# Patient Record
Sex: MALE | Race: BLACK OR AFRICAN AMERICAN | NOT HISPANIC OR LATINO | Employment: UNEMPLOYED | URBAN - NONMETROPOLITAN AREA
[De-identification: names, ages, dates, MRNs, and addresses within clinical notes are randomized per-mention and may not be internally consistent; named-entity substitution may affect disease eponyms.]

---

## 2020-08-01 ENCOUNTER — APPOINTMENT (OUTPATIENT)
Dept: NUCLEAR MEDICINE | Facility: HOSPITAL | Age: 45
End: 2020-08-01

## 2020-08-01 ENCOUNTER — APPOINTMENT (OUTPATIENT)
Dept: CARDIOLOGY | Facility: HOSPITAL | Age: 45
End: 2020-08-01

## 2020-08-01 ENCOUNTER — APPOINTMENT (OUTPATIENT)
Dept: GENERAL RADIOLOGY | Facility: HOSPITAL | Age: 45
End: 2020-08-01

## 2020-08-01 ENCOUNTER — HOSPITAL ENCOUNTER (INPATIENT)
Facility: HOSPITAL | Age: 45
LOS: 3 days | Discharge: HOME | End: 2020-08-04
Attending: INTERNAL MEDICINE | Admitting: INTERNAL MEDICINE

## 2020-08-01 ENCOUNTER — APPOINTMENT (OUTPATIENT)
Dept: ULTRASOUND IMAGING | Facility: HOSPITAL | Age: 45
End: 2020-08-01

## 2020-08-01 DIAGNOSIS — N18.30 CKD (CHRONIC KIDNEY DISEASE) STAGE 3, GFR 30-59 ML/MIN (HCC): Primary | ICD-10-CM

## 2020-08-01 PROBLEM — I21.4 NSTEMI (NON-ST ELEVATED MYOCARDIAL INFARCTION): Status: ACTIVE | Noted: 2020-08-01

## 2020-08-01 LAB
6-ACETYL MORPHINE: NEGATIVE
A-A DO2: 75.3 MMHG (ref 0–300)
ALBUMIN SERPL-MCNC: 3.4 G/DL (ref 3.5–5.2)
ALBUMIN/GLOB SERPL: 1.1 G/DL
ALP SERPL-CCNC: 72 U/L (ref 39–117)
ALT SERPL W P-5'-P-CCNC: 22 U/L (ref 1–41)
AMPHET+METHAMPHET UR QL: NEGATIVE
ANION GAP SERPL CALCULATED.3IONS-SCNC: 13.3 MMOL/L (ref 5–15)
APTT PPP: 31.6 SECONDS (ref 25.6–35.3)
ARTERIAL PATENCY WRIST A: POSITIVE
AST SERPL-CCNC: 21 U/L (ref 1–40)
ATMOSPHERIC PRESS: 723 MMHG
BARBITURATES UR QL SCN: NEGATIVE
BASE EXCESS BLDA CALC-SCNC: -0.3 MMOL/L (ref 0–2)
BASOPHILS # BLD AUTO: 0.02 10*3/MM3 (ref 0–0.2)
BASOPHILS NFR BLD AUTO: 0.3 % (ref 0–1.5)
BDY SITE: ABNORMAL
BENZODIAZ UR QL SCN: NEGATIVE
BILIRUB SERPL-MCNC: 0.2 MG/DL (ref 0–1.2)
BODY TEMPERATURE: 0 C
BUN SERPL-MCNC: 37 MG/DL (ref 6–20)
BUN/CREAT SERPL: 15.4 (ref 7–25)
BUPRENORPHINE SERPL-MCNC: NEGATIVE NG/ML
CA-I SERPL ISE-MCNC: 1.21 MMOL/L (ref 1.12–1.32)
CALCIUM SPEC-SCNC: 8.6 MG/DL (ref 8.6–10.5)
CANNABINOIDS SERPL QL: POSITIVE
CHLORIDE SERPL-SCNC: 103 MMOL/L (ref 98–107)
CHOLEST SERPL-MCNC: 153 MG/DL (ref 0–200)
CK MB SERPL-CCNC: 8.78 NG/ML
CK SERPL-CCNC: 315 U/L (ref 20–200)
CO2 BLDA-SCNC: 27 MMOL/L (ref 22–33)
CO2 SERPL-SCNC: 24.7 MMOL/L (ref 22–29)
COCAINE UR QL: NEGATIVE
COHGB MFR BLD: 1.7 % (ref 0–5)
CREAT SERPL-MCNC: 2.41 MG/DL (ref 0.76–1.27)
CRP SERPL-MCNC: 1.54 MG/DL (ref 0–0.5)
D DIMER PPP FEU-MCNC: 2.35 MCGFEU/ML (ref 0–0.5)
DEPRECATED RDW RBC AUTO: 46.5 FL (ref 37–54)
EOSINOPHIL # BLD AUTO: 0.16 10*3/MM3 (ref 0–0.4)
EOSINOPHIL NFR BLD AUTO: 2.8 % (ref 0.3–6.2)
ERYTHROCYTE [DISTWIDTH] IN BLOOD BY AUTOMATED COUNT: 15.1 % (ref 12.3–15.4)
GAS FLOW AIRWAY: 2 LPM
GFR SERPL CREATININE-BSD FRML MDRD: 35 ML/MIN/1.73
GLOBULIN UR ELPH-MCNC: 3.2 GM/DL
GLUCOSE SERPL-MCNC: 80 MG/DL (ref 65–99)
HBA1C MFR BLD: 5.6 % (ref 4.8–5.6)
HCO3 BLDA-SCNC: 25.6 MMOL/L (ref 20–26)
HCT VFR BLD AUTO: 41.3 % (ref 37.5–51)
HCT VFR BLD CALC: 39.7 % (ref 38–51)
HDLC SERPL-MCNC: 54 MG/DL (ref 40–60)
HGB BLD-MCNC: 12.7 G/DL (ref 13–17.7)
HGB BLDA-MCNC: 13 G/DL (ref 14–18)
IMM GRANULOCYTES # BLD AUTO: 0.01 10*3/MM3 (ref 0–0.05)
IMM GRANULOCYTES NFR BLD AUTO: 0.2 % (ref 0–0.5)
INHALED O2 CONCENTRATION: 28 %
INR PPP: 0.91 (ref 0.9–1.1)
LDLC SERPL CALC-MCNC: 86 MG/DL (ref 0–100)
LDLC/HDLC SERPL: 1.59 {RATIO}
LYMPHOCYTES # BLD AUTO: 1.7 10*3/MM3 (ref 0.7–3.1)
LYMPHOCYTES NFR BLD AUTO: 29.5 % (ref 19.6–45.3)
Lab: ABNORMAL
MAGNESIUM SERPL-MCNC: 2 MG/DL (ref 1.6–2.6)
MCH RBC QN AUTO: 26 PG (ref 26.6–33)
MCHC RBC AUTO-ENTMCNC: 30.8 G/DL (ref 31.5–35.7)
MCV RBC AUTO: 84.6 FL (ref 79–97)
METHADONE UR QL SCN: NEGATIVE
METHGB BLD QL: 0.4 % (ref 0–3)
MODALITY: ABNORMAL
MONOCYTES # BLD AUTO: 0.68 10*3/MM3 (ref 0.1–0.9)
MONOCYTES NFR BLD AUTO: 11.8 % (ref 5–12)
NEUTROPHILS NFR BLD AUTO: 3.2 10*3/MM3 (ref 1.7–7)
NEUTROPHILS NFR BLD AUTO: 55.4 % (ref 42.7–76)
NOTE: ABNORMAL
NRBC BLD AUTO-RTO: 0 /100 WBC (ref 0–0.2)
NT-PROBNP SERPL-MCNC: 4549 PG/ML (ref 0–450)
OPIATES UR QL: POSITIVE
OXYCODONE UR QL SCN: NEGATIVE
OXYHGB MFR BLDV: 89.7 % (ref 94–99)
PCO2 BLDA: 45.5 MM HG (ref 35–45)
PCO2 TEMP ADJ BLD: ABNORMAL MM[HG]
PCP UR QL SCN: NEGATIVE
PH BLDA: 7.36 PH UNITS (ref 7.35–7.45)
PH, TEMP CORRECTED: ABNORMAL
PLATELET # BLD AUTO: 190 10*3/MM3 (ref 140–450)
PMV BLD AUTO: 10.3 FL (ref 6–12)
PO2 BLDA: 63.3 MM HG (ref 83–108)
PO2 TEMP ADJ BLD: ABNORMAL MM[HG]
POTASSIUM SERPL-SCNC: 4 MMOL/L (ref 3.5–5.2)
PROT SERPL-MCNC: 6.6 G/DL (ref 6–8.5)
PROTHROMBIN TIME: 12.1 SECONDS (ref 11.9–14.1)
RBC # BLD AUTO: 4.88 10*6/MM3 (ref 4.14–5.8)
SAO2 % BLDCOA: 91.6 % (ref 94–99)
SODIUM SERPL-SCNC: 141 MMOL/L (ref 136–145)
TRIGL SERPL-MCNC: 65 MG/DL (ref 0–150)
TROPONIN T SERPL-MCNC: 0.05 NG/ML (ref 0–0.03)
TROPONIN T SERPL-MCNC: 0.06 NG/ML (ref 0–0.03)
TSH SERPL DL<=0.05 MIU/L-ACNC: 1.67 UIU/ML (ref 0.27–4.2)
VENTILATOR MODE: ABNORMAL
VLDLC SERPL-MCNC: 13 MG/DL
WBC # BLD AUTO: 5.77 10*3/MM3 (ref 3.4–10.8)

## 2020-08-01 PROCEDURE — 0 TECHNETIUM ALBUMIN AGGREGATED: Performed by: INTERNAL MEDICINE

## 2020-08-01 PROCEDURE — 80307 DRUG TEST PRSMV CHEM ANLYZR: CPT | Performed by: INTERNAL MEDICINE

## 2020-08-01 PROCEDURE — 94660 CPAP INITIATION&MGMT: CPT

## 2020-08-01 PROCEDURE — 84484 ASSAY OF TROPONIN QUANT: CPT | Performed by: INTERNAL MEDICINE

## 2020-08-01 PROCEDURE — 85379 FIBRIN DEGRADATION QUANT: CPT | Performed by: INTERNAL MEDICINE

## 2020-08-01 PROCEDURE — 93970 EXTREMITY STUDY: CPT

## 2020-08-01 PROCEDURE — 83880 ASSAY OF NATRIURETIC PEPTIDE: CPT | Performed by: INTERNAL MEDICINE

## 2020-08-01 PROCEDURE — 94799 UNLISTED PULMONARY SVC/PX: CPT

## 2020-08-01 PROCEDURE — 71045 X-RAY EXAM CHEST 1 VIEW: CPT

## 2020-08-01 PROCEDURE — 82805 BLOOD GASES W/O2 SATURATION: CPT

## 2020-08-01 PROCEDURE — 83735 ASSAY OF MAGNESIUM: CPT | Performed by: INTERNAL MEDICINE

## 2020-08-01 PROCEDURE — 82330 ASSAY OF CALCIUM: CPT | Performed by: INTERNAL MEDICINE

## 2020-08-01 PROCEDURE — A9540 TC99M MAA: HCPCS | Performed by: INTERNAL MEDICINE

## 2020-08-01 PROCEDURE — 25010000002 HEPARIN (PORCINE) PER 1000 UNITS: Performed by: INTERNAL MEDICINE

## 2020-08-01 PROCEDURE — 83036 HEMOGLOBIN GLYCOSYLATED A1C: CPT | Performed by: INTERNAL MEDICINE

## 2020-08-01 PROCEDURE — 82550 ASSAY OF CK (CPK): CPT | Performed by: INTERNAL MEDICINE

## 2020-08-01 PROCEDURE — 93306 TTE W/DOPPLER COMPLETE: CPT | Performed by: INTERNAL MEDICINE

## 2020-08-01 PROCEDURE — 93306 TTE W/DOPPLER COMPLETE: CPT

## 2020-08-01 PROCEDURE — 36600 WITHDRAWAL OF ARTERIAL BLOOD: CPT

## 2020-08-01 PROCEDURE — 85730 THROMBOPLASTIN TIME PARTIAL: CPT | Performed by: INTERNAL MEDICINE

## 2020-08-01 PROCEDURE — 80053 COMPREHEN METABOLIC PANEL: CPT | Performed by: INTERNAL MEDICINE

## 2020-08-01 PROCEDURE — 80061 LIPID PANEL: CPT | Performed by: INTERNAL MEDICINE

## 2020-08-01 PROCEDURE — 85610 PROTHROMBIN TIME: CPT | Performed by: INTERNAL MEDICINE

## 2020-08-01 PROCEDURE — 82375 ASSAY CARBOXYHB QUANT: CPT

## 2020-08-01 PROCEDURE — 82553 CREATINE MB FRACTION: CPT | Performed by: INTERNAL MEDICINE

## 2020-08-01 PROCEDURE — 93005 ELECTROCARDIOGRAM TRACING: CPT | Performed by: INTERNAL MEDICINE

## 2020-08-01 PROCEDURE — 83050 HGB METHEMOGLOBIN QUAN: CPT

## 2020-08-01 PROCEDURE — 71045 X-RAY EXAM CHEST 1 VIEW: CPT | Performed by: RADIOLOGY

## 2020-08-01 PROCEDURE — 99223 1ST HOSP IP/OBS HIGH 75: CPT | Performed by: INTERNAL MEDICINE

## 2020-08-01 PROCEDURE — 93010 ELECTROCARDIOGRAM REPORT: CPT | Performed by: INTERNAL MEDICINE

## 2020-08-01 PROCEDURE — 85025 COMPLETE CBC W/AUTO DIFF WBC: CPT | Performed by: INTERNAL MEDICINE

## 2020-08-01 PROCEDURE — 94640 AIRWAY INHALATION TREATMENT: CPT

## 2020-08-01 PROCEDURE — 84443 ASSAY THYROID STIM HORMONE: CPT | Performed by: INTERNAL MEDICINE

## 2020-08-01 PROCEDURE — 78580 LUNG PERFUSION IMAGING: CPT

## 2020-08-01 PROCEDURE — 86140 C-REACTIVE PROTEIN: CPT | Performed by: INTERNAL MEDICINE

## 2020-08-01 RX ORDER — ISOSORBIDE MONONITRATE 120 MG/1
120 TABLET, EXTENDED RELEASE ORAL DAILY
COMMUNITY

## 2020-08-01 RX ORDER — ISOSORBIDE MONONITRATE 60 MG/1
120 TABLET, EXTENDED RELEASE ORAL DAILY
Status: CANCELLED | OUTPATIENT
Start: 2020-08-01

## 2020-08-01 RX ORDER — BISACODYL 5 MG/1
5 TABLET, DELAYED RELEASE ORAL DAILY PRN
Status: DISCONTINUED | OUTPATIENT
Start: 2020-08-01 | End: 2020-08-04 | Stop reason: HOSPADM

## 2020-08-01 RX ORDER — HEPARIN SODIUM 5000 [USP'U]/ML
5000 INJECTION, SOLUTION INTRAVENOUS; SUBCUTANEOUS AS NEEDED
Status: DISCONTINUED | OUTPATIENT
Start: 2020-08-01 | End: 2020-08-02

## 2020-08-01 RX ORDER — CLONIDINE HYDROCHLORIDE 0.2 MG/1
0.2 TABLET ORAL 3 TIMES DAILY
Status: CANCELLED | OUTPATIENT
Start: 2020-08-01

## 2020-08-01 RX ORDER — MONTELUKAST SODIUM 10 MG/1
10 TABLET ORAL NIGHTLY
Status: CANCELLED | OUTPATIENT
Start: 2020-08-01

## 2020-08-01 RX ORDER — ALBUTEROL SULFATE 2.5 MG/3ML
2.5 SOLUTION RESPIRATORY (INHALATION) EVERY 4 HOURS PRN
Status: ON HOLD | COMMUNITY
End: 2021-12-17

## 2020-08-01 RX ORDER — HYDRALAZINE HYDROCHLORIDE 50 MG/1
50 TABLET, FILM COATED ORAL 3 TIMES DAILY
Status: DISCONTINUED | OUTPATIENT
Start: 2020-08-01 | End: 2020-08-03

## 2020-08-01 RX ORDER — IPRATROPIUM BROMIDE AND ALBUTEROL SULFATE 2.5; .5 MG/3ML; MG/3ML
3 SOLUTION RESPIRATORY (INHALATION)
Status: DISCONTINUED | OUTPATIENT
Start: 2020-08-01 | End: 2020-08-01

## 2020-08-01 RX ORDER — ALPRAZOLAM 1 MG/1
1 TABLET ORAL 2 TIMES DAILY PRN
Status: CANCELLED | OUTPATIENT
Start: 2020-08-01

## 2020-08-01 RX ORDER — ALBUTEROL SULFATE 90 UG/1
2 AEROSOL, METERED RESPIRATORY (INHALATION) EVERY 4 HOURS PRN
Status: ON HOLD | COMMUNITY
End: 2021-12-17

## 2020-08-01 RX ORDER — NITROGLYCERIN 20 MG/100ML
5-200 INJECTION INTRAVENOUS
Status: DISCONTINUED | OUTPATIENT
Start: 2020-08-01 | End: 2020-08-03

## 2020-08-01 RX ORDER — CLONIDINE HYDROCHLORIDE 0.2 MG/1
0.2 TABLET ORAL EVERY 8 HOURS SCHEDULED
Status: CANCELLED | OUTPATIENT
Start: 2020-08-01

## 2020-08-01 RX ORDER — ALPRAZOLAM 1 MG/1
1 TABLET ORAL NIGHTLY
COMMUNITY

## 2020-08-01 RX ORDER — CLONIDINE HYDROCHLORIDE 0.2 MG/1
0.2 TABLET ORAL 3 TIMES DAILY
Status: DISCONTINUED | OUTPATIENT
Start: 2020-08-01 | End: 2020-08-04 | Stop reason: HOSPADM

## 2020-08-01 RX ORDER — ALUMINA, MAGNESIA, AND SIMETHICONE 2400; 2400; 240 MG/30ML; MG/30ML; MG/30ML
15 SUSPENSION ORAL EVERY 6 HOURS PRN
Status: DISCONTINUED | OUTPATIENT
Start: 2020-08-01 | End: 2020-08-04 | Stop reason: HOSPADM

## 2020-08-01 RX ORDER — SODIUM CHLORIDE 0.9 % (FLUSH) 0.9 %
10 SYRINGE (ML) INJECTION AS NEEDED
Status: DISCONTINUED | OUTPATIENT
Start: 2020-08-01 | End: 2020-08-04 | Stop reason: HOSPADM

## 2020-08-01 RX ORDER — LOSARTAN POTASSIUM 25 MG/1
25 TABLET ORAL DAILY
Status: CANCELLED | OUTPATIENT
Start: 2020-08-01

## 2020-08-01 RX ORDER — ASPIRIN 81 MG/1
81 TABLET ORAL DAILY
Status: CANCELLED | OUTPATIENT
Start: 2020-08-01

## 2020-08-01 RX ORDER — ALPRAZOLAM 1 MG/1
1 TABLET ORAL 2 TIMES DAILY PRN
Status: DISCONTINUED | OUTPATIENT
Start: 2020-08-01 | End: 2020-08-02

## 2020-08-01 RX ORDER — ONDANSETRON 4 MG/1
4 TABLET, FILM COATED ORAL EVERY 6 HOURS PRN
Status: DISCONTINUED | OUTPATIENT
Start: 2020-08-01 | End: 2020-08-04 | Stop reason: HOSPADM

## 2020-08-01 RX ORDER — TAMSULOSIN HYDROCHLORIDE 0.4 MG/1
0.4 CAPSULE ORAL NIGHTLY
Status: CANCELLED | OUTPATIENT
Start: 2020-08-01

## 2020-08-01 RX ORDER — BUPROPION HYDROCHLORIDE 100 MG/1
200 TABLET, EXTENDED RELEASE ORAL DAILY
Status: CANCELLED | OUTPATIENT
Start: 2020-08-01

## 2020-08-01 RX ORDER — ASPIRIN 81 MG/1
81 TABLET ORAL DAILY
Status: ON HOLD | COMMUNITY
End: 2021-12-17

## 2020-08-01 RX ORDER — ONDANSETRON 2 MG/ML
4 INJECTION INTRAMUSCULAR; INTRAVENOUS EVERY 6 HOURS PRN
Status: DISCONTINUED | OUTPATIENT
Start: 2020-08-01 | End: 2020-08-04 | Stop reason: HOSPADM

## 2020-08-01 RX ORDER — BISACODYL 10 MG
10 SUPPOSITORY, RECTAL RECTAL DAILY PRN
Status: DISCONTINUED | OUTPATIENT
Start: 2020-08-01 | End: 2020-08-04 | Stop reason: HOSPADM

## 2020-08-01 RX ORDER — SODIUM CHLORIDE 0.9 % (FLUSH) 0.9 %
10 SYRINGE (ML) INJECTION EVERY 12 HOURS SCHEDULED
Status: DISCONTINUED | OUTPATIENT
Start: 2020-08-01 | End: 2020-08-04 | Stop reason: HOSPADM

## 2020-08-01 RX ORDER — BUPROPION HYDROCHLORIDE 100 MG/1
200 TABLET, EXTENDED RELEASE ORAL DAILY
Status: ON HOLD | COMMUNITY
End: 2021-12-17

## 2020-08-01 RX ORDER — ASPIRIN 81 MG/1
81 TABLET ORAL DAILY
Status: DISCONTINUED | OUTPATIENT
Start: 2020-08-01 | End: 2020-08-04 | Stop reason: HOSPADM

## 2020-08-01 RX ORDER — ATORVASTATIN CALCIUM 40 MG/1
40 TABLET, FILM COATED ORAL NIGHTLY
Status: DISCONTINUED | OUTPATIENT
Start: 2020-08-01 | End: 2020-08-04 | Stop reason: HOSPADM

## 2020-08-01 RX ORDER — ATORVASTATIN CALCIUM 40 MG/1
40 TABLET, FILM COATED ORAL NIGHTLY
COMMUNITY

## 2020-08-01 RX ORDER — HEPARIN SODIUM 10000 [USP'U]/100ML
5.99 INJECTION, SOLUTION INTRAVENOUS
Status: DISCONTINUED | OUTPATIENT
Start: 2020-08-01 | End: 2020-08-02

## 2020-08-01 RX ORDER — ALBUTEROL SULFATE 2.5 MG/3ML
2.5 SOLUTION RESPIRATORY (INHALATION) EVERY 4 HOURS PRN
Status: CANCELLED | OUTPATIENT
Start: 2020-08-01

## 2020-08-01 RX ORDER — LOSARTAN POTASSIUM 25 MG/1
25 TABLET ORAL DAILY
COMMUNITY
End: 2020-08-04 | Stop reason: HOSPADM

## 2020-08-01 RX ORDER — HYDRALAZINE HYDROCHLORIDE 50 MG/1
50 TABLET, FILM COATED ORAL EVERY 8 HOURS SCHEDULED
Status: CANCELLED | OUTPATIENT
Start: 2020-08-01

## 2020-08-01 RX ORDER — LABETALOL 300 MG/1
300 TABLET, FILM COATED ORAL 3 TIMES DAILY
COMMUNITY

## 2020-08-01 RX ORDER — BUMETANIDE 1 MG/1
1 TABLET ORAL DAILY
Status: CANCELLED | OUTPATIENT
Start: 2020-08-01

## 2020-08-01 RX ORDER — IPRATROPIUM BROMIDE AND ALBUTEROL SULFATE 2.5; .5 MG/3ML; MG/3ML
3 SOLUTION RESPIRATORY (INHALATION)
Status: DISCONTINUED | OUTPATIENT
Start: 2020-08-01 | End: 2020-08-04 | Stop reason: HOSPADM

## 2020-08-01 RX ORDER — ATORVASTATIN CALCIUM 40 MG/1
40 TABLET, FILM COATED ORAL NIGHTLY
Status: CANCELLED | OUTPATIENT
Start: 2020-08-01

## 2020-08-01 RX ORDER — TAMSULOSIN HYDROCHLORIDE 0.4 MG/1
0.4 CAPSULE ORAL NIGHTLY
Status: DISCONTINUED | OUTPATIENT
Start: 2020-08-01 | End: 2020-08-04 | Stop reason: HOSPADM

## 2020-08-01 RX ORDER — HEPARIN SODIUM 5000 [USP'U]/ML
2500 INJECTION, SOLUTION INTRAVENOUS; SUBCUTANEOUS AS NEEDED
Status: DISCONTINUED | OUTPATIENT
Start: 2020-08-01 | End: 2020-08-02

## 2020-08-01 RX ORDER — MONTELUKAST SODIUM 10 MG/1
10 TABLET ORAL NIGHTLY
Status: DISCONTINUED | OUTPATIENT
Start: 2020-08-01 | End: 2020-08-04 | Stop reason: HOSPADM

## 2020-08-01 RX ORDER — ACETAMINOPHEN 325 MG/1
650 TABLET ORAL EVERY 4 HOURS PRN
Status: DISCONTINUED | OUTPATIENT
Start: 2020-08-01 | End: 2020-08-04 | Stop reason: HOSPADM

## 2020-08-01 RX ORDER — DOCUSATE SODIUM 100 MG/1
100 CAPSULE, LIQUID FILLED ORAL 2 TIMES DAILY PRN
Status: DISCONTINUED | OUTPATIENT
Start: 2020-08-01 | End: 2020-08-04 | Stop reason: HOSPADM

## 2020-08-01 RX ORDER — BUMETANIDE 1 MG/1
1 TABLET ORAL DAILY
Status: ON HOLD | COMMUNITY
End: 2020-08-04 | Stop reason: SDUPTHER

## 2020-08-01 RX ORDER — CLONIDINE HYDROCHLORIDE 0.2 MG/1
0.3 TABLET ORAL 3 TIMES DAILY
Status: ON HOLD | COMMUNITY
End: 2021-12-17

## 2020-08-01 RX ORDER — MONTELUKAST SODIUM 10 MG/1
10 TABLET ORAL NIGHTLY
COMMUNITY

## 2020-08-01 RX ORDER — HEPARIN SODIUM 5000 [USP'U]/ML
5000 INJECTION, SOLUTION INTRAVENOUS; SUBCUTANEOUS ONCE
Status: COMPLETED | OUTPATIENT
Start: 2020-08-01 | End: 2020-08-01

## 2020-08-01 RX ORDER — HYDRALAZINE HYDROCHLORIDE 50 MG/1
50 TABLET, FILM COATED ORAL 3 TIMES DAILY
Status: ON HOLD | COMMUNITY
End: 2020-08-04 | Stop reason: SDUPTHER

## 2020-08-01 RX ORDER — NITROGLYCERIN 20 MG/100ML
INJECTION INTRAVENOUS
Status: COMPLETED
Start: 2020-08-01 | End: 2020-08-01

## 2020-08-01 RX ORDER — ACETAMINOPHEN 160 MG/5ML
650 SOLUTION ORAL EVERY 4 HOURS PRN
Status: DISCONTINUED | OUTPATIENT
Start: 2020-08-01 | End: 2020-08-04 | Stop reason: HOSPADM

## 2020-08-01 RX ORDER — ACETAMINOPHEN 650 MG/1
650 SUPPOSITORY RECTAL EVERY 4 HOURS PRN
Status: DISCONTINUED | OUTPATIENT
Start: 2020-08-01 | End: 2020-08-04 | Stop reason: HOSPADM

## 2020-08-01 RX ORDER — TAMSULOSIN HYDROCHLORIDE 0.4 MG/1
1 CAPSULE ORAL DAILY
COMMUNITY

## 2020-08-01 RX ORDER — BUPROPION HYDROCHLORIDE 100 MG/1
200 TABLET, EXTENDED RELEASE ORAL DAILY
Status: DISCONTINUED | OUTPATIENT
Start: 2020-08-01 | End: 2020-08-04 | Stop reason: HOSPADM

## 2020-08-01 RX ORDER — ASPIRIN 81 MG/1
TABLET, CHEWABLE ORAL
Status: DISPENSED
Start: 2020-08-01 | End: 2020-08-02

## 2020-08-01 RX ORDER — HYDRALAZINE HYDROCHLORIDE 50 MG/1
50 TABLET, FILM COATED ORAL 3 TIMES DAILY
Status: CANCELLED | OUTPATIENT
Start: 2020-08-01

## 2020-08-01 RX ORDER — ISOSORBIDE MONONITRATE 60 MG/1
120 TABLET, EXTENDED RELEASE ORAL DAILY
Status: DISCONTINUED | OUTPATIENT
Start: 2020-08-01 | End: 2020-08-04 | Stop reason: HOSPADM

## 2020-08-01 RX ADMIN — IPRATROPIUM BROMIDE AND ALBUTEROL SULFATE 3 ML: .5; 3 SOLUTION RESPIRATORY (INHALATION) at 18:51

## 2020-08-01 RX ADMIN — ASPIRIN 81 MG: 81 TABLET, COATED ORAL at 16:16

## 2020-08-01 RX ADMIN — Medication 1 DOSE: at 17:19

## 2020-08-01 RX ADMIN — TAMSULOSIN HYDROCHLORIDE 0.4 MG: 0.4 CAPSULE ORAL at 21:13

## 2020-08-01 RX ADMIN — SODIUM CHLORIDE 5 MG/HR: 9 INJECTION, SOLUTION INTRAVENOUS at 16:14

## 2020-08-01 RX ADMIN — NITROGLYCERIN 50 MCG/KG/MIN: 20 INJECTION INTRAVENOUS at 14:54

## 2020-08-01 RX ADMIN — HYDRALAZINE HYDROCHLORIDE 50 MG: 50 TABLET, FILM COATED ORAL at 21:13

## 2020-08-01 RX ADMIN — HEPARIN SODIUM 5000 UNITS: 5000 INJECTION INTRAVENOUS; SUBCUTANEOUS at 18:28

## 2020-08-01 RX ADMIN — SODIUM CHLORIDE 10 MG/HR: 9 INJECTION, SOLUTION INTRAVENOUS at 19:25

## 2020-08-01 RX ADMIN — CLONIDINE HYDROCHLORIDE 0.2 MG: 0.2 TABLET ORAL at 21:13

## 2020-08-01 RX ADMIN — ISOSORBIDE MONONITRATE 120 MG: 60 TABLET ORAL at 21:53

## 2020-08-01 RX ADMIN — ATORVASTATIN CALCIUM 40 MG: 40 TABLET, FILM COATED ORAL at 21:13

## 2020-08-01 RX ADMIN — SODIUM CHLORIDE 15 MG/HR: 9 INJECTION, SOLUTION INTRAVENOUS at 18:29

## 2020-08-01 RX ADMIN — SODIUM CHLORIDE, PRESERVATIVE FREE 10 ML: 5 INJECTION INTRAVENOUS at 21:14

## 2020-08-01 RX ADMIN — LABETALOL HYDROCHLORIDE 300 MG: 100 TABLET, FILM COATED ORAL at 21:13

## 2020-08-01 RX ADMIN — BUPROPION HYDROCHLORIDE 200 MG: 100 TABLET, EXTENDED RELEASE ORAL at 21:54

## 2020-08-01 RX ADMIN — SODIUM CHLORIDE 10 MG/HR: 9 INJECTION, SOLUTION INTRAVENOUS at 21:54

## 2020-08-01 RX ADMIN — HEPARIN SODIUM 5.99 UNITS/KG/HR: 10000 INJECTION, SOLUTION INTRAVENOUS at 18:28

## 2020-08-01 RX ADMIN — MONTELUKAST SODIUM 10 MG: 10 TABLET, COATED ORAL at 21:13

## 2020-08-01 NOTE — PLAN OF CARE
Pt arrival to unit c/o dull chest pain and hx of angina with exertion. No acute distress noted at this time. Attempting to access port a cath. WCTM

## 2020-08-01 NOTE — H&P
[facility] HOSPITALIST HISTORY AND PHYSICAL    Patient Identification:  Name:  Sudarshan Keene  Age:  45 y.o.  Sex:  male  :  1975  MRN:  [id number]   Visit Number:  [id number]  Primary Care Physician:  Provider, No Known         Chief complaint: Chest pain    History of presenting illness:    Sudarshan Keene is a 45 y.o. male with PMH significant for nonobstructive coronary artery disease (patient had a heart cath in  but no stents were placed), CHF, COPD, uncontrolled hypertension, sleep apnea and arthritis presented to the outside hospital emergency department chest pain.  Patient was found to have systolic blood pressure greater than 200.  He was also complaining of chest pain and troponins were found to be elevated and patient was transferred to our facility for further evaluation management.    Patient states that he woke up this morning because of his panic attack.  Patient states that he has had panic attacks off and on.  Patient states that he started having chest pain thereafter.  Patient states that the pain was localized in right lower chest around midclavicular line in 5th-6th intercostal space.  Patient states the pain was like a muscle spasm that he thought would go away but pain persisted.  Patient said the pain lasted for about 25 minutes.  He states that he took 3 nitro glycerin but did not have any improvement in his chest pain.  Patient therefore presented to the emergency department.  Patient states that the pain was associated with nausea and vomiting.  Patient also states that he has had some dizziness and lightheadedness and shortness of breath.  Patient states that he has had similar chest pain in the past as well.  He states that he had similar chest pain when he underwent cardiac catheterization in .  Per report from ER physician at [place] where patient initially presented, patient has had multiple presentations to the ER with chest pain and was advised  admission but had left AGAINST MEDICAL ADVICE because he said that he is feeling better.  Patient denies history of smoking but states that he uses marijuana daily and last use was 12 hours ago.  He denies any alcohol use.       Review of systems:  Constitutional: Patient denies any fevers, chills, lethargy, weight loss or night sweats.  Vision and hearing: Patient denies any acute changes in vision and hearing.  Respiratory: Patient denies any cough, hemoptysis, dyspnea.  Cardiovascular: Patient is chest pain-free at this time but had chest pain earlier as per HPI.  GI: Patient denies any nausea, vomiting, hematemesis, melena and hematochezia  Genitourinary: Patient denies any dysuria, frequency, urgency or hematuria.  Neurologic: Patient denies any dizziness, lightheadedness, seizures, syncope.  Psychiatry: Patient denies any delusions, hallucinations, suicidal ideation.  Musculoskeletal: Patient denies any gait problems, joint swelling, joint pains or back pain.      Past Medical History:   Diagnosis Date   • Arthritis    • Asthma    • CHF (congestive heart failure) (CMS/[facility])    • COPD (chronic obstructive pulmonary disease) (CMS/[facility])    • Coronary artery disease    • Hypertension    • Sleep apnea      Past Surgical History:   Procedure Laterality Date   • CARDIAC CATHETERIZATION  2008   • HERNIA REPAIR       Family History   Problem Relation Age of Onset   • Hypertension Mother    • Hypertension Father    • Hypertension Sister    • Heart disease Sister      Social History     Socioeconomic History   • Marital status: Single     Spouse name: Not on file   • Number of children: Not on file   • Years of education: Not on file   • Highest education level: Not on file   Tobacco Use   • Smoking status: Former Smoker     Packs/day: 0.25     Types: Cigarettes     Last attempt to quit: 2014     Years since quittin.0   • Smokeless tobacco: Never Used   Substance and Sexual Activity   • Alcohol use: Never      Frequency: Never   • Drug use: Yes     Frequency: 7.0 times per week     Types: Marijuana     Comment: tobacco free   • Sexual activity: Yes     Partners: Female     ---------------------------------------------------------------------------------------------------------------------   Allergies:  Patient has no known allergies.  ---------------------------------------------------------------------------------------------------------------------   Prior to Admission Medications     None        Hospital Scheduled Meds:    aspirin 81 mg Oral Daily   sodium chloride 10 mL Intravenous Q12H       nitroglycerin 5-200 mcg/min   Pharmacy to Dose enoxaparin (LOVENOX)      ---------------------------------------------------------------------------------------------------------------------   Vital Signs:  Temp:  [98.6 °F (37 °C)] 98.6 °F (37 °C)  Heart Rate:  [84-86] 86  Resp:  [18] 18  BP: (182-218)/(116-134) 218/134      08/01/20  1434   Weight: (!) 167 kg (368 lb 8 oz)     Body mass index is 54.42 kg/m².  ---------------------------------------------------------------------------------------------------------------------   Physical Exam:  Constitutional: Morbidly obese male, lying in bed, no acute distress.   HENT:  Head:  Normocephalic and atraumatic.  Mouth:  Moist mucous membranes.    Eyes:  Pupils are equal, round, and reactive to light.   Neck:  Neck supple.  No JVD present.    Cardiovascular:  Regular rate and rhythm. S1+S2. No murmur, rubs or gallops.   Lungs/Chest: Clear to auscultation bilaterally.  Patient has a port in right upper chest.  Abdomen:  Soft. Nontender. No viscera palpable.  Bowel sounds audible.   Musculoskeletal: No deformity or joint swelling.   Peripheral vascular: Bilateral dorsalis pedis palpable.  Bilateral lower extremity minimal pitting edema.  Neurological:  Alert and oriented to person, place, and time.  Cranial nerves grossly intact. Strength bilaterally symmetrical in upper and lower  extremities.   Skin:  Skin is warm and dry. No rash noted. No pallor.  ---------------------------------------------------------------------------------------------------------------------  EKG: Normal sinus rhythm.  Poor R wave progression anterior leads.    ---------------------------------------------------------------------------------------------------------------------                 Invalid input(s): PROTCrCl cannot be calculated (Patient's most recent lab result is older than the maximum 30 days allowed.).  No results found for: AMMONIA          Lab Results   Component Value Date    HGBA1C 5.0 12/21/2015     Lab Results   Component Value Date    TSH 1.061 12/21/2015    FREET4 1.05 12/21/2015     No results found for: PREGTESTUR, PREGSERUM, HCG, HCGQUANT  Pain Management Panel     Pain Management Panel Latest Ref Rng & Units 12/21/2015    AMPHETAMINES SCREEN, URINE Negative Negative    BARBITURATES SCREEN Negative Negative    BENZODIAZEPINE SCREEN, URINE Negative Negative    COCAINE SCREEN, URINE Negative Negative    METHADONE SCREEN, URINE Negative Negative        No results found for: BLOODCX  No results found for: URINECX  No results found for: WOUNDCX  No results found for: STOOLCX      ---------------------------------------------------------------------------------------------------------------------  Imaging Results (Last 7 Days)     ** No results found for the last 168 hours. **          I have personally reviewed the radiology images and read the final radiology report.  ---------------------------------------------------------------------------------------------------------------------  Assessment and Plan:    -Hypertensive urgency/emergency  Patient's blood pressure was 190 systolic when he presented here.  At the outside facility his blood pressure has been 240.  Patient states that he has history of hypertension and his blood pressure runs high.  He denies missing any of his medications.  I will  continue to monitor glycerin drip and once his home meds have been reconciled, I would resume his home meds.  Aim to keep systolic blood pressure less than 180.  Will consider addition of Cardene drip if needed.    -Chest pain/NSTEMI  Per report from physical at [place], patient had elevated troponins.  Patient has had previously elevated troponins as well.  EKG showed no acute ST-T wave changes.  Patient has atypical features with his chest pain.  I will start patient on aspirin and continue nitroglycerin for his chest pain.  Will add beta-blocker and statin.  Cardiology is consulted.  Will obtain echocardiogram and check serial cardiac enzymes.    -CHF  Patient reports history of CHF and currently has minimal pitting edema lower extremities.  I will check a BNP level and echocardiogram to assess left ventricular function.  Patient does not appear in fluid overload at this time.  Will check a chest x-ray and further plan will based on the results of above investigation.  Continue to monitor his intake and output.    -COPD  Patient denies tobacco use but states that he usually smokes marijuana.  Currently does not appear to be in exacerbation.  I will keep patient on duo nebs.    -History of panic attacks  Patient states that he has had panic attacks for a while.  I will refill his medications when reconciled by pharmacy.  Patient may benefit from psych consultation.  Given his history of marijuana use, I will avoid any benzodiazepines at this time.    Activity: Bedrest  Nutrition: Cardiac diet, low-sodium  Fluids: None  DVT prophylaxis: Lovenox SQ  GI prophylaxis: Pepcid    I discussed the plan of care with patient and nursing staff.  Questions answered.  Patient is at high risk for decompensation due to hypertensive urgency, NSTEMI, CHF, COPD.    Jake Gustafson MD  08/01/20  15:01

## 2020-08-02 ENCOUNTER — APPOINTMENT (OUTPATIENT)
Dept: GENERAL RADIOLOGY | Facility: HOSPITAL | Age: 45
End: 2020-08-02

## 2020-08-02 LAB
ALBUMIN SERPL-MCNC: 3.54 G/DL (ref 3.5–5.2)
ALBUMIN/GLOB SERPL: 1 G/DL
ALP SERPL-CCNC: 76 U/L (ref 39–117)
ALT SERPL W P-5'-P-CCNC: 24 U/L (ref 1–41)
ANION GAP SERPL CALCULATED.3IONS-SCNC: 22.1 MMOL/L (ref 5–15)
APTT PPP: 39 SECONDS (ref 25.6–35.3)
APTT PPP: 71.8 SECONDS (ref 25.6–35.3)
APTT PPP: 83.2 SECONDS (ref 25.6–35.3)
AST SERPL-CCNC: 23 U/L (ref 1–40)
BASOPHILS # BLD AUTO: 0.01 10*3/MM3 (ref 0–0.2)
BASOPHILS NFR BLD AUTO: 0.2 % (ref 0–1.5)
BH CV ECHO MEAS - % IVS THICK: 4.7 %
BH CV ECHO MEAS - % LVPW THICK: -5.1 %
BH CV ECHO MEAS - ACS: 1.4 CM
BH CV ECHO MEAS - AO MAX PG: 6.9 MMHG
BH CV ECHO MEAS - AO MEAN PG: 4 MMHG
BH CV ECHO MEAS - AO ROOT AREA (BSA CORRECTED): 1.1
BH CV ECHO MEAS - AO ROOT AREA: 7.1 CM^2
BH CV ECHO MEAS - AO ROOT DIAM: 3 CM
BH CV ECHO MEAS - AO V2 MAX: 131 CM/SEC
BH CV ECHO MEAS - AO V2 MEAN: 88.2 CM/SEC
BH CV ECHO MEAS - AO V2 VTI: 23.5 CM
BH CV ECHO MEAS - BSA(HAYCOCK): 2.9 M^2
BH CV ECHO MEAS - BSA: 2.7 M^2
BH CV ECHO MEAS - BZI_BMI: 54.3 KILOGRAMS/M^2
BH CV ECHO MEAS - BZI_METRIC_HEIGHT: 175.3 CM
BH CV ECHO MEAS - BZI_METRIC_WEIGHT: 166.9 KG
BH CV ECHO MEAS - EDV(CUBED): 101.5 ML
BH CV ECHO MEAS - EDV(MOD-SP4): 128 ML
BH CV ECHO MEAS - EDV(TEICH): 100.6 ML
BH CV ECHO MEAS - EF(CUBED): 57.6 %
BH CV ECHO MEAS - EF(MOD-SP4): 68.7 %
BH CV ECHO MEAS - EF(TEICH): 49.3 %
BH CV ECHO MEAS - ESV(CUBED): 43.1 ML
BH CV ECHO MEAS - ESV(MOD-SP4): 40.1 ML
BH CV ECHO MEAS - ESV(TEICH): 51 ML
BH CV ECHO MEAS - FS: 24.9 %
BH CV ECHO MEAS - IVS/LVPW: 0.77
BH CV ECHO MEAS - IVSD: 2.7 CM
BH CV ECHO MEAS - IVSS: 2.8 CM
BH CV ECHO MEAS - LA DIMENSION: 5.2 CM
BH CV ECHO MEAS - LA/AO: 1.7
BH CV ECHO MEAS - LV DIASTOLIC VOL/BSA (35-75): 47.8 ML/M^2
BH CV ECHO MEAS - LV MASS(C)D: 978.8 GRAMS
BH CV ECHO MEAS - LV MASS(C)DI: 365.7 GRAMS/M^2
BH CV ECHO MEAS - LV MASS(C)S: 709 GRAMS
BH CV ECHO MEAS - LV MASS(C)SI: 264.9 GRAMS/M^2
BH CV ECHO MEAS - LV SYSTOLIC VOL/BSA (12-30): 15 ML/M^2
BH CV ECHO MEAS - LVIDD: 4.7 CM
BH CV ECHO MEAS - LVIDS: 3.5 CM
BH CV ECHO MEAS - LVLD AP4: 9 CM
BH CV ECHO MEAS - LVLS AP4: 8.6 CM
BH CV ECHO MEAS - LVOT AREA (M): 3.1 CM^2
BH CV ECHO MEAS - LVOT AREA: 3.1 CM^2
BH CV ECHO MEAS - LVOT DIAM: 2 CM
BH CV ECHO MEAS - LVPWD: 3.5 CM
BH CV ECHO MEAS - LVPWS: 3.3 CM
BH CV ECHO MEAS - MV A MAX VEL: 85.7 CM/SEC
BH CV ECHO MEAS - MV E MAX VEL: 120 CM/SEC
BH CV ECHO MEAS - MV E/A: 1.4
BH CV ECHO MEAS - PA ACC TIME: 0.13 SEC
BH CV ECHO MEAS - PA PR(ACCEL): 21.9 MMHG
BH CV ECHO MEAS - RAP SYSTOLE: 10 MMHG
BH CV ECHO MEAS - RVSP: 36.9 MMHG
BH CV ECHO MEAS - SI(AO): 62.1 ML/M^2
BH CV ECHO MEAS - SI(CUBED): 21.8 ML/M^2
BH CV ECHO MEAS - SI(MOD-SP4): 32.8 ML/M^2
BH CV ECHO MEAS - SI(TEICH): 18.5 ML/M^2
BH CV ECHO MEAS - SV(AO): 166.1 ML
BH CV ECHO MEAS - SV(CUBED): 58.5 ML
BH CV ECHO MEAS - SV(MOD-SP4): 87.9 ML
BH CV ECHO MEAS - SV(TEICH): 49.5 ML
BH CV ECHO MEAS - TR MAX VEL: 258 CM/SEC
BILIRUB SERPL-MCNC: 0.2 MG/DL (ref 0–1.2)
BUN SERPL-MCNC: 35 MG/DL (ref 6–20)
BUN/CREAT SERPL: 14.8 (ref 7–25)
CALCIUM SPEC-SCNC: 8.8 MG/DL (ref 8.6–10.5)
CHLORIDE SERPL-SCNC: 105 MMOL/L (ref 98–107)
CO2 SERPL-SCNC: 15.9 MMOL/L (ref 22–29)
CREAT SERPL-MCNC: 2.36 MG/DL (ref 0.76–1.27)
DEPRECATED RDW RBC AUTO: 44.6 FL (ref 37–54)
EOSINOPHIL # BLD AUTO: 0.15 10*3/MM3 (ref 0–0.4)
EOSINOPHIL NFR BLD AUTO: 2.8 % (ref 0.3–6.2)
ERYTHROCYTE [DISTWIDTH] IN BLOOD BY AUTOMATED COUNT: 14.8 % (ref 12.3–15.4)
GFR SERPL CREATININE-BSD FRML MDRD: 36 ML/MIN/1.73
GLOBULIN UR ELPH-MCNC: 3.5 GM/DL
GLUCOSE SERPL-MCNC: 103 MG/DL (ref 65–99)
HCT VFR BLD AUTO: 38.2 % (ref 37.5–51)
HGB BLD-MCNC: 11.7 G/DL (ref 13–17.7)
IMM GRANULOCYTES # BLD AUTO: 0.02 10*3/MM3 (ref 0–0.05)
IMM GRANULOCYTES NFR BLD AUTO: 0.4 % (ref 0–0.5)
LYMPHOCYTES # BLD AUTO: 1.31 10*3/MM3 (ref 0.7–3.1)
LYMPHOCYTES NFR BLD AUTO: 24 % (ref 19.6–45.3)
MCH RBC QN AUTO: 25.4 PG (ref 26.6–33)
MCHC RBC AUTO-ENTMCNC: 30.6 G/DL (ref 31.5–35.7)
MCV RBC AUTO: 83 FL (ref 79–97)
MONOCYTES # BLD AUTO: 0.58 10*3/MM3 (ref 0.1–0.9)
MONOCYTES NFR BLD AUTO: 10.6 % (ref 5–12)
NEUTROPHILS NFR BLD AUTO: 3.38 10*3/MM3 (ref 1.7–7)
NEUTROPHILS NFR BLD AUTO: 62 % (ref 42.7–76)
NRBC BLD AUTO-RTO: 0 /100 WBC (ref 0–0.2)
PLATELET # BLD AUTO: 183 10*3/MM3 (ref 140–450)
PMV BLD AUTO: 9.8 FL (ref 6–12)
POTASSIUM SERPL-SCNC: 3.8 MMOL/L (ref 3.5–5.2)
PROT SERPL-MCNC: 7 G/DL (ref 6–8.5)
RBC # BLD AUTO: 4.6 10*6/MM3 (ref 4.14–5.8)
SODIUM SERPL-SCNC: 143 MMOL/L (ref 136–145)
TROPONIN T SERPL-MCNC: 0.05 NG/ML (ref 0–0.03)
WBC # BLD AUTO: 5.45 10*3/MM3 (ref 3.4–10.8)

## 2020-08-02 PROCEDURE — 71045 X-RAY EXAM CHEST 1 VIEW: CPT | Performed by: RADIOLOGY

## 2020-08-02 PROCEDURE — 85025 COMPLETE CBC W/AUTO DIFF WBC: CPT | Performed by: INTERNAL MEDICINE

## 2020-08-02 PROCEDURE — 84484 ASSAY OF TROPONIN QUANT: CPT | Performed by: INTERNAL MEDICINE

## 2020-08-02 PROCEDURE — 36556 INSERT NON-TUNNEL CV CATH: CPT | Performed by: SURGERY

## 2020-08-02 PROCEDURE — 85730 THROMBOPLASTIN TIME PARTIAL: CPT | Performed by: INTERNAL MEDICINE

## 2020-08-02 PROCEDURE — 94799 UNLISTED PULMONARY SVC/PX: CPT

## 2020-08-02 PROCEDURE — 99222 1ST HOSP IP/OBS MODERATE 55: CPT | Performed by: INTERNAL MEDICINE

## 2020-08-02 PROCEDURE — 25010000002 HEPARIN (PORCINE) PER 1000 UNITS: Performed by: INTERNAL MEDICINE

## 2020-08-02 PROCEDURE — 99233 SBSQ HOSP IP/OBS HIGH 50: CPT | Performed by: INTERNAL MEDICINE

## 2020-08-02 PROCEDURE — 71045 X-RAY EXAM CHEST 1 VIEW: CPT

## 2020-08-02 RX ORDER — HYDROXYZINE HYDROCHLORIDE 25 MG/1
25 TABLET, FILM COATED ORAL 3 TIMES DAILY PRN
Status: DISCONTINUED | OUTPATIENT
Start: 2020-08-02 | End: 2020-08-04 | Stop reason: HOSPADM

## 2020-08-02 RX ORDER — SODIUM CHLORIDE 0.9 % (FLUSH) 0.9 %
20 SYRINGE (ML) INJECTION AS NEEDED
Status: DISCONTINUED | OUTPATIENT
Start: 2020-08-02 | End: 2020-08-04 | Stop reason: HOSPADM

## 2020-08-02 RX ORDER — SODIUM CHLORIDE 0.9 % (FLUSH) 0.9 %
10 SYRINGE (ML) INJECTION EVERY 12 HOURS SCHEDULED
Status: DISCONTINUED | OUTPATIENT
Start: 2020-08-02 | End: 2020-08-04 | Stop reason: HOSPADM

## 2020-08-02 RX ORDER — AMLODIPINE BESYLATE 5 MG/1
5 TABLET ORAL
Status: DISCONTINUED | OUTPATIENT
Start: 2020-08-02 | End: 2020-08-03

## 2020-08-02 RX ORDER — SODIUM CHLORIDE 0.9 % (FLUSH) 0.9 %
10 SYRINGE (ML) INJECTION AS NEEDED
Status: DISCONTINUED | OUTPATIENT
Start: 2020-08-02 | End: 2020-08-04 | Stop reason: HOSPADM

## 2020-08-02 RX ORDER — LIDOCAINE HYDROCHLORIDE 10 MG/ML
INJECTION, SOLUTION INFILTRATION; PERINEURAL
Status: DISPENSED
Start: 2020-08-02 | End: 2020-08-03

## 2020-08-02 RX ADMIN — HYDROXYZINE HYDROCHLORIDE 25 MG: 25 TABLET, FILM COATED ORAL at 21:27

## 2020-08-02 RX ADMIN — SODIUM CHLORIDE, PRESERVATIVE FREE 10 ML: 5 INJECTION INTRAVENOUS at 20:03

## 2020-08-02 RX ADMIN — MONTELUKAST SODIUM 10 MG: 10 TABLET, COATED ORAL at 20:02

## 2020-08-02 RX ADMIN — ATORVASTATIN CALCIUM 40 MG: 40 TABLET, FILM COATED ORAL at 20:02

## 2020-08-02 RX ADMIN — IPRATROPIUM BROMIDE AND ALBUTEROL SULFATE 3 ML: .5; 3 SOLUTION RESPIRATORY (INHALATION) at 00:32

## 2020-08-02 RX ADMIN — ACETAMINOPHEN 650 MG: 325 TABLET ORAL at 21:27

## 2020-08-02 RX ADMIN — SODIUM CHLORIDE 5 MG/HR: 9 INJECTION, SOLUTION INTRAVENOUS at 05:01

## 2020-08-02 RX ADMIN — HYDRALAZINE HYDROCHLORIDE 50 MG: 50 TABLET, FILM COATED ORAL at 16:17

## 2020-08-02 RX ADMIN — SODIUM CHLORIDE, PRESERVATIVE FREE 10 ML: 5 INJECTION INTRAVENOUS at 20:01

## 2020-08-02 RX ADMIN — LABETALOL HYDROCHLORIDE 300 MG: 100 TABLET, FILM COATED ORAL at 08:39

## 2020-08-02 RX ADMIN — SODIUM CHLORIDE 10 MG/HR: 9 INJECTION, SOLUTION INTRAVENOUS at 09:13

## 2020-08-02 RX ADMIN — LABETALOL HYDROCHLORIDE 300 MG: 100 TABLET, FILM COATED ORAL at 16:17

## 2020-08-02 RX ADMIN — AMLODIPINE BESYLATE 5 MG: 5 TABLET ORAL at 20:01

## 2020-08-02 RX ADMIN — LABETALOL HYDROCHLORIDE 300 MG: 100 TABLET, FILM COATED ORAL at 20:03

## 2020-08-02 RX ADMIN — BUPROPION HYDROCHLORIDE 200 MG: 100 TABLET, EXTENDED RELEASE ORAL at 08:39

## 2020-08-02 RX ADMIN — SODIUM CHLORIDE 5 MG/HR: 9 INJECTION, SOLUTION INTRAVENOUS at 20:26

## 2020-08-02 RX ADMIN — SODIUM CHLORIDE 5 MG/HR: 9 INJECTION, SOLUTION INTRAVENOUS at 15:12

## 2020-08-02 RX ADMIN — ISOSORBIDE MONONITRATE 120 MG: 60 TABLET ORAL at 08:39

## 2020-08-02 RX ADMIN — SODIUM CHLORIDE, PRESERVATIVE FREE 10 ML: 5 INJECTION INTRAVENOUS at 20:02

## 2020-08-02 RX ADMIN — SODIUM CHLORIDE 10 MG/HR: 9 INJECTION, SOLUTION INTRAVENOUS at 12:04

## 2020-08-02 RX ADMIN — HEPARIN SODIUM 10 UNITS/KG/HR: 10000 INJECTION, SOLUTION INTRAVENOUS at 11:37

## 2020-08-02 RX ADMIN — ASPIRIN 81 MG: 81 TABLET, COATED ORAL at 08:39

## 2020-08-02 RX ADMIN — SODIUM CHLORIDE 10 MG/HR: 9 INJECTION, SOLUTION INTRAVENOUS at 00:52

## 2020-08-02 RX ADMIN — CLONIDINE HYDROCHLORIDE 0.2 MG: 0.2 TABLET ORAL at 20:02

## 2020-08-02 RX ADMIN — CLONIDINE HYDROCHLORIDE 0.2 MG: 0.2 TABLET ORAL at 08:39

## 2020-08-02 RX ADMIN — TAMSULOSIN HYDROCHLORIDE 0.4 MG: 0.4 CAPSULE ORAL at 20:02

## 2020-08-02 RX ADMIN — IPRATROPIUM BROMIDE AND ALBUTEROL SULFATE 3 ML: .5; 3 SOLUTION RESPIRATORY (INHALATION) at 07:07

## 2020-08-02 RX ADMIN — HYDRALAZINE HYDROCHLORIDE 50 MG: 50 TABLET, FILM COATED ORAL at 08:39

## 2020-08-02 RX ADMIN — CLONIDINE HYDROCHLORIDE 0.2 MG: 0.2 TABLET ORAL at 16:17

## 2020-08-02 RX ADMIN — HYDRALAZINE HYDROCHLORIDE 50 MG: 50 TABLET, FILM COATED ORAL at 20:02

## 2020-08-02 RX ADMIN — IPRATROPIUM BROMIDE AND ALBUTEROL SULFATE 3 ML: .5; 3 SOLUTION RESPIRATORY (INHALATION) at 13:26

## 2020-08-02 RX ADMIN — SODIUM CHLORIDE, PRESERVATIVE FREE 10 ML: 5 INJECTION INTRAVENOUS at 08:40

## 2020-08-02 NOTE — PROGRESS NOTES
[facility] HOSPITALIST PROGRESS NOTE     Patient Identification:  Name:  Sudarshan Keene  Age:  45 y.o.  Sex:  male  :  1975  MRN:  [id number]  Visit Number:  [id number]  Primary Care Provider:  Provider, No Known    Length of stay:  1    Chief complaint:  45 y.o. old male admitted with No chief complaint on file.          Subjective:    Patient seen with nursing staff.  No family present at bedside.  Patient is lying comfortably in bed and states that she is feeling much better.  Patient denies any chest pain, dyspnea, palpitations, nausea or vomiting.  Patient has been off of nitroglycerin drip since this morning and Cardene drip is being titrated down.  Blood pressure is improved compared to yesterday.  No other issues reported by the nursing staff.           Current Hospital Meds:    aspirin 81 mg Oral Daily   atorvastatin 40 mg Oral Nightly   buPROPion  mg Oral Daily   cloNIDine 0.2 mg Oral TID   hydrALAZINE 50 mg Oral TID   ipratropium-albuterol 3 mL Nebulization 4x Daily - RT   isosorbide mononitrate 120 mg Oral Daily   labetalol 300 mg Oral TID   montelukast 10 mg Oral Nightly   sodium chloride 10 mL Intravenous Q12H   sodium chloride 10 mL Intravenous Q12H   tamsulosin 0.4 mg Oral Nightly       heparin (porcine) 5.99 Units/kg/hr Last Rate: 9.99 Units/kg/hr (20 0154)   niCARdipine 5-15 mg/hr Last Rate: 10 mg/hr (20 0913)   nitroglycerin 5-200 mcg/min Last Rate: Stopped (20 1758)     ----------------------------------------------------------------------------------------------------------------------  Vital Signs:  Temp:  [97.9 °F (36.6 °C)-98.9 °F (37.2 °C)] 98.7 °F (37.1 °C)  Heart Rate:  [] 93  Resp:  [12-26] 18  BP: (120-245)/() 157/93      20  1434 20  0300   Weight: (!) 167 kg (368 lb 8 oz) (!) 167 kg (369 lb)     Body mass index is 54.49 kg/m².    Intake/Output Summary (Last 24 hours) at 2020 1125  Last data filed at 2020  0904  Gross per 24 hour   Intake 1783.11 ml   Output 4300 ml   Net -2516.89 ml     Diet Regular; Cardiac, Low Sodium  ----------------------------------------------------------------------------------------------------------------------  Physical exam:  Constitutional: Morbidly obese male, lying in bed, no acute distress.   HENT:  Head:  Normocephalic and atraumatic.  Mouth:  Moist mucous membranes.    Eyes:  Pupils are equal, round, and reactive to light.   Neck:  Neck supple.  No JVD present.    Cardiovascular:  Regular rate and rhythm. S1+S2. No murmur, rubs or gallops.   Lungs/Chest: Clear to auscultation bilaterally.  Patient has a port in right upper chest.  Abdomen:  Soft. Nontender. No viscera palpable.  Bowel sounds audible.   Musculoskeletal: No deformity or joint swelling.   Peripheral vascular: Bilateral dorsalis pedis palpable.  Bilateral lower extremity minimal pitting edema.  Neurological:  Alert and oriented to person, place, and time.  Cranial nerves grossly intact. Strength bilaterally symmetrical in upper and lower extremities.   Skin:  Skin is warm and dry. No rash noted. No pallor.  ----------------------------------------------------------------------------------------------------------------------  Tele: Sinus rhythm  ----------------------------------------------------------------------------------------------------------------------  Results from last 7 days   Lab Units 08/01/20  1909 08/01/20  1528   CK TOTAL U/L  --  315*   CKMB ng/mL  --  8.78   TROPONIN T ng/mL 0.049* 0.059*     Results from last 7 days   Lab Units 08/01/20  1529 08/01/20  1528   CRP mg/dL  --  1.54*   WBC 10*3/mm3  --  5.77   HEMOGLOBIN g/dL  --  12.7*   HEMATOCRIT %  --  41.3   MCV fL  --  84.6   MCHC g/dL  --  30.8*   PLATELETS 10*3/mm3  --  190   INR  0.91  --      Results from last 7 days   Lab Units 08/01/20  1700   PH, ARTERIAL pH units 7.358   PO2 ART mm Hg 63.3*   PCO2, ARTERIAL mm Hg 45.5*   HCO3 ART mmol/L  25.6     Results from last 7 days   Lab Units 08/01/20  1528   SODIUM mmol/L 141   POTASSIUM mmol/L 4.0   MAGNESIUM mg/dL 2.0   CHLORIDE mmol/L 103   CO2 mmol/L 24.7   BUN mg/dL 37*   CREATININE mg/dL 2.41*   EGFR IF AFRICN AM mL/min/1.73 35*   CALCIUM mg/dL 8.6   GLUCOSE mg/dL 80   ALBUMIN g/dL 3.40*   BILIRUBIN mg/dL 0.2   ALK PHOS U/L 72   AST (SGOT) U/L 21   ALT (SGPT) U/L 22   Estimated Creatinine Clearance: 59.7 mL/min (A) (by C-G formula based on SCr of 2.41 mg/dL (H)).    No results found for: AMMONIA  Results from last 7 days   Lab Units 08/01/20  1528   CHOLESTEROL mg/dL 153   TRIGLYCERIDES mg/dL 65   HDL CHOL mg/dL 54   LDL CHOL mg/dL 86     No results found for: BLOODCX  No results found for: URINECX  No results found for: WOUNDCX  No results found for: STOOLCX    I have personally looked at the labs and they are summarized above.  ----------------------------------------------------------------------------------------------------------------------  Imaging Results (Last 24 Hours)     Procedure Component Value Units Date/Time    NM Lung Scan Perfusion Particulate [[id number]] Collected:  08/01/20 2000     Updated:  08/01/20 2002    Narrative:       NM Pulm Perf Imaging    HISTORY:  45-year-old male with chest pain, shortness of breath, and elevated d-dimer today.    DOSE:  *  4 mCi Tc99m MAA.    COMPARISON:   Correlation made to chest x-ray 8/1/2020.    FINDINGS:   Perfusion only imaging:  There is uniform distribution of radiotracer throughout both lungs.       Impression:       No focal perfusion defects. Low probability for pulmonary vascular occlusive disease.    Signer Name: Isidoro Aldrich MD   Signed: 8/1/2020 8:00 PM   Workstation Name: [email or web address]    [facility]    US Venous Doppler Lower Extremity Bilateral (duplex) [[id number]] Collected:  08/01/20 1709     Updated:  08/01/20 1711    Narrative:       PROCEDURE: US Veins LE Duplex BILAT    COMPARISON: No comparison study  or studies for correlation purposes available at time of dictation.     INDICATIONS: rule out DVT, elevated d-dimer patient has a 2 year history of swelling in both lower extremities    FINDINGS:  Gray-scale, color flow and Duplex spectral waveform analysis and interrogation of bilateral lower extremity venous structures is performed with augmentation if possible..   Common femoral, deep femoral, superficial femoral, popliteal, peroneal and posterior tibial veins interrogated by department protocol.    These interrogated vessels demonstrate normal compressibility, color flow and augmentation upon distal calf compression. There is no evidence of deep venous thrombosis in either leg.      Impression:       No evidence of DVT in either lower extremity.    Signer Name: Ailyn Shea MD   Signed: 8/1/2020 5:09 PM   Workstation [facility]: [id number]    [facility]    XR Chest 1 View [[id number]] Collected:  08/01/20 1554     Updated:  08/01/20 1612    Narrative:       EXAMINATION: XR CHEST 1 VW-      CLINICAL INDICATION:     chest pain, port position assessment     TECHNIQUE: Single AP view of chest.      COMPARISON: EXAMINATION: XR CHEST 1 VW-      CLINICAL INDICATION:     chest pain, port position assessment     TECHNIQUE:  XR CHEST 1 VW-      COMPARISON: NONE      FINDINGS:   The lungs remain aerated.  Heart and mediastinum contours are unremarkable.  No pleural effusion.  No pneumothorax.   Bony and soft tissue structures are unremarkable.       Impression:       No radiographic evidence of acute cardiac or pulmonary  disease.        FINDINGS: Right port with tip in SVC.  There is bibasilar atelectasis.   Lungs are otherwise aerated.  Support tube and lines are in unchanged position.   Heart size is stable.  No pneumothorax.   Tiny bilateral pleural effusions persist.      IMPRESSION: Stable appearance of the chest.     This report was finalized on 8/1/2020 3:54 PM by Dr. Stu Simpson MD.            ----------------------------------------------------------------------------------------------------------------------  Assessment and Plan:    -Hypertensive urgency/emergency  Improving.  Patient has had improvement in his blood pressure since yesterday and is off of nitroglycerin drip.  He is still on Cardene drip but blood pressure is improving it is being titrated down.  Continue clonidine, hydralazine, isosorbide mononitrate and labetalol.  Continue to monitor blood pressure closely and adjust antihypertensives as needed.    -NSTEMI  Patient is chest pain-free.  Troponin was elevated to 0.059 on admission and trended down.  EKG showed no acute ST-T wave changes.  Continue aspirin, beta-blocker and statin.  Avoiding ACE inhibitor due to elevated creatinine.    -Acute on chronic diastolic CHF  Patient had elevated BNP at admission.  He diuresed well and net fluid balance is -2800 mL since admission.    Echocardiogram showed normal LV systolic function, EF of 51-55%, severe concentric hypertrophy, grade 2 diastolic dysfunction, mild MVR, mild TVR, no evidence of pericardial effusion.  Continue hydralazine and nitrate combination.  Continue to monitor intake and output.    -COPD  No S/S of acute exacerbation.  Continue duo nebs.    -Adjustment disorder with history of panic attacks  Continue Wellbutrin.  Patient had alprazolam listed as his home medication and has been receiving alprazolam from Dr. Azar in [place].  His last prescription was on 6/15/2020 but since his urine drug screen has been negative here for benzodiazepines and and patient has not had a prescription in last 30 days, I will discontinue alprazolam.    -Substance abuse  Urine drug screen was positive for THC and opiates.  Patient admits to using marijuana regularly.    -Elevated d-dimer  VQ scan was low probability for PE and lower extremity Doppler ultrasound showed no evidence of DVT.    -ANSELMO vs. acute on CKD  Creatinine is elevated 2.4  on admission, previously creatinine was 1.2 in 2015.  I will obtain renal ultrasound.  Avoid nephrotoxic agents.  Monitor renal function closely.    Activity: Up with assist  Nutrition: Regular diet  Fluids: NS at 125 mL/h  DVT prophylaxis: Heparin Subcu  GI prophylaxis: Pepcid      The patient is high risk due to: Hypertensive urgency, hypertensive urgency, acute kidney injury, diastolic CHF    I discussed the patient's findings and my recommendations with patient and nursing staff.    Jake Gustafson MD  08/02/20  11:25

## 2020-08-02 NOTE — NURSING NOTE
Progressive Mobility    Date: 08/02/20     Mobility Initiation Contradictions: None    Day of Mobility  Activity Performed: stands at bedside to use urinal   Patient: PROM  Assisted by 1    Device(s) used:none  Terri Vargas RN   08/02/20

## 2020-08-02 NOTE — OP NOTE
Central line insertion subclavian    Surgeon:  Brittany Luz M.D., MALLIKA Keene      Pre op Diagnosis: poor iv access  Post op Diagnosis:  same    Anesthesia: 1% lidocaine    Procedure:  After obtaining informed consent patient was placed in the Trendelenburg position.  With use of the Seldinger technique the left subclavian vein was cannulated.  Guidewire was passed without resistance.  The tract was dilated and the triple-lumen catheter was passed to 20 cm and sutured in place. Good blood return was obtained from all 3 ports.  Dressing was placed.    CXR result: pending    Blood administered:  none      Brittany Luz MD     Date: 8/2/2020  Time: 13:05

## 2020-08-02 NOTE — PLAN OF CARE
Problem: Patient Care Overview  Goal: Plan of Care Review  Outcome: Ongoing (interventions implemented as appropriate)  Flowsheets (Taken 8/2/2020 0330)  Progress: no change  Plan of Care Reviewed With: patient  Outcome Summary: Cardene gtt titrated, no chest pain  reported at this time. Bipap throughout night. Afebrile. Continue to monitor.     Problem: Pain, Chronic (Adult)  Goal: Identify Related Risk Factors and Signs and Symptoms  Outcome: Ongoing (interventions implemented as appropriate)     Problem: Pain, Chronic (Adult)  Goal: Acceptable Pain/Comfort Level and Functional Ability  Outcome: Ongoing (interventions implemented as appropriate)  Flowsheets (Taken 8/2/2020 0330)  Acceptable Pain/Comfort Level and Functional Ability: making progress toward outcome     Problem: Cardiac: ACS (Acute Coronary Syndrome) (Adult)  Goal: Signs and Symptoms of Listed Potential Problems Will be Absent, Minimized or Managed (Cardiac: ACS)  Outcome: Ongoing (interventions implemented as appropriate)  Flowsheets (Taken 8/2/2020 0330)  Problems Assessed (Acute Coronary Syndrome): all     Problem: Fall Risk (Adult)  Goal: Identify Related Risk Factors and Signs and Symptoms  Outcome: Ongoing (interventions implemented as appropriate)     Problem: Fall Risk (Adult)  Goal: Absence of Fall  Outcome: Ongoing (interventions implemented as appropriate)  Flowsheets (Taken 8/2/2020 0330)  Absence of Fall: making progress toward outcome     Problem: Diabetes, Type 2 (Adult)  Goal: Signs and Symptoms of Listed Potential Problems Will be Absent, Minimized or Managed (Diabetes, Type 2)  Outcome: Ongoing (interventions implemented as appropriate)  Flowsheets (Taken 8/2/2020 0330)  Problems Assessed (Type 2 Diabetes): all     Problem: Skin Injury Risk (Adult)  Goal: Identify Related Risk Factors and Signs and Symptoms  Outcome: Ongoing (interventions implemented as appropriate)

## 2020-08-02 NOTE — NURSING NOTE
Progressive Mobility    Date: 08/02/20     Mobility Initiation Contradictions: None    Day of Mobility 1 Activity Performed: AROM, assist up to bedside, patient is steady standing at bedside.    Patient: tolerated    Assisted by 1 person/persons    Device(s) used: none    Sharon Crisostomo RN   08/02/20

## 2020-08-02 NOTE — CONSULTS
Consults  Date of Admit: 8/1/2020  Date of Consult: 08/02/20  Jake Gustafson MD  Sudarshan Keene  1975  Consulting Physician: Dr. Gilbert    Cardiology consultation    Reason for consultation: NSTEMI and hypertension emergency    Assessment and Plan:     1. Hypertension emergency, now improved, hx of medical non compliance and recurrent issues with uncontrolled BP  2. Hypertensive heart disease, Echo showed severe LVH, no significant obstruction, will need better long term BP control   3. CKD stage III, will need outpatient evaluation for renovascular HTN, likely get CT angiogram r/o AVINASH  4. Pt has had multiple evaluations in past at different facility, he has had cardiac cath at [facility] per pt no CAD. Will get records. His mild troponin elevation is likely from uncontrolled HTN and CKD, trending down,no chest pain currently. Echo showed no wall motion changes. Recommend D/C IV heparin drip if initiated for this.   5. Recommend adding Amlodipine 5 mg and increase Hydralazine to 100 mg and titrate down the IV cardene drip. Increase Amlodipine to 10 as BP tolerates.   6. Discussed about compliance in detail with pt. Will need outpatient F/u in 2-4 weeks after D/C            History of Present Illness    Subjective     Chief Compliant: Chest pains  Sudarshan Keene is a 45 y.o. male with past medical history significant for no known history of coronary artery disease however patient does have history of chronic diastolic heart failure, chronic kidney disease stage III, essential hypertension, and dyslipidemia.  Patient presented to [facility] after transfer from [facility] due to chest pains with elevated troponin.    Speaking to the patient patient reports that his chest pain started early Saturday morning and middle of the night roughly at 3 AM which was on 8/1/2020.  Patient reports he was asleep when his chest pains awoke him that he describes as a squeezing sensation in his right  side of his chest that did not radiate anywhere.  He does report that the pain was severe enough where he decided to take nitroglycerin and took up to 3 doses with no relief which prompted him to go to the local emergency department.  He does report that this chest pain was associated with diaphoresis, shortness of breath, and nausea.  He denies any history of tobacco abuse or diabetes.        Echocardiogram on 2020  Interpretation Summary   · Left ventricular systolic function is normal. Estimated EF appears to be in the range of 51 - 55%  · Left ventricular wall thickness is consistent with severe concentric hypertrophy.  · Left ventricular diastolic dysfunction (grade II) consistent with pseudonormalization.  · Mild mitral valve regurgitation is present  · Mild tricuspid valve regurgitation is present.  · There is no evidence of pericardial effusion.  · No previous study to compare           Past Medical History:   Diagnosis Date   • Arthritis    • Asthma    • CHF (congestive heart failure) (CMS/[facility]ly [facility])    • COPD (chronic obstructive pulmonary disease) (CMS/Formerly [facility])    • Coronary artery disease    • Hypertension    • Sleep apnea      Past Surgical History:   Procedure Laterality Date   • CARDIAC CATHETERIZATION  2008   • HERNIA REPAIR       Family History   Problem Relation Age of Onset   • Hypertension Mother    • Hypertension Father    • Hypertension Sister    • Heart disease Sister      Social History     Tobacco Use   • Smoking status: Former Smoker     Packs/day: 0.25     Types: Cigarettes     Last attempt to quit: 2014     Years since quittin.0   • Smokeless tobacco: Never Used   Substance Use Topics   • Alcohol use: Never     Frequency: Never   • Drug use: Yes     Frequency: 7.0 times per week     Types: Marijuana     Comment: tobacco free     Medications Prior to Admission   Medication Sig Dispense Refill Last Dose   • albuterol (PROVENTIL) (2.5 MG/3ML) 0.083% nebulizer solution Take 2.5 mg  by nebulization Every 4 (Four) Hours As Needed for Wheezing.   Past Week at Unknown time   • albuterol sulfate  (90 Base) MCG/ACT inhaler Inhale 2 puffs Every 4 (Four) Hours As Needed for Wheezing.   Past Week at Unknown time   • ALPRAZolam (XANAX) 1 MG tablet Take 1 mg by mouth 2 (Two) Times a Day As Needed for Anxiety.   7/31/2020 at pm   • aspirin 81 MG EC tablet Take 81 mg by mouth Daily.   7/31/2020 at am   • atorvastatin (LIPITOR) 40 MG tablet Take 40 mg by mouth Every Night.   7/31/2020 at pm   • bumetanide (BUMEX) 1 MG tablet Take 1 mg by mouth Daily.   7/31/2020 at am   • buPROPion SR (WELLBUTRIN SR) 100 MG 12 hr tablet Take 200 mg by mouth Daily.   7/31/2020 at am   • cloNIDine (CATAPRES) 0.2 MG tablet Take 0.2 mg by mouth 3 (Three) Times a Day.   7/31/2020 at pm   • hydrALAZINE (APRESOLINE) 50 MG tablet Take 50 mg by mouth 3 (Three) Times a Day.   7/31/2020 at pm   • isosorbide mononitrate (IMDUR) 120 MG 24 hr tablet Take 120 mg by mouth Daily.   7/31/2020 at am   • labetalol (NORMODYNE) 300 MG tablet Take 300 mg by mouth 3 (Three) Times a Day.   7/31/2020 at pm   • losartan (COZAAR) 25 MG tablet Take 25 mg by mouth Daily.   7/31/2020 at am   • montelukast (SINGULAIR) 10 MG tablet Take 10 mg by mouth Every Night.   7/31/2020 at pm   • tamsulosin (FLOMAX) 0.4 MG capsule 24 hr capsule Take 1 capsule by mouth Every Night.   7/31/2020 at pm   • tiotropium (SPIRIVA) 18 MCG per inhalation capsule Place 1 capsule into inhaler and inhale Daily.   7/31/2020 at am     Allergies:  Patient has no known allergies.      Current Facility-Administered Medications:   •  acetaminophen (TYLENOL) tablet 650 mg, 650 mg, Oral, Q4H PRN **OR** acetaminophen (TYLENOL) 160 MG/5ML solution 650 mg, 650 mg, Oral, Q4H PRN **OR** acetaminophen (TYLENOL) suppository 650 mg, 650 mg, Rectal, Q4H PRN, Jake Gustafson MD  •  ALPRAZolam (XANAX) tablet 1 mg, 1 mg, Oral, BID PRN, Jake Gustafson MD  •  aluminum-magnesium  hydroxide-simethicone (MAALOX MAX) 400-400-40 MG/5ML suspension 15 mL, 15 mL, Oral, Q6H PRN, Jake Gustafson MD  •  aspirin EC tablet 81 mg, 81 mg, Oral, Daily, Jake Gustafson MD, 81 mg at 08/02/20 0839  •  atorvastatin (LIPITOR) tablet 40 mg, 40 mg, Oral, Nightly, Jake Gustafson MD, 40 mg at 08/01/20 2113  •  bisacodyl (DULCOLAX) EC tablet 5 mg, 5 mg, Oral, Daily PRN, Jake Gustafson MD  •  bisacodyl (DULCOLAX) suppository 10 mg, 10 mg, Rectal, Daily PRN, Jake Gustafson MD  •  buPROPion SR (WELLBUTRIN SR) 12 hr tablet 200 mg, 200 mg, Oral, Daily, Jake Gustafson MD, 200 mg at 08/02/20 0839  •  cloNIDine (CATAPRES) tablet 0.2 mg, 0.2 mg, Oral, TID, Jake Gustafson MD, 0.2 mg at 08/02/20 0839  •  docusate sodium (COLACE) capsule 100 mg, 100 mg, Oral, BID PRN, Jake Gustafson MD  •  heparin (porcine) 5000 UNIT/ML injection 2,500 Units, 2,500 Units, Intravenous, PRN, Jake Gustafson MD  •  heparin (porcine) 5000 UNIT/ML injection 5,000 Units, 5,000 Units, Intravenous, PRN, Jake Gustafson MD  •  heparin 51095 units/250 mL (100 units/mL) in 0.45 % NaCl infusion, 5.99 Units/kg/hr, Intravenous, Titrated, Jake Gustafson MD, Last Rate: 16.68 mL/hr at 08/02/20 0154, 9.99 Units/kg/hr at 08/02/20 0154  •  hydrALAZINE (APRESOLINE) tablet 50 mg, 50 mg, Oral, TID, Jake Gustafson MD, 50 mg at 08/02/20 0839  •  ipratropium-albuterol (DUO-NEB) nebulizer solution 3 mL, 3 mL, Nebulization, 4x Daily - RT, Jake Gustafson MD, 3 mL at 08/02/20 0707  •  isosorbide mononitrate (IMDUR) 24 hr tablet 120 mg, 120 mg, Oral, Daily, Jake Gustafson MD, 120 mg at 08/02/20 0839  •  labetalol (NORMODYNE) tablet 300 mg, 300 mg, Oral, TID, Jake Gustafson MD, 300 mg at 08/02/20 0839  •  magnesium hydroxide (MILK OF MAGNESIA) suspension 2400 mg/10mL 10 mL, 10 mL, Oral, Daily PRN, Jake Gustafson MD  •  montelukast (SINGULAIR) tablet 10 mg, 10 mg, Oral, Nightly, Janay  Jake Edgar MD, 10 mg at 08/01/20 2113  •  niCARdipine (CARDENE) 25 mg in 250 mL NS (0.1 mg/mL) infusion, 5-15 mg/hr, Intravenous, Titrated, Jake Gustafson MD, Last Rate: 100 mL/hr at 08/02/20 0913, 10 mg/hr at 08/02/20 0913  •  nitroglycerin (TRIDIL) 200 mcg/ml infusion, 5-200 mcg/min, Intravenous, Titrated, Jake Gustafson MD, Stopped at 08/01/20 1758  •  ondansetron (ZOFRAN) tablet 4 mg, 4 mg, Oral, Q6H PRN **OR** ondansetron (ZOFRAN) injection 4 mg, 4 mg, Intravenous, Q6H PRN, Jake Gustafson MD  •  sodium chloride 0.9 % flush 10 mL, 10 mL, Intravenous, Q12H, Jake Gustafson MD, 10 mL at 08/01/20 2114  •  sodium chloride 0.9 % flush 10 mL, 10 mL, Intravenous, PRN, Jake Gustafson MD  •  sodium chloride 0.9 % flush 10 mL, 10 mL, Intravenous, Q12H, Jake Gustafson MD, 10 mL at 08/02/20 0840  •  sodium chloride 0.9 % flush 10 mL, 10 mL, Intravenous, PRN, Jake Gustafson MD  •  tamsulosin (FLOMAX) 24 hr capsule 0.4 mg, 0.4 mg, Oral, Nightly, Jake Gustafson MD, 0.4 mg at 08/01/20 2113    Review of Systems   Constitutional: Negative for diaphoresis and fatigue.   HENT: Negative for congestion and nosebleeds.    Respiratory: Positive for chest tightness and shortness of breath.    Cardiovascular: Positive for chest pain. Negative for palpitations.   Gastrointestinal: Negative for abdominal pain and blood in stool.   Genitourinary: Negative for dysuria and hematuria.   Musculoskeletal: Negative for arthralgias and back pain.   Skin: Negative for color change and pallor.   Neurological: Negative for dizziness and seizures.   Hematological: Negative for adenopathy. Does not bruise/bleed easily.   Psychiatric/Behavioral: Negative for agitation and behavioral problems.         Objective      Vital Signs  Temp:  [97.9 °F (36.6 °C)-98.9 °F (37.2 °C)] 98.7 °F (37.1 °C)  Heart Rate:  [] 93  Resp:  [12-26] 18  BP: (120-245)/() 157/93  Body mass index is 54.49  kg/m².    Intake/Output Summary (Last 24 hours) at 8/2/2020 0949  Last data filed at 8/2/2020 0904  Gross per 24 hour   Intake 1783.11 ml   Output 4300 ml   Net -2516.89 ml       Physical Exam   Constitutional: He is oriented to person, place, and time. He appears well-developed and well-nourished. No distress.   HENT:   Head: Normocephalic and atraumatic.   Eyes: Pupils are equal, round, and reactive to light. Right eye exhibits no discharge. Left eye exhibits no discharge.   Neck: No JVD present. No tracheal deviation present.   Cardiovascular: Normal rate and regular rhythm.   Pulmonary/Chest: Effort normal and breath sounds normal.   Abdominal: Soft. Bowel sounds are normal.   Musculoskeletal: He exhibits no edema or deformity.   Neurological: He is alert and oriented to person, place, and time.   Skin: Skin is warm and dry. He is not diaphoretic.   Psychiatric: He has a normal mood and affect. His behavior is normal.         Results Review:   I reviewed the patient's new clinical results.  Results from last 7 days   Lab Units 08/01/20  1909 08/01/20  1528   CK TOTAL U/L  --  315*   TROPONIN T ng/mL 0.049* 0.059*   CKMB ng/mL  --  8.78     Results from last 7 days   Lab Units 08/01/20  1528   WBC 10*3/mm3 5.77   HEMOGLOBIN g/dL 12.7*   PLATELETS 10*3/mm3 190     Results from last 7 days   Lab Units 08/01/20  1528   SODIUM mmol/L 141   POTASSIUM mmol/L 4.0   CHLORIDE mmol/L 103   CO2 mmol/L 24.7   BUN mg/dL 37*   CREATININE mg/dL 2.41*   CALCIUM mg/dL 8.6   GLUCOSE mg/dL 80   ALT (SGPT) U/L 22   AST (SGOT) U/L 21     Lab Results   Component Value Date    INR 0.91 08/01/2020     Lab Results   Component Value Date    MG 2.0 08/01/2020     Lab Results   Component Value Date    TSH 1.670 08/01/2020    CHLPL 167 12/23/2015    TRIG 65 08/01/2020    HDL 54 08/01/2020    LDL 86 08/01/2020      Lab Results   Component Value Date     (H) 12/21/2015         Imaging Results (Last 72 Hours)     Procedure Component  Value Units Date/Time    NM Lung Scan Perfusion Particulate [[id number]] Collected:  08/01/20 2000     Updated:  08/01/20 2002    Narrative:       NM Pulm Perf Imaging    HISTORY:  45-year-old male with chest pain, shortness of breath, and elevated d-dimer today.    DOSE:  *  4 mCi Tc99m MAA.    COMPARISON:   Correlation made to chest x-ray 8/1/2020.    FINDINGS:   Perfusion only imaging:  There is uniform distribution of radiotracer throughout both lungs.       Impression:       No focal perfusion defects. Low probability for pulmonary vascular occlusive disease.    Signer Name: Isidoro Aldrich MD   Signed: 8/1/2020 8:00 PM   Workstation Name: [email or web address]    [facility]    US Venous Doppler Lower Extremity Bilateral (duplex) [[id number]] Collected:  08/01/20 1709     Updated:  08/01/20 1711    Narrative:       PROCEDURE: US Veins LE Duplex BILAT    COMPARISON: No comparison study or studies for correlation purposes available at time of dictation.     INDICATIONS: rule out DVT, elevated d-dimer patient has a 2 year history of swelling in both lower extremities    FINDINGS:  Gray-scale, color flow and Duplex spectral waveform analysis and interrogation of bilateral lower extremity venous structures is performed with augmentation if possible..   Common femoral, deep femoral, superficial femoral, popliteal, peroneal and posterior tibial veins interrogated by department protocol.    These interrogated vessels demonstrate normal compressibility, color flow and augmentation upon distal calf compression. There is no evidence of deep venous thrombosis in either leg.      Impression:       No evidence of DVT in either lower extremity.    Signer Name: Ailny Shea MD   Signed: 8/1/2020 5:09 PM   Workstation Name: [email or web address]    [facility]    XR Chest 1 View [[id number]] Collected:  08/01/20 1554     Updated:  08/01/20 1612    Narrative:       EXAMINATION: XR CHEST 1 VW-       CLINICAL INDICATION:     chest pain, port position assessment     TECHNIQUE: Single AP view of chest.      COMPARISON: EXAMINATION: XR CHEST 1 VW-      CLINICAL INDICATION:     chest pain, port position assessment     TECHNIQUE:  XR CHEST 1 VW-      COMPARISON: NONE      FINDINGS:   The lungs remain aerated.  Heart and mediastinum contours are unremarkable.  No pleural effusion.  No pneumothorax.   Bony and soft tissue structures are unremarkable.       Impression:       No radiographic evidence of acute cardiac or pulmonary  disease.        FINDINGS: Right port with tip in SVC.  There is bibasilar atelectasis.   Lungs are otherwise aerated.  Support tube and lines are in unchanged position.   Heart size is stable.  No pneumothorax.   Tiny bilateral pleural effusions persist.      IMPRESSION: Stable appearance of the chest.     This report was finalized on 8/1/2020 3:54 PM by Dr. Stu Simpson MD.              Thank you very much for asking us to be involved in this patient's care.  We will follow along with you.    Allan Conner PA-C I discussed the case and plan of care of with Dr. Gilbert who reviewed, examined, and agreed with plan.     08/02/20  9:49 AM

## 2020-08-02 NOTE — PAYOR COMM NOTE
"[facility]  GINETTE MCNEILL  PHONE  [phone]  FAX  [phone]  NPI:  [id number]    ADMISSION NOTIFICATION    Elisha Keene (45 y.o. Male)     Date of Birth Social Security Number Address Home Phone MRN    1975  [address] [facility] [address] [phone] [id number]    Worship Marital Status          None Single       Admission Date Admission Type Admitting Provider Attending Provider Department, Room/Bed    20 Urgent Jake Gustafson MD Khan, Hafiz Sarfraz, MD [facility] CRITICAL CARE, CC07/    Discharge Date Discharge Disposition Discharge Destination                       Attending Provider:  Jake Gustafson MD    Allergies:  No Known Allergies    Isolation:  None   Infection:  None   Code Status:  CPR    Ht:  175.3 cm (69\")   Wt:  167 kg (369 lb)    Admission Cmt:  None   Principal Problem:  None                Active Insurance as of 2020     Primary Coverage     Payor Plan Insurance Group Employer/Plan Group    ANTHEM MEDICARE REPLACEMENT ANTHEM MEDICARE ADVANTAGE [id number]     Payor Plan Address Payor Plan Phone Number Payor Plan Fax Number Effective Dates    [address] [phone]  2020 - None Entered    [facility] [id number]       Subscriber Name Subscriber Birth Date Member ID       ELISHA KEEEN 1975 [id number]                 Emergency Contacts      (Rel.) Home Phone Work Phone Mobile Phone    LUI,HUAN (Other) [phone] -- --    holland caruso (Mother) -- -- [phone]               History & Physical      Jake Gustafson MD at 20 1501              [facility] HOSPITALIST HISTORY AND PHYSICAL    Patient Identification:  Name:  Elisha Keene  Age:  45 y.o.  Sex:  male  :  1975  MRN:  [id number]   Visit Number:  [id number]  Primary Care Physician:  Provider, No Known         Chief complaint: Chest pain    History of presenting illness:    Elisha Keene is a 45 y.o. male with " PMH significant for nonobstructive coronary artery disease (patient had a heart cath in 2008 but no stents were placed), CHF, COPD, uncontrolled hypertension, sleep apnea and arthritis presented to the outside hospital emergency department chest pain.  Patient was found to have systolic blood pressure greater than 200.  He was also complaining of chest pain and troponins were found to be elevated and patient was transferred to our facility for further evaluation management.    Patient states that he woke up this morning because of his panic attack.  Patient states that he has had panic attacks off and on.  Patient states that he started having chest pain thereafter.  Patient states that the pain was localized in right lower chest around midclavicular line in 5th-6th intercostal space.  Patient states the pain was like a muscle spasm that he thought would go away but pain persisted.  Patient said the pain lasted for about 25 minutes.  He states that he took 3 nitro glycerin but did not have any improvement in his chest pain.  Patient therefore presented to the emergency department.  Patient states that the pain was associated with nausea and vomiting.  Patient also states that he has had some dizziness and lightheadedness and shortness of breath.  Patient states that he has had similar chest pain in the past as well.  He states that he had similar chest pain when he underwent cardiac catheterization in 2008.  Per report from ER physician at [place] where patient initially presented, patient has had multiple presentations to the ER with chest pain and was advised admission but had left AGAINST MEDICAL ADVICE because he said that he is feeling better.  Patient denies history of smoking but states that he uses marijuana daily and last use was 12 hours ago.  He denies any alcohol use.       Review of systems:  Constitutional: Patient denies any fevers, chills, lethargy, weight loss or night sweats.  Vision and  hearing: Patient denies any acute changes in vision and hearing.  Respiratory: Patient denies any cough, hemoptysis, dyspnea.  Cardiovascular: Patient is chest pain-free at this time but had chest pain earlier as per HPI.  GI: Patient denies any nausea, vomiting, hematemesis, melena and hematochezia  Genitourinary: Patient denies any dysuria, frequency, urgency or hematuria.  Neurologic: Patient denies any dizziness, lightheadedness, seizures, syncope.  Psychiatry: Patient denies any delusions, hallucinations, suicidal ideation.  Musculoskeletal: Patient denies any gait problems, joint swelling, joint pains or back pain.      Past Medical History:   Diagnosis Date   • Arthritis    • Asthma    • CHF (congestive heart failure) (CMS/[facility])    • COPD (chronic obstructive pulmonary disease) (CMS/[facility])    • Coronary artery disease    • Hypertension    • Sleep apnea      Past Surgical History:   Procedure Laterality Date   • CARDIAC CATHETERIZATION  2008   • HERNIA REPAIR       Family History   Problem Relation Age of Onset   • Hypertension Mother    • Hypertension Father    • Hypertension Sister    • Heart disease Sister      Social History     Socioeconomic History   • Marital status: Single     Spouse name: Not on file   • Number of children: Not on file   • Years of education: Not on file   • Highest education level: Not on file   Tobacco Use   • Smoking status: Former Smoker     Packs/day: 0.25     Types: Cigarettes     Last attempt to quit: 2014     Years since quittin.0   • Smokeless tobacco: Never Used   Substance and Sexual Activity   • Alcohol use: Never     Frequency: Never   • Drug use: Yes     Frequency: 7.0 times per week     Types: Marijuana     Comment: tobacco free   • Sexual activity: Yes     Partners: Female     ---------------------------------------------------------------------------------------------------------------------   Allergies:  Patient has no known  allergies.  ---------------------------------------------------------------------------------------------------------------------   Prior to Admission Medications     None        Hospital Scheduled Meds:    aspirin 81 mg Oral Daily   sodium chloride 10 mL Intravenous Q12H       nitroglycerin 5-200 mcg/min   Pharmacy to Dose enoxaparin (LOVENOX)      ---------------------------------------------------------------------------------------------------------------------   Vital Signs:  Temp:  [98.6 °F (37 °C)] 98.6 °F (37 °C)  Heart Rate:  [84-86] 86  Resp:  [18] 18  BP: (182-218)/(116-134) 218/134      08/01/20  1434   Weight: (!) 167 kg (368 lb 8 oz)     Body mass index is 54.42 kg/m².  ---------------------------------------------------------------------------------------------------------------------   Physical Exam:  Constitutional: Morbidly obese male, lying in bed, no acute distress.   HENT:  Head:  Normocephalic and atraumatic.  Mouth:  Moist mucous membranes.    Eyes:  Pupils are equal, round, and reactive to light.   Neck:  Neck supple.  No JVD present.    Cardiovascular:  Regular rate and rhythm. S1+S2. No murmur, rubs or gallops.   Lungs/Chest: Clear to auscultation bilaterally.  Patient has a port in right upper chest.  Abdomen:  Soft. Nontender. No viscera palpable.  Bowel sounds audible.   Musculoskeletal: No deformity or joint swelling.   Peripheral vascular: Bilateral dorsalis pedis palpable.  Bilateral lower extremity minimal pitting edema.  Neurological:  Alert and oriented to person, place, and time.  Cranial nerves grossly intact. Strength bilaterally symmetrical in upper and lower extremities.   Skin:  Skin is warm and dry. No rash noted. No pallor.  ---------------------------------------------------------------------------------------------------------------------  EKG: Normal sinus rhythm.  Poor R wave progression anterior  leads.    ---------------------------------------------------------------------------------------------------------------------                 Invalid input(s): PROTCrCl cannot be calculated (Patient's most recent lab result is older than the maximum 30 days allowed.).  No results found for: AMMONIA          Lab Results   Component Value Date    HGBA1C 5.0 12/21/2015     Lab Results   Component Value Date    TSH 1.061 12/21/2015    FREET4 1.05 12/21/2015     No results found for: PREGTESTUR, PREGSERUM, HCG, HCGQUANT  Pain Management Panel     Pain Management Panel Latest Ref Rng & Units 12/21/2015    AMPHETAMINES SCREEN, URINE Negative Negative    BARBITURATES SCREEN Negative Negative    BENZODIAZEPINE SCREEN, URINE Negative Negative    COCAINE SCREEN, URINE Negative Negative    METHADONE SCREEN, URINE Negative Negative        No results found for: BLOODCX  No results found for: URINECX  No results found for: WOUNDCX  No results found for: STOOLCX      ---------------------------------------------------------------------------------------------------------------------  Imaging Results (Last 7 Days)     ** No results found for the last 168 hours. **          I have personally reviewed the radiology images and read the final radiology report.  ---------------------------------------------------------------------------------------------------------------------  Assessment and Plan:    -Hypertensive urgency/emergency  Patient's blood pressure was 190 systolic when he presented here.  At the outside facility his blood pressure has been 240.  Patient states that he has history of hypertension and his blood pressure runs high.  He denies missing any of his medications.  I will continue to monitor glycerin drip and once his home meds have been reconciled, I would resume his home meds.  Aim to keep systolic blood pressure less than 180.  Will consider addition of Cardene drip if needed.    -Chest pain/NSTEMI  Per report  from physical at [facility], patient had elevated troponins.  Patient has had previously elevated troponins as well.  EKG showed no acute ST-T wave changes.  Patient has atypical features with his chest pain.  I will start patient on aspirin and continue nitroglycerin for his chest pain.  Will add beta-blocker and statin.  Cardiology is consulted.  Will obtain echocardiogram and check serial cardiac enzymes.    -CHF  Patient reports history of CHF and currently has minimal pitting edema lower extremities.  I will check a BNP level and echocardiogram to assess left ventricular function.  Patient does not appear in fluid overload at this time.  Will check a chest x-ray and further plan will based on the results of above investigation.  Continue to monitor his intake and output.    -COPD  Patient denies tobacco use but states that he usually smokes marijuana.  Currently does not appear to be in exacerbation.  I will keep patient on duo nebs.    -History of panic attacks  Patient states that he has had panic attacks for a while.  I will refill his medications when reconciled by pharmacy.  Patient may benefit from psych consultation.  Given his history of marijuana use, I will avoid any benzodiazepines at this time.    Activity: Bedrest  Nutrition: Cardiac diet, low-sodium  Fluids: None  DVT prophylaxis: Lovenox SQ  GI prophylaxis: Pepcid    I discussed the plan of care with patient and nursing staff.  Questions answered.  Patient is at high risk for decompensation due to hypertensive urgency, NSTEMI, CHF, COPD.    Jake Gustafson MD  08/01/20  15:01    Electronically signed by Jake Gustafson MD at 08/01/20 6871

## 2020-08-03 ENCOUNTER — APPOINTMENT (OUTPATIENT)
Dept: ULTRASOUND IMAGING | Facility: HOSPITAL | Age: 45
End: 2020-08-03

## 2020-08-03 PROBLEM — I21.4 NSTEMI (NON-ST ELEVATED MYOCARDIAL INFARCTION): Status: RESOLVED | Noted: 2020-08-01 | Resolved: 2020-08-03

## 2020-08-03 LAB
ALBUMIN SERPL-MCNC: 3.2 G/DL (ref 3.5–5.2)
ALBUMIN/GLOB SERPL: 1.1 G/DL
ALP SERPL-CCNC: 65 U/L (ref 39–117)
ALT SERPL W P-5'-P-CCNC: 18 U/L (ref 1–41)
ANION GAP SERPL CALCULATED.3IONS-SCNC: 9.4 MMOL/L (ref 5–15)
AST SERPL-CCNC: 16 U/L (ref 1–40)
BACTERIA UR QL AUTO: ABNORMAL /HPF
BILIRUB SERPL-MCNC: 0.2 MG/DL (ref 0–1.2)
BILIRUB UR QL STRIP: NEGATIVE
BUN SERPL-MCNC: 29 MG/DL (ref 6–20)
BUN/CREAT SERPL: 12.4 (ref 7–25)
CALCIUM SPEC-SCNC: 8.5 MG/DL (ref 8.6–10.5)
CHLORIDE SERPL-SCNC: 105 MMOL/L (ref 98–107)
CLARITY UR: CLEAR
CO2 SERPL-SCNC: 24.6 MMOL/L (ref 22–29)
COLOR UR: YELLOW
CREAT SERPL-MCNC: 2.34 MG/DL (ref 0.76–1.27)
GFR SERPL CREATININE-BSD FRML MDRD: 37 ML/MIN/1.73
GLOBULIN UR ELPH-MCNC: 2.9 GM/DL
GLUCOSE SERPL-MCNC: 99 MG/DL (ref 65–99)
GLUCOSE UR STRIP-MCNC: NEGATIVE MG/DL
HGB UR QL STRIP.AUTO: NEGATIVE
HYALINE CASTS UR QL AUTO: ABNORMAL /LPF
KETONES UR QL STRIP: NEGATIVE
LEUKOCYTE ESTERASE UR QL STRIP.AUTO: NEGATIVE
NITRITE UR QL STRIP: NEGATIVE
PH UR STRIP.AUTO: 6 [PH] (ref 5–8)
POTASSIUM SERPL-SCNC: 3.9 MMOL/L (ref 3.5–5.2)
PROT SERPL-MCNC: 6.1 G/DL (ref 6–8.5)
PROT UR QL STRIP: ABNORMAL
RBC # UR: ABNORMAL /HPF
REF LAB TEST METHOD: ABNORMAL
SODIUM SERPL-SCNC: 139 MMOL/L (ref 136–145)
SODIUM UR-SCNC: 87 MMOL/L
SP GR UR STRIP: 1.01 (ref 1–1.03)
SQUAMOUS #/AREA URNS HPF: ABNORMAL /HPF
UROBILINOGEN UR QL STRIP: ABNORMAL
WBC UR QL AUTO: ABNORMAL /HPF

## 2020-08-03 PROCEDURE — 99233 SBSQ HOSP IP/OBS HIGH 50: CPT | Performed by: HOSPITALIST

## 2020-08-03 PROCEDURE — 84156 ASSAY OF PROTEIN URINE: CPT | Performed by: HOSPITALIST

## 2020-08-03 PROCEDURE — 94799 UNLISTED PULMONARY SVC/PX: CPT

## 2020-08-03 PROCEDURE — 76775 US EXAM ABDO BACK WALL LIM: CPT

## 2020-08-03 PROCEDURE — 80053 COMPREHEN METABOLIC PANEL: CPT | Performed by: INTERNAL MEDICINE

## 2020-08-03 PROCEDURE — 84300 ASSAY OF URINE SODIUM: CPT | Performed by: HOSPITALIST

## 2020-08-03 PROCEDURE — 81001 URINALYSIS AUTO W/SCOPE: CPT | Performed by: HOSPITALIST

## 2020-08-03 PROCEDURE — 99232 SBSQ HOSP IP/OBS MODERATE 35: CPT | Performed by: NURSE PRACTITIONER

## 2020-08-03 PROCEDURE — 93010 ELECTROCARDIOGRAM REPORT: CPT | Performed by: INTERNAL MEDICINE

## 2020-08-03 PROCEDURE — 93005 ELECTROCARDIOGRAM TRACING: CPT | Performed by: INTERNAL MEDICINE

## 2020-08-03 PROCEDURE — 82570 ASSAY OF URINE CREATININE: CPT | Performed by: HOSPITALIST

## 2020-08-03 PROCEDURE — 76775 US EXAM ABDO BACK WALL LIM: CPT | Performed by: RADIOLOGY

## 2020-08-03 PROCEDURE — 25010000002 HEPARIN (PORCINE) PER 1000 UNITS: Performed by: HOSPITALIST

## 2020-08-03 RX ORDER — HEPARIN SODIUM 5000 [USP'U]/ML
5000 INJECTION, SOLUTION INTRAVENOUS; SUBCUTANEOUS EVERY 8 HOURS SCHEDULED
Status: DISCONTINUED | OUTPATIENT
Start: 2020-08-03 | End: 2020-08-04 | Stop reason: HOSPADM

## 2020-08-03 RX ORDER — AMLODIPINE BESYLATE 10 MG/1
10 TABLET ORAL
Status: DISCONTINUED | OUTPATIENT
Start: 2020-08-03 | End: 2020-08-04 | Stop reason: HOSPADM

## 2020-08-03 RX ADMIN — CLONIDINE HYDROCHLORIDE 0.2 MG: 0.2 TABLET ORAL at 19:52

## 2020-08-03 RX ADMIN — ACETAMINOPHEN 650 MG: 325 TABLET ORAL at 19:52

## 2020-08-03 RX ADMIN — LABETALOL HYDROCHLORIDE 300 MG: 100 TABLET, FILM COATED ORAL at 15:16

## 2020-08-03 RX ADMIN — SODIUM CHLORIDE, PRESERVATIVE FREE 10 ML: 5 INJECTION INTRAVENOUS at 08:23

## 2020-08-03 RX ADMIN — HYDRALAZINE HYDROCHLORIDE 75 MG: 50 TABLET, FILM COATED ORAL at 15:17

## 2020-08-03 RX ADMIN — HYDRALAZINE HYDROCHLORIDE 75 MG: 50 TABLET, FILM COATED ORAL at 08:20

## 2020-08-03 RX ADMIN — CLONIDINE HYDROCHLORIDE 0.2 MG: 0.2 TABLET ORAL at 15:16

## 2020-08-03 RX ADMIN — HYDROXYZINE HYDROCHLORIDE 25 MG: 25 TABLET, FILM COATED ORAL at 08:21

## 2020-08-03 RX ADMIN — BUPROPION HYDROCHLORIDE 200 MG: 100 TABLET, EXTENDED RELEASE ORAL at 08:19

## 2020-08-03 RX ADMIN — HEPARIN SODIUM 5000 UNITS: 5000 INJECTION INTRAVENOUS; SUBCUTANEOUS at 15:38

## 2020-08-03 RX ADMIN — HYDROXYZINE HYDROCHLORIDE 25 MG: 25 TABLET, FILM COATED ORAL at 19:53

## 2020-08-03 RX ADMIN — LABETALOL HYDROCHLORIDE 300 MG: 100 TABLET, FILM COATED ORAL at 19:52

## 2020-08-03 RX ADMIN — IPRATROPIUM BROMIDE AND ALBUTEROL SULFATE 3 ML: .5; 3 SOLUTION RESPIRATORY (INHALATION) at 12:57

## 2020-08-03 RX ADMIN — SODIUM CHLORIDE, PRESERVATIVE FREE 10 ML: 5 INJECTION INTRAVENOUS at 08:22

## 2020-08-03 RX ADMIN — IPRATROPIUM BROMIDE AND ALBUTEROL SULFATE 3 ML: .5; 3 SOLUTION RESPIRATORY (INHALATION) at 07:00

## 2020-08-03 RX ADMIN — ATORVASTATIN CALCIUM 40 MG: 40 TABLET, FILM COATED ORAL at 19:53

## 2020-08-03 RX ADMIN — ACETAMINOPHEN 650 MG: 325 TABLET ORAL at 10:57

## 2020-08-03 RX ADMIN — IPRATROPIUM BROMIDE AND ALBUTEROL SULFATE 3 ML: .5; 3 SOLUTION RESPIRATORY (INHALATION) at 18:28

## 2020-08-03 RX ADMIN — CLONIDINE HYDROCHLORIDE 0.2 MG: 0.2 TABLET ORAL at 08:20

## 2020-08-03 RX ADMIN — MONTELUKAST SODIUM 10 MG: 10 TABLET, COATED ORAL at 19:53

## 2020-08-03 RX ADMIN — TAMSULOSIN HYDROCHLORIDE 0.4 MG: 0.4 CAPSULE ORAL at 19:53

## 2020-08-03 RX ADMIN — HYDRALAZINE HYDROCHLORIDE 75 MG: 50 TABLET, FILM COATED ORAL at 19:53

## 2020-08-03 RX ADMIN — AMLODIPINE BESYLATE 10 MG: 10 TABLET ORAL at 09:42

## 2020-08-03 RX ADMIN — SODIUM CHLORIDE 5 MG/HR: 9 INJECTION, SOLUTION INTRAVENOUS at 02:29

## 2020-08-03 RX ADMIN — LABETALOL HYDROCHLORIDE 300 MG: 100 TABLET, FILM COATED ORAL at 08:19

## 2020-08-03 RX ADMIN — HEPARIN SODIUM 5000 UNITS: 5000 INJECTION INTRAVENOUS; SUBCUTANEOUS at 19:52

## 2020-08-03 RX ADMIN — ASPIRIN 81 MG: 81 TABLET, COATED ORAL at 08:19

## 2020-08-03 RX ADMIN — ISOSORBIDE MONONITRATE 120 MG: 60 TABLET ORAL at 08:20

## 2020-08-03 NOTE — PROGRESS NOTES
[facility] HOSPITALIST PROGRESS NOTE     Patient Identification:  Name:  Sudarshan Keene  Age:  45 y.o.  Sex:  male  :  1975  MRN:  [id number]  Visit Number:  [id number]  ROOM: [address] [place] [address]     Primary Care Provider:  Provider, No Known    Length of stay:  2    Subjective        Chief Compliant Follow up for the HTN    Patient seen and examined this afternoon with PHU Minor at the bedside. Doing better. Denies any chest pain, shortness of breath at rest and cough. Denies Nausea,vomiting and the abdominal pain. Afebrile and no events overnight. On 2 L NC, weaned down to RA. Off of the Nicardipine gtt this morning.   BP much improved.     Objective     Current Hospital Meds:    amLODIPine 10 mg Oral Q24H   aspirin 81 mg Oral Daily   atorvastatin 40 mg Oral Nightly   buPROPion  mg Oral Daily   cloNIDine 0.2 mg Oral TID   heparin (porcine) 5,000 Units Subcutaneous Q8H   hydrALAZINE 75 mg Oral Q8H   ipratropium-albuterol 3 mL Nebulization 4x Daily - RT   isosorbide mononitrate 120 mg Oral Daily   labetalol 300 mg Oral TID   montelukast 10 mg Oral Nightly   sodium chloride 10 mL Intravenous Q12H   sodium chloride 10 mL Intravenous Q12H   sodium chloride 10 mL Intravenous Q12H   sodium chloride 10 mL Intravenous Q12H   sodium chloride 10 mL Intravenous Q12H   tamsulosin 0.4 mg Oral Nightly      ----------------------------------------------------------------------------------------------------------------------  Vital Signs:  Temp:  [97.9 °F (36.6 °C)-98.8 °F (37.1 °C)] 97.9 °F (36.6 °C)  Heart Rate:  [62-83] 71  Resp:  [10-26] 18  BP: (126-176)/() 145/83  SpO2:  [93 %-100 %] 99 %  on  Flow (L/min):  [2] 2;   Device (Oxygen Therapy): room air  Body mass index is 54.34 kg/m².    Wt Readings from Last 3 Encounters:   20 (!) 167 kg (368 lb)       Intake/Output Summary (Last 24 hours) at 8/3/2020 1523  Last data filed at 8/3/2020 1259  Gross per 24 hour   Intake 2990.55 ml   Output 2150 ml    Net 840.55 ml     Diet Regular; Cardiac, Low Sodium  ----------------------------------------------------------------------------------------------------------------------  Physical exam:  General: Comfortable, Not in distress.  Well-developed and well-nourished.   HENT:  Head:  Normocephalic and atraumatic.  Mouth:  Moist mucous membranes.    Eyes:  Conjunctivae and EOM are normal.  Pupils are equal, round, and reactive to light.  No scleral icterus.    Neck:  Neck supple.  No JVD present.  trachea midline.   Cardiovascular:  Normal rate, regular rhythm with no murmur.  Pulmonary/Chest:  No respiratory distress, no wheezes, no crackles, with coarse breath sounds and good air movement.  Abdomen:  Soft.  Bowel sounds are normal.  No distension and no tenderness.   Musculoskeletal:  no tenderness, and no deformity.  No red or swollen joints anywhere.    Neurological:  Alert and oriented to person, place, and time.  No cranial nerve deficit. No focal deficits. No facial droop.  No slurred speech.   Skin:  Skin is warm and dry. No rash noted. No pallor.   Peripheral vascular: pulses in all 4 extremities with no clubbing, no cyanosis, no edema.  Genitourinary: no fajardo  ----------------------------------------------------------------------------------------------------------------------  ----------------------------------------------------------------------------------------------------------------------  Results from last 7 days   Lab Units 08/02/20  1616 08/01/20  1529 08/01/20  1528   CRP mg/dL  --   --  1.54*   WBC 10*3/mm3 5.45  --  5.77   HEMOGLOBIN g/dL 11.7*  --  12.7*   HEMATOCRIT % 38.2  --  41.3   MCV fL 83.0  --  84.6   MCHC g/dL 30.6*  --  30.8*   PLATELETS 10*3/mm3 183  --  190   INR   --  0.91  --      Results from last 7 days   Lab Units 08/03/20  0046 08/01/20  1909 08/01/20  1528   SODIUM mmol/L 139 143 141   POTASSIUM mmol/L 3.9 3.8 4.0   MAGNESIUM mg/dL  --   --  2.0   CHLORIDE mmol/L 105 105 103    CO2 mmol/L 24.6 15.9* 24.7   BUN mg/dL 29* 35* 37*   CREATININE mg/dL 2.34* 2.36* 2.41*   EGFR IF AFRICN AM mL/min/1.73 37* 36* 35*   CALCIUM mg/dL 8.5* 8.8 8.6   GLUCOSE mg/dL 99 103* 80   ALBUMIN g/dL 3.20* 3.54 3.40*   BILIRUBIN mg/dL 0.2 0.2 0.2   ALK PHOS U/L 65 76 72   AST (SGOT) U/L 16 23 21   ALT (SGPT) U/L 18 24 22   Estimated Creatinine Clearance: 61.5 mL/min (A) (by C-G formula based on SCr of 2.34 mg/dL (H)).  Results from last 7 days   Lab Units 08/02/20  1616 08/01/20  1909 08/01/20  1528   CK TOTAL U/L  --   --  315*   CKMB ng/mL  --   --  8.78   TROPONIN T ng/mL 0.047* 0.049* 0.059*     Hemoglobin A1C   Date/Time Value Ref Range Status   08/01/2020 1528 5.60 4.80 - 5.60 % Final     No results found for: AMMONIA  Results from last 7 days   Lab Units 08/01/20  1528   CHOLESTEROL mg/dL 153   TRIGLYCERIDES mg/dL 65   HDL CHOL mg/dL 54   LDL CHOL mg/dL 86     Results from last 7 days   Lab Units 08/03/20  0943   NITRITE UA  Negative   WBC UA /HPF None Seen   BACTERIA UA /HPF Trace*   SQUAM EPITHEL UA /HPF 0-2     No results found for: BLOODCXNo results found for: RESPCXNo results found for: URINECXNo results found for: WOUNDCXNo results found for: BODYFLDCXNo results found for: STOOLCX  I have personally looked at the labs and they are summarized above.  ----------------------------------------------------------------------------------------------------------------------  Imaging Results (Last 24 Hours)     Procedure Component Value Units Date/Time    US Renal Bilateral [[id number]] Collected:  08/03/20 1430     Updated:  08/03/20 1449    Narrative:       EXAMINATION: US RENAL BILATERAL-      CLINICAL INDICATION: CKD        COMPARISON: None available     PROCEDURE: Sonographic imaging of the kidneys     FINDINGS:  Imaging of the right kidney demonstrates the right kidney measuring  11.42 x 5.62 x 5.58 cm. No solid mass or hydronephrosis.     Left kidney measures 12.75 x 5.89 x 4.21 cm. No solid mass  or  hydronephrosis       Impression:       1. Unremarkable sonographic appearance of the kidneys.              I have personally reviewed the radiology images and read the final radiology report.    Assessment & Plan      Assessment:  HTN emergency  Hypertensive heart disease  Elevated troponin  CKD stage 3  Elevated D-dimer with low probability VQ scan  COPD  KATHLEEN  Adjustment disorder with history of panic attacks  Substance abuse with + UDS for THC  Morbid Obesity  Medical Noncompliance    Plan:  HTN emergency, resolved. Off of the NTG gtt on 08/01. Off of the Nicardipine gtt this morning. Will increase dose of amlodipine and hydralazine. On clonidine, labetalol. Monitor and adjust accordingly. Hold home losartan and Bumex.     Hypertensive heart disease, optimize HTN control.     Elevated troponin, cardiology on board. On ASA and lipitor. Records from [facility] requested. Cardiology advised OP F/U.    CKD stage 3, Cr stable. Consult nephrology for evaluation since h/o non compliance. Will check UA, urine sodium, renal US and Protein Cr ratio. UA with mild proteinuria. Urine sodium is high. Renal US normal.     COPD, stable. On singular/Duo-nebs.     KATHLEEN, uses CPAP at home.     Adjustment disorder with history of panic attacks, on Wellbutrin. Would like his Xanax resumed.  DCed by my colleague Dr. Gustafson for no prescription in the last 30 days.   Will hold.     Medical Noncompliance, advised regarding diet and the medication compliance.    Start on heparin SC for the DVT prophylaxis.   Transfer to telemetry.   Advised to ambulate.    DC to home in 24-48 hrs if stable.    Management and the plans discussed in detail with the patient and Nursing.       The patient is high risk due to the following diagnoses/reasons: HTN emergency, Hypertensive heart disease, Elevated troponin, CKD stage 3      Natalie Gibbons MD  08/03/20  15:23

## 2020-08-03 NOTE — CONSULTS
Nephrology Consult Note    Referring Provider: Ayesha Herrera  Reason for Consultation: Gloria    Subjective       History of present illness:  Sudarshan Keene is a 45 y.o. male who presented to [facility] emergency department with chief complaint of chest pain and was admitted with hypertensive urgency. Nephrology has asked to evaluated for elevated Cr.   Pt does not have history of CKD, has long standing uncontrolled hypertension with strong family history of essential hypertension. Pt has history of CAD, COPD and sleep anpnea and has history of medical non compliance on chart review. Pt denied any nausea, vomiting or sore throat or any new medication changes. He also denied any rash he noticed recently  There was no history of Chronic NSAIDS use. Patient denies hematuria, dysuria, difficulty passing urine. No prior history of renal stones. No family history of renal disease    History  Past Medical History:   Diagnosis Date   • Arthritis    • Asthma    • CHF (congestive heart failure) (CMS/[facility])    • COPD (chronic obstructive pulmonary disease) (CMS/[facility])    • Coronary artery disease    • Hypertension    • Sleep apnea    , Past Surgical History:   Procedure Laterality Date   • CARDIAC CATHETERIZATION  2008   • HERNIA REPAIR     , Family History   Problem Relation Age of Onset   • Hypertension Mother    • Hypertension Father    • Hypertension Sister    • Heart disease Sister    , Social History     Tobacco Use   • Smoking status: Former Smoker     Packs/day: 0.25     Types: Cigarettes     Last attempt to quit: 2014     Years since quittin.0   • Smokeless tobacco: Never Used   Substance Use Topics   • Alcohol use: Never     Frequency: Never   • Drug use: Yes     Frequency: 7.0 times per week     Types: Marijuana     Comment: tobacco free   , Medications Prior to Admission   Medication Sig Dispense Refill Last Dose   • albuterol (PROVENTIL) (2.5 MG/3ML) 0.083% nebulizer solution Take 2.5 mg  by nebulization Every 4 (Four) Hours As Needed for Wheezing.   Past Week at Unknown time   • albuterol sulfate  (90 Base) MCG/ACT inhaler Inhale 2 puffs Every 4 (Four) Hours As Needed for Wheezing.   Past Week at Unknown time   • ALPRAZolam (XANAX) 1 MG tablet Take 1 mg by mouth 2 (Two) Times a Day As Needed for Anxiety.   7/31/2020 at pm   • aspirin 81 MG EC tablet Take 81 mg by mouth Daily.   7/31/2020 at am   • atorvastatin (LIPITOR) 40 MG tablet Take 40 mg by mouth Every Night.   7/31/2020 at pm   • bumetanide (BUMEX) 1 MG tablet Take 1 mg by mouth Daily.   7/31/2020 at am   • buPROPion SR (WELLBUTRIN SR) 100 MG 12 hr tablet Take 200 mg by mouth Daily.   7/31/2020 at am   • cloNIDine (CATAPRES) 0.2 MG tablet Take 0.2 mg by mouth 3 (Three) Times a Day.   7/31/2020 at pm   • hydrALAZINE (APRESOLINE) 50 MG tablet Take 50 mg by mouth 3 (Three) Times a Day.   7/31/2020 at pm   • isosorbide mononitrate (IMDUR) 120 MG 24 hr tablet Take 120 mg by mouth Daily.   7/31/2020 at am   • labetalol (NORMODYNE) 300 MG tablet Take 300 mg by mouth 3 (Three) Times a Day.   7/31/2020 at pm   • losartan (COZAAR) 25 MG tablet Take 25 mg by mouth Daily.   7/31/2020 at am   • montelukast (SINGULAIR) 10 MG tablet Take 10 mg by mouth Every Night.   7/31/2020 at pm   • tamsulosin (FLOMAX) 0.4 MG capsule 24 hr capsule Take 1 capsule by mouth Every Night.   7/31/2020 at pm   • tiotropium (SPIRIVA) 18 MCG per inhalation capsule Place 1 capsule into inhaler and inhale Daily.   7/31/2020 at am   , Scheduled Meds:    amLODIPine 10 mg Oral Q24H   aspirin 81 mg Oral Daily   atorvastatin 40 mg Oral Nightly   buPROPion  mg Oral Daily   cloNIDine 0.2 mg Oral TID   hydrALAZINE 75 mg Oral Q8H   ipratropium-albuterol 3 mL Nebulization 4x Daily - RT   isosorbide mononitrate 120 mg Oral Daily   labetalol 300 mg Oral TID   montelukast 10 mg Oral Nightly   sodium chloride 10 mL Intravenous Q12H   sodium chloride 10 mL Intravenous Q12H   sodium  chloride 10 mL Intravenous Q12H   sodium chloride 10 mL Intravenous Q12H   sodium chloride 10 mL Intravenous Q12H   tamsulosin 0.4 mg Oral Nightly   , Continuous Infusions:   , PRN Meds:  •  acetaminophen **OR** acetaminophen **OR** acetaminophen  •  aluminum-magnesium hydroxide-simethicone  •  bisacodyl  •  bisacodyl  •  docusate sodium  •  hydrOXYzine  •  magnesium hydroxide  •  ondansetron **OR** ondansetron  •  sodium chloride  •  sodium chloride  •  sodium chloride  •  sodium chloride and Allergies:  Patient has no known allergies.    Review of Systems  More than 10 point review of systems was done. Pertinent items are noted in HPI, all other systems reviewed and negative    Objective     Vital Signs  Temp:  [98 °F (36.7 °C)-98.8 °F (37.1 °C)] 98.3 °F (36.8 °C)  Heart Rate:  [62-83] 72  Resp:  [10-22] 19  BP: (126-176)/() 146/79    I/O this shift:  In: 805.4 [P.O.:708; I.V.:97.4]  Out: 1450 [Urine:1450]  I/O last 3 completed shifts:  In: 4204.3 [P.O.:1036; I.V.:3168.3]  Out: 4050 [Urine:4050]    Physical Examination:  General Appearance: Not in acute distress  Head: Normocephalic, without obvious abnormality and atraumatic  Eyes: Conjunctivae and sclerae normal, no icterus, no pallor and PERRLA  Throat: Oral mucosa moist  Neck: No adenopathy, suppple, no carotid bruit and No JVD  Lungs: Clear to auscultation, respirations regular and unlabored  Heart: Regular rhythm & normal rate, normal S1, S2, no murmur, no gallop, no rub   Abdomen: Normal bowel sounds, no masses and soft non-tender  Rectal: Deferred  Extremities: Moves extremities well, no redness and No edema  Pulses: Palpable and equal bilaterally  Skin: No bleeding, bruising or rash  Neurologic: Orientated to person, place, time, grossly no focal deficitis    Laboratory Data :      WBC WBC   Date Value Ref Range Status   08/02/2020 5.45 3.40 - 10.80 10*3/mm3 Final   08/01/2020 5.77 3.40 - 10.80 10*3/mm3 Final      HGB Hemoglobin   Date Value Ref  Range Status   08/02/2020 11.7 (L) 13.0 - 17.7 g/dL Final   08/01/2020 12.7 (L) 13.0 - 17.7 g/dL Final      HCT Hematocrit   Date Value Ref Range Status   08/02/2020 38.2 37.5 - 51.0 % Final   08/01/2020 41.3 37.5 - 51.0 % Final      Platlets No results found for: LABPLAT   MCV MCV   Date Value Ref Range Status   08/02/2020 83.0 79.0 - 97.0 fL Final   08/01/2020 84.6 79.0 - 97.0 fL Final          Sodium Sodium   Date Value Ref Range Status   08/03/2020 139 136 - 145 mmol/L Final   08/01/2020 143 136 - 145 mmol/L Final   08/01/2020 141 136 - 145 mmol/L Final      Potassium Potassium   Date Value Ref Range Status   08/03/2020 3.9 3.5 - 5.2 mmol/L Final   08/01/2020 3.8 3.5 - 5.2 mmol/L Final   08/01/2020 4.0 3.5 - 5.2 mmol/L Final      Chloride Chloride   Date Value Ref Range Status   08/03/2020 105 98 - 107 mmol/L Final   08/01/2020 105 98 - 107 mmol/L Final   08/01/2020 103 98 - 107 mmol/L Final      CO2 CO2   Date Value Ref Range Status   08/03/2020 24.6 22.0 - 29.0 mmol/L Final   08/01/2020 15.9 (L) 22.0 - 29.0 mmol/L Final   08/01/2020 24.7 22.0 - 29.0 mmol/L Final      BUN BUN   Date Value Ref Range Status   08/03/2020 29 (H) 6 - 20 mg/dL Final   08/01/2020 35 (H) 6 - 20 mg/dL Final   08/01/2020 37 (H) 6 - 20 mg/dL Final      Creatinine Creatinine   Date Value Ref Range Status   08/03/2020 2.34 (H) 0.76 - 1.27 mg/dL Final   08/01/2020 2.36 (H) 0.76 - 1.27 mg/dL Final   08/01/2020 2.41 (H) 0.76 - 1.27 mg/dL Final      Calcium Calcium   Date Value Ref Range Status   08/03/2020 8.5 (L) 8.6 - 10.5 mg/dL Final   08/01/2020 8.8 8.6 - 10.5 mg/dL Final   08/01/2020 8.6 8.6 - 10.5 mg/dL Final      PO4 No results found for: CAPO4   Albumin Albumin   Date Value Ref Range Status   08/03/2020 3.20 (L) 3.50 - 5.20 g/dL Final   08/01/2020 3.54 3.50 - 5.20 g/dL Final   08/01/2020 3.40 (L) 3.50 - 5.20 g/dL Final      Magnesium Magnesium   Date Value Ref Range Status   08/01/2020 2.0 1.6 - 2.6 mg/dL Final      Uric Acid No  results found for: URICACID     Radiology results :     Imaging Results (Last 72 Hours)     Procedure Component Value Units Date/Time    XR Chest AP [[id number]] Collected:  08/02/20 1348     Updated:  08/02/20 1352    Narrative:       EXAMINATION: XR CHEST AP-      CLINICAL INDICATION:     line placement     TECHNIQUE: Single AP view of chest.      COMPARISON: 08/01/2020      FINDINGS:   There is bibasilar atelectasis.   Lungs are otherwise aerated.  A left central line is been placed with the tip in the SVC.   Heart size remains enlarged.  No pneumothorax.           Impression:       As above.     This report was finalized on 8/2/2020 1:50 PM by Dr. Stu Simpson MD.       NM Lung Scan Perfusion Particulate [[id number]] Collected:  08/01/20 2000     Updated:  08/01/20 2002    Narrative:       NM Pulm Perf Imaging    HISTORY:  45-year-old male with chest pain, shortness of breath, and elevated d-dimer today.    DOSE:  *  4 mCi Tc99m MAA.    COMPARISON:   Correlation made to chest x-ray 8/1/2020.    FINDINGS:   Perfusion only imaging:  There is uniform distribution of radiotracer throughout both lungs.       Impression:       No focal perfusion defects. Low probability for pulmonary vascular occlusive disease.    Signer Name: Isidoro Aldrich MD   Signed: 8/1/2020 8:00 PM   Workstation Name: [email or web address]-    [facility]    US Venous Doppler Lower Extremity Bilateral (duplex) [[id number]] Collected:  08/01/20 1709     Updated:  08/01/20 1711    Narrative:       PROCEDURE: US Veins LE Duplex BILAT    COMPARISON: No comparison study or studies for correlation purposes available at time of dictation.     INDICATIONS: rule out DVT, elevated d-dimer patient has a 2 year history of swelling in both lower extremities    FINDINGS:  Gray-scale, color flow and Duplex spectral waveform analysis and interrogation of bilateral lower extremity venous structures is performed with augmentation if possible..    Common femoral, deep femoral, superficial femoral, popliteal, peroneal and posterior tibial veins interrogated by department protocol.    These interrogated vessels demonstrate normal compressibility, color flow and augmentation upon distal calf compression. There is no evidence of deep venous thrombosis in either leg.      Impression:       No evidence of DVT in either lower extremity.    Signer Name: Ailyn Shea MD   Signed: 8/1/2020 5:09 PM   Workstation Name: [facility]-    [facility]    XR Chest 1 View [[id number]] Collected:  08/01/20 1554     Updated:  08/01/20 1612    Narrative:       EXAMINATION: XR CHEST 1 VW-      CLINICAL INDICATION:     chest pain, port position assessment     TECHNIQUE: Single AP view of chest.      COMPARISON: EXAMINATION: XR CHEST 1 VW-      CLINICAL INDICATION:     chest pain, port position assessment     TECHNIQUE:  XR CHEST 1 VW-      COMPARISON: NONE      FINDINGS:   The lungs remain aerated.  Heart and mediastinum contours are unremarkable.  No pleural effusion.  No pneumothorax.   Bony and soft tissue structures are unremarkable.       Impression:       No radiographic evidence of acute cardiac or pulmonary  disease.        FINDINGS: Right port with tip in SVC.  There is bibasilar atelectasis.   Lungs are otherwise aerated.  Support tube and lines are in unchanged position.   Heart size is stable.  No pneumothorax.   Tiny bilateral pleural effusions persist.      IMPRESSION: Stable appearance of the chest.     This report was finalized on 8/1/2020 3:54 PM by Dr. Stu Simpson MD.               Medications:        amLODIPine 10 mg Oral Q24H   aspirin 81 mg Oral Daily   atorvastatin 40 mg Oral Nightly   buPROPion  mg Oral Daily   cloNIDine 0.2 mg Oral TID   hydrALAZINE 75 mg Oral Q8H   ipratropium-albuterol 3 mL Nebulization 4x Daily - RT   isosorbide mononitrate 120 mg Oral Daily   labetalol 300 mg Oral TID   montelukast 10 mg Oral Nightly    sodium chloride 10 mL Intravenous Q12H   sodium chloride 10 mL Intravenous Q12H   sodium chloride 10 mL Intravenous Q12H   sodium chloride 10 mL Intravenous Q12H   sodium chloride 10 mL Intravenous Q12H   tamsulosin 0.4 mg Oral Nightly          Assessment/Plan       * No active hospital problems. *    1. Hypertensive urgency  2. CKD  3. COPD  4. Hypertensive heart disease    CKD G3bA1 likely 2/2 hypertensive ischemic nephrosclerosis    Unknown baseline Cr, admitted with 2.4 ->2.3  No hematuria, 100mg/dl proteinuria  Sono unremarkable  Nneka is elevated likely daily intake of sodium is very high and likely underlying etiology for uncontrolled hypertension  -low salt diet  -send Urine protein Cr. Ratio  -pt needs out patient follow up 4 wks after discharge    Thanks Dr Gibbons for the consult. Nephrology will follow the patient.   I discussed the patient's findings and my recommendations with nursing staff.     Jake Lala MD  08/03/20  13:28

## 2020-08-03 NOTE — PLAN OF CARE
Problem: Patient Care Overview  Goal: Plan of Care Review  Outcome: Ongoing (interventions implemented as appropriate)  Flowsheets (Taken 8/3/2020 0356)  Progress: no change  Plan of Care Reviewed With: patient  Outcome Summary: Cardene gtt titrated as needed. VSS, hypertension improved. Reported incisional pain at central line site, earlier in shift. requested prn anxiety medication. Afebrile, with adequate UOP. Continue to monitor.     Problem: Pain, Chronic (Adult)  Goal: Identify Related Risk Factors and Signs and Symptoms  Outcome: Ongoing (interventions implemented as appropriate)     Problem: Pain, Chronic (Adult)  Goal: Acceptable Pain/Comfort Level and Functional Ability  Outcome: Ongoing (interventions implemented as appropriate)  Flowsheets (Taken 8/3/2020 0356)  Acceptable Pain/Comfort Level and Functional Ability: making progress toward outcome     Problem: Fall Risk (Adult)  Goal: Identify Related Risk Factors and Signs and Symptoms  Outcome: Ongoing (interventions implemented as appropriate)     Problem: Fall Risk (Adult)  Goal: Absence of Fall  Outcome: Ongoing (interventions implemented as appropriate)  Flowsheets (Taken 8/3/2020 0356)  Absence of Fall: achieves outcome     Problem: Skin Injury Risk (Adult)  Goal: Identify Related Risk Factors and Signs and Symptoms  Outcome: Ongoing (interventions implemented as appropriate)     Problem: Skin Injury Risk (Adult)  Goal: Skin Health and Integrity  Outcome: Ongoing (interventions implemented as appropriate)  Flowsheets (Taken 8/3/2020 0356)  Skin Health and Integrity: making progress toward outcome

## 2020-08-03 NOTE — NURSING NOTE
Patient transferred per transport x 2 via bariatric bed.  All personal belongings sent with patient.  Medications sent per transport.

## 2020-08-03 NOTE — NURSING NOTE
Progressive Mobility    Date: 08/03/20     Mobility Initiation Contradictions: None    Day of Mobility 2 Activity Performed: AROM, stood at bedside    Patient: tolerated    Assisted by 1 person/persons    Device(s) used: none     Sharon Crisostomo RN   08/03/20

## 2020-08-03 NOTE — PLAN OF CARE
Problem: Patient Care Overview  Goal: Plan of Care Review  Outcome: Ongoing (interventions implemented as appropriate)  Flowsheets  Taken 8/3/2020 1407  Progress: improving  Outcome Summary: Patient has been off cardene gtt since this morning and has done well.  Patient's antihypertensive medications have been adjusted today.  Patient denies having any chest pain.  No fevers present.  Patient will be transferred to [address] [place] [address] this afternoon.  Plan to go home tomorrow if feasible per MD.  Taken 8/3/2020 0830  Plan of Care Reviewed With: patient  Goal: Individualization and Mutuality  Outcome: Ongoing (interventions implemented as appropriate)  Goal: Discharge Needs Assessment  Outcome: Ongoing (interventions implemented as appropriate)  Flowsheets (Taken 8/3/2020 1407)  Concerns to be Addressed: no discharge needs identified  Readmission Within the Last 30 Days: no previous admission in last 30 days  Goal: Interprofessional Rounds/Family Conf  Outcome: Ongoing (interventions implemented as appropriate)     Problem: Pain, Chronic (Adult)  Goal: Identify Related Risk Factors and Signs and Symptoms  Outcome: Ongoing (interventions implemented as appropriate)  Flowsheets (Taken 8/1/2020 1515 by Katerina Keen, RN)  Related Risk Factors (Chronic Pain): disease process;physical disability;prolonged healing  Signs and Symptoms (Chronic Pain): fatigue/weakness;verbalization of pain descriptors;verbalization of pain/discomfort for a prolonged time period  Goal: Acceptable Pain/Comfort Level and Functional Ability  Outcome: Ongoing (interventions implemented as appropriate)  Flowsheets (Taken 8/3/2020 0356 by Sharon Crisostomo, RN)  Acceptable Pain/Comfort Level and Functional Ability: making progress toward outcome     Problem: Cardiac: ACS (Acute Coronary Syndrome) (Adult)  Goal: Signs and Symptoms of Listed Potential Problems Will be Absent, Minimized or Managed (Cardiac: ACS)  Outcome: Ongoing (interventions  implemented as appropriate)  Flowsheets  Taken 8/3/2020 0356 by Sharon Crisostomo RN  Problems Assessed (Acute Coronary Syndrome): all  Taken 8/1/2020 1515 by Katerina Keen RN  Problems Present (Acute Coronary Syn): chest pain (angina);situational response     Problem: Fall Risk (Adult)  Goal: Identify Related Risk Factors and Signs and Symptoms  Outcome: Ongoing (interventions implemented as appropriate)  Flowsheets (Taken 8/1/2020 1515 by Katerina Keen RN)  Related Risk Factors (Fall Risk): depression/anxiety;fatigue/slow reaction;history of falls  Goal: Absence of Fall  Outcome: Ongoing (interventions implemented as appropriate)  Flowsheets (Taken 8/3/2020 1407)  Absence of Fall: achieves outcome     Problem: Diabetes, Type 2 (Adult)  Goal: Signs and Symptoms of Listed Potential Problems Will be Absent, Minimized or Managed (Diabetes, Type 2)  Outcome: Ongoing (interventions implemented as appropriate)  Flowsheets  Taken 8/2/2020 0330 by Sharon Crisostomo RN  Problems Assessed (Type 2 Diabetes): all  Taken 8/1/2020 1515 by Katerina Keen RN  Problems Present (Type 2 Diabetes): situational response     Problem: Skin Injury Risk (Adult)  Goal: Identify Related Risk Factors and Signs and Symptoms  Outcome: Ongoing (interventions implemented as appropriate)  Flowsheets (Taken 8/3/2020 1407)  Related Risk Factors (Skin Injury Risk): body weight extremes; critical care admission  Goal: Skin Health and Integrity  Outcome: Ongoing (interventions implemented as appropriate)  Flowsheets (Taken 8/3/2020 0356 by Sharon Crisostomo RN)  Skin Health and Integrity: making progress toward outcome

## 2020-08-03 NOTE — PROGRESS NOTES
LOS: 2 days     Name: Sudarshan Keene  Age/Sex: 45 y.o. male  :  1975        PCP: Provider, No Known    Active Problems:    * No active hospital problems. *      Admission Information: Sudarshan Keene is a 45 y.o. male with a past medical history significant for diastolic heart failure, chronic kidney disease stage III, essential hypertension, and dyslipidemia.  He presented to [facility] transfer from [facility] due to chest pain with elevated troponin.  Cardiology was consulted due to chest pain with elevated troponin.    Chief Complaint: Follow-up hypertension    Interval history: Patient was seen and examined.  He states that he is back to baseline.  He denies any chest pain or shortness of breath.    Subjective     Vital Signs  Vital Signs (last 72 hrs)        07  -   0659  07  -   0659  07  -   0659 700  -   1446   Most Recent    Temp (°F)   97.9 -  98.9    98 -  98.8    97.9 -  98.3     97.9 (36.6)    Heart Rate   70 -  101    62 -  93    64 -  82     71    Resp    -  26    10 -  22    10 -  26     18    BP   120/68 -  245/163    126/85 -  171/105    133/86 -  176/111     145/83    SpO2 (%)   80 -  100    93 -  100    95 -  100     99        Temp:  [97.9 °F (36.6 °C)-98.8 °F (37.1 °C)] 97.9 °F (36.6 °C)  Heart Rate:  [62-83] 71  Resp:  [10-26] 18  BP: (126-176)/() 145/83  Body mass index is 54.34 kg/m².      Intake/Output Summary (Last 24 hours) at 8/3/2020 1446  Last data filed at 8/3/2020 1259  Gross per 24 hour   Intake 2990.55 ml   Output 2150 ml   Net 840.55 ml       Physical Exam   Constitutional: He is oriented to person, place, and time. He appears well-developed and well-nourished.   Neck: No JVD present. Carotid bruit is not present.   Cardiovascular: Normal rate and regular rhythm.   No lower extremity edema   Pulmonary/Chest: Effort normal and breath sounds normal. No respiratory distress. He has no wheezes. He has  no rales.   Neurological: He is alert and oriented to person, place, and time.   Skin: Skin is warm and dry.   Psychiatric: He has a normal mood and affect. Cognition and memory are normal.   Vitals reviewed.      Telemetry:  Sinus 60s       Results Review:     Results from last 7 days   Lab Units 08/02/20  1616 08/01/20  1528   WBC 10*3/mm3 5.45 5.77   HEMOGLOBIN g/dL 11.7* 12.7*   PLATELETS 10*3/mm3 183 190     Results from last 7 days   Lab Units 08/03/20  0046 08/01/20  1909 08/01/20  1528   SODIUM mmol/L 139 143 141   POTASSIUM mmol/L 3.9 3.8 4.0   CHLORIDE mmol/L 105 105 103   CO2 mmol/L 24.6 15.9* 24.7   BUN mg/dL 29* 35* 37*   CREATININE mg/dL 2.34* 2.36* 2.41*   CALCIUM mg/dL 8.5* 8.8 8.6   GLUCOSE mg/dL 99 103* 80     Results from last 7 days   Lab Units 08/02/20  1616 08/01/20  1909 08/01/20  1528   CK TOTAL U/L  --   --  315*   TROPONIN T ng/mL 0.047* 0.049* 0.059*   CKMB ng/mL  --   --  8.78       Results from last 7 days   Lab Units 08/01/20  1529   INR  0.91       I reviewed the patient's new clinical results.  I reviewed the patient's new imaging results and agree with the interpretation.  I personally viewed and interpreted the patient's EKG/Telemetry data      Medication Review:     amLODIPine 10 mg Oral Q24H   aspirin 81 mg Oral Daily   atorvastatin 40 mg Oral Nightly   buPROPion  mg Oral Daily   cloNIDine 0.2 mg Oral TID   hydrALAZINE 75 mg Oral Q8H   ipratropium-albuterol 3 mL Nebulization 4x Daily - RT   isosorbide mononitrate 120 mg Oral Daily   labetalol 300 mg Oral TID   montelukast 10 mg Oral Nightly   sodium chloride 10 mL Intravenous Q12H   sodium chloride 10 mL Intravenous Q12H   sodium chloride 10 mL Intravenous Q12H   sodium chloride 10 mL Intravenous Q12H   sodium chloride 10 mL Intravenous Q12H   tamsulosin 0.4 mg Oral Nightly          Assessment:  1. Hypertension emergency, now improved with history of medical noncompliance and recurrent issues with uncontrolled blood  pressure  2. Hypertensive heart disease with echocardiogram showing severe LVH and no significant obstruction  3. Chronic kidney disease stage III  4. Elevated troponin likely from uncontrolled hypertension and chronic kidney disease.  Troponins trending down and patient chest pain-free.  Patient has had multiple evaluations in the past at different facilities.  He had a cardiac catheterization at [facility] and per patient he did not have any coronary artery disease.        Recommendations:  1. Recommend continued escalatation of antihypertension medications as tolerated.  2. Medication compliance discussed with the patient.  He will need outpatient follow-up in 2 to 4 weeks after discharge.    I discussed the patients findings and my recommendations with patient and family    ALEX Langford acting as scribe for Dagoberto Warren M.D., [facility], [facility]    ALEX Rincon  08/03/20  2:46 PM    Addendum:

## 2020-08-03 NOTE — NURSING NOTE
Patient has not complained of any chest pain or SOA since arriving to the floor.  He has a very good appetite and keeps asking for food.  He had a turkey box around 1530 and then ate two dinner trays stating he was still very hungry after the first.  Will continue to monitor.

## 2020-08-03 NOTE — PAYOR COMM NOTE
"[facility]  GINETTE MCNEILL  PHONE  [phone]  FAX  [phone]  NPI:  [id number]    REQUEST FOR INPATIENT AUTH    Elisha Keene (45 y.o. Male)     Date of Birth Social Security Number Address Home Phone MRN    1975  [address]  [place] [address] [phone] [id number]    Christianity Marital Status          None Single       Admission Date Admission Type Admitting Provider Attending Provider Department, Room/Bed    8/1/20 Urgent Jake Gustafson MD Srinivas, Priyanka, MD [facility] CRITICAL CARE, CC07/    Discharge Date Discharge Disposition Discharge Destination                       Attending Provider:  Natalie Gibbons MD    Allergies:  No Known Allergies    Isolation:  None   Infection:  None   Code Status:  CPR    Ht:  175.3 cm (69\")   Wt:  167 kg (368 lb)    Admission Cmt:  None   Principal Problem:  None                Active Insurance as of 8/1/2020     Primary Coverage     Payor Plan Insurance Group Employer/Plan Group    ANTHEM MEDICARE REPLACEMENT ANTHEM MEDICARE ADVANTAGE [id number]     Payor Plan Address Payor Plan Phone Number Payor Plan Fax Number Effective Dates    [address] [phone]  1/1/2020 - None Entered    [facility] [id number]       Subscriber Name Subscriber Birth Date Member ID       ELISHA KEENE 1975 [id number]           Secondary Coverage     Payor Plan Insurance Group Employer/Plan Group    [facility] MEDICAID [id number]     Payor Plan Address Payor Plan Phone Number Payor Plan Fax Number Effective Dates    [address] [address] [phone]  8/1/2020 - None Entered    [facility] [id number]       Subscriber Name Subscriber Birth Date Member ID       ELISHA KEENE 1975 [id number]                 Emergency Contacts      (Rel.) Home Phone Work Phone Mobile Phone    HUAN LUI (Other) [phone] -- --    holland caruso (Mother) -- -- [phone]               History & Physical      Jake Gustafson MD at " 20 1501              [facility]    Patient Identification:  Name:  Sudarshan Keene  Age:  45 y.o.  Sex:  male  :  1975  MRN:  [id number]   Visit Number:  [id number]  Primary Care Physician:  Provider, No Known         Chief complaint: Chest pain    History of presenting illness:    Sudarshan Keene is a 45 y.o. male with PMH significant for nonobstructive coronary artery disease (patient had a heart cath in  but no stents were placed), CHF, COPD, uncontrolled hypertension, sleep apnea and arthritis presented to the outside hospital emergency department chest pain.  Patient was found to have systolic blood pressure greater than 200.  He was also complaining of chest pain and troponins were found to be elevated and patient was transferred to our facility for further evaluation management.    Patient states that he woke up this morning because of his panic attack.  Patient states that he has had panic attacks off and on.  Patient states that he started having chest pain thereafter.  Patient states that the pain was localized in right lower chest around midclavicular line in 5th-6th intercostal space.  Patient states the pain was like a muscle spasm that he thought would go away but pain persisted.  Patient said the pain lasted for about 25 minutes.  He states that he took 3 nitro glycerin but did not have any improvement in his chest pain.  Patient therefore presented to the emergency department.  Patient states that the pain was associated with nausea and vomiting.  Patient also states that he has had some dizziness and lightheadedness and shortness of breath.  Patient states that he has had similar chest pain in the past as well.  He states that he had similar chest pain when he underwent cardiac catheterization in .  Per report from ER physician at [place] where patient initially presented, patient has had multiple presentations to the ER with chest  pain and was advised admission but had left AGAINST MEDICAL ADVICE because he said that he is feeling better.  Patient denies history of smoking but states that he uses marijuana daily and last use was 12 hours ago.  He denies any alcohol use.       Review of systems:  Constitutional: Patient denies any fevers, chills, lethargy, weight loss or night sweats.  Vision and hearing: Patient denies any acute changes in vision and hearing.  Respiratory: Patient denies any cough, hemoptysis, dyspnea.  Cardiovascular: Patient is chest pain-free at this time but had chest pain earlier as per HPI.  GI: Patient denies any nausea, vomiting, hematemesis, melena and hematochezia  Genitourinary: Patient denies any dysuria, frequency, urgency or hematuria.  Neurologic: Patient denies any dizziness, lightheadedness, seizures, syncope.  Psychiatry: Patient denies any delusions, hallucinations, suicidal ideation.  Musculoskeletal: Patient denies any gait problems, joint swelling, joint pains or back pain.      Past Medical History:   Diagnosis Date   • Arthritis    • Asthma    • CHF (congestive heart failure) (CMS/Formerly [facility])    • COPD (chronic obstructive pulmonary disease) (CMS/Formerly [facility])    • Coronary artery disease    • Hypertension    • Sleep apnea      Past Surgical History:   Procedure Laterality Date   • CARDIAC CATHETERIZATION  2008   • HERNIA REPAIR  1982     Family History   Problem Relation Age of Onset   • Hypertension Mother    • Hypertension Father    • Hypertension Sister    • Heart disease Sister      Social History     Socioeconomic History   • Marital status: Single     Spouse name: Not on file   • Number of children: Not on file   • Years of education: Not on file   • Highest education level: Not on file   Tobacco Use   • Smoking status: Former Smoker     Packs/day: 0.25     Types: Cigarettes     Last attempt to quit: 2014     Years since quittin.0   • Smokeless tobacco: Never Used   Substance and Sexual Activity    • Alcohol use: Never     Frequency: Never   • Drug use: Yes     Frequency: 7.0 times per week     Types: Marijuana     Comment: tobacco free   • Sexual activity: Yes     Partners: Female     ---------------------------------------------------------------------------------------------------------------------   Allergies:  Patient has no known allergies.  ---------------------------------------------------------------------------------------------------------------------   Prior to Admission Medications     None        Hospital Scheduled Meds:    aspirin 81 mg Oral Daily   sodium chloride 10 mL Intravenous Q12H       nitroglycerin 5-200 mcg/min   Pharmacy to Dose enoxaparin (LOVENOX)      ---------------------------------------------------------------------------------------------------------------------   Vital Signs:  Temp:  [98.6 °F (37 °C)] 98.6 °F (37 °C)  Heart Rate:  [84-86] 86  Resp:  [18] 18  BP: (182-218)/(116-134) 218/134      08/01/20  1434   Weight: (!) 167 kg (368 lb 8 oz)     Body mass index is 54.42 kg/m².  ---------------------------------------------------------------------------------------------------------------------   Physical Exam:  Constitutional: Morbidly obese male, lying in bed, no acute distress.   HENT:  Head:  Normocephalic and atraumatic.  Mouth:  Moist mucous membranes.    Eyes:  Pupils are equal, round, and reactive to light.   Neck:  Neck supple.  No JVD present.    Cardiovascular:  Regular rate and rhythm. S1+S2. No murmur, rubs or gallops.   Lungs/Chest: Clear to auscultation bilaterally.  Patient has a port in right upper chest.  Abdomen:  Soft. Nontender. No viscera palpable.  Bowel sounds audible.   Musculoskeletal: No deformity or joint swelling.   Peripheral vascular: Bilateral dorsalis pedis palpable.  Bilateral lower extremity minimal pitting edema.  Neurological:  Alert and oriented to person, place, and time.  Cranial nerves grossly intact. Strength bilaterally  symmetrical in upper and lower extremities.   Skin:  Skin is warm and dry. No rash noted. No pallor.  ---------------------------------------------------------------------------------------------------------------------  EKG: Normal sinus rhythm.  Poor R wave progression anterior leads.    ---------------------------------------------------------------------------------------------------------------------                 Invalid input(s): PROTCrCl cannot be calculated (Patient's most recent lab result is older than the maximum 30 days allowed.).  No results found for: AMMONIA          Lab Results   Component Value Date    HGBA1C 5.0 12/21/2015     Lab Results   Component Value Date    TSH 1.061 12/21/2015    FREET4 1.05 12/21/2015     No results found for: PREGTESTUR, PREGSERUM, HCG, HCGQUANT  Pain Management Panel     Pain Management Panel Latest Ref Rng & Units 12/21/2015    AMPHETAMINES SCREEN, URINE Negative Negative    BARBITURATES SCREEN Negative Negative    BENZODIAZEPINE SCREEN, URINE Negative Negative    COCAINE SCREEN, URINE Negative Negative    METHADONE SCREEN, URINE Negative Negative        No results found for: BLOODCX  No results found for: URINECX  No results found for: WOUNDCX  No results found for: STOOLCX      ---------------------------------------------------------------------------------------------------------------------  Imaging Results (Last 7 Days)     ** No results found for the last 168 hours. **          I have personally reviewed the radiology images and read the final radiology report.  ---------------------------------------------------------------------------------------------------------------------  Assessment and Plan:    -Hypertensive urgency/emergency  Patient's blood pressure was 190 systolic when he presented here.  At the outside facility his blood pressure has been 240.  Patient states that he has history of hypertension and his blood pressure runs high.  He denies missing  any of his medications.  I will continue to monitor glycerin drip and once his home meds have been reconciled, I would resume his home meds.  Aim to keep systolic blood pressure less than 180.  Will consider addition of Cardene drip if needed.    -Chest pain/NSTEMI  Per report from physical at [place], patient had elevated troponins.  Patient has had previously elevated troponins as well.  EKG showed no acute ST-T wave changes.  Patient has atypical features with his chest pain.  I will start patient on aspirin and continue nitroglycerin for his chest pain.  Will add beta-blocker and statin.  Cardiology is consulted.  Will obtain echocardiogram and check serial cardiac enzymes.    -CHF  Patient reports history of CHF and currently has minimal pitting edema lower extremities.  I will check a BNP level and echocardiogram to assess left ventricular function.  Patient does not appear in fluid overload at this time.  Will check a chest x-ray and further plan will based on the results of above investigation.  Continue to monitor his intake and output.    -COPD  Patient denies tobacco use but states that he usually smokes marijuana.  Currently does not appear to be in exacerbation.  I will keep patient on duo nebs.    -History of panic attacks  Patient states that he has had panic attacks for a while.  I will refill his medications when reconciled by pharmacy.  Patient may benefit from psych consultation.  Given his history of marijuana use, I will avoid any benzodiazepines at this time.    Activity: Bedrest  Nutrition: Cardiac diet, low-sodium  Fluids: None  DVT prophylaxis: Lovenox SQ  GI prophylaxis: Pepcid    I discussed the plan of care with patient and nursing staff.  Questions answered.  Patient is at high risk for decompensation due to hypertensive urgency, NSTEMI, CHF, COPD.    Jake Gustafson MD  08/01/20  15:01    Electronically signed by Jake Gustafson MD at 08/01/20 8770       Emergency Department Notes     No notes of this type exist for this encounter.           Current Facility-Administered Medications   Medication Dose Route Frequency Provider Last Rate Last Dose   • acetaminophen (TYLENOL) tablet 650 mg  650 mg Oral Q4H PRN Jake Gustafson MD   650 mg at 08/02/20 2127    Or   • acetaminophen (TYLENOL) 160 MG/5ML solution 650 mg  650 mg Oral Q4H PRN Jake Gustafson MD        Or   • acetaminophen (TYLENOL) suppository 650 mg  650 mg Rectal Q4H PRN Jake Gustafson MD       • aluminum-magnesium hydroxide-simethicone (MAALOX MAX) 400-400-40 MG/5ML suspension 15 mL  15 mL Oral Q6H PRN Jake Gustafson MD       • amLODIPine (NORVASC) tablet 5 mg  5 mg Oral Q24H Subramaniyam, Ben MD Shai   5 mg at 08/02/20 2001   • aspirin EC tablet 81 mg  81 mg Oral Daily Jake Gustafson MD   81 mg at 08/02/20 0839   • atorvastatin (LIPITOR) tablet 40 mg  40 mg Oral Nightly Jake Gustafson MD   40 mg at 08/02/20 2002   • bisacodyl (DULCOLAX) EC tablet 5 mg  5 mg Oral Daily PRN Jake Gustafson MD       • bisacodyl (DULCOLAX) suppository 10 mg  10 mg Rectal Daily PRN Jake Gustafson MD       • buPROPion SR (WELLBUTRIN SR) 12 hr tablet 200 mg  200 mg Oral Daily Jake Gustafson MD   200 mg at 08/02/20 0839   • cloNIDine (CATAPRES) tablet 0.2 mg  0.2 mg Oral TID Jake Gustafson MD   0.2 mg at 08/02/20 2002   • docusate sodium (COLACE) capsule 100 mg  100 mg Oral BID PRN Jake Gustafson MD       • hydrALAZINE (APRESOLINE) tablet 50 mg  50 mg Oral TID Jake Gustafson MD   50 mg at 08/02/20 2002   • hydrOXYzine (ATARAX) tablet 25 mg  25 mg Oral TID PRN Miriam Villegas MD   25 mg at 08/02/20 2127   • ipratropium-albuterol (DUO-NEB) nebulizer solution 3 mL  3 mL Nebulization 4x Daily - RT Jake Gustafson MD   3 mL at 08/03/20 0700   • isosorbide mononitrate (IMDUR) 24 hr tablet 120 mg  120 mg Oral Daily Jake Gustafson MD   120 mg at 08/02/20 0839   • labetalol  (NORMODYNE) tablet 300 mg  300 mg Oral TID Jake Gustafson MD   300 mg at 08/02/20 2003   • magnesium hydroxide (MILK OF MAGNESIA) suspension 2400 mg/10mL 10 mL  10 mL Oral Daily PRN Jake Gustafson MD       • montelukast (SINGULAIR) tablet 10 mg  10 mg Oral Nightly Jake Gustafson MD   10 mg at 08/02/20 2002   • niCARdipine (CARDENE) 25 mg in 250 mL NS (0.1 mg/mL) infusion  5-15 mg/hr Intravenous Titrated Jake Gustafson MD 50 mL/hr at 08/03/20 0229 5 mg/hr at 08/03/20 0229   • nitroglycerin (TRIDIL) 200 mcg/ml infusion  5-200 mcg/min Intravenous Titrated Jake Gustafson MD   Stopped at 08/01/20 1758   • ondansetron (ZOFRAN) tablet 4 mg  4 mg Oral Q6H PRN Jake Gustafson MD        Or   • ondansetron (ZOFRAN) injection 4 mg  4 mg Intravenous Q6H PRN Jake Gustafson MD       • sodium chloride 0.9 % flush 10 mL  10 mL Intravenous Q12H Jake Gustafson MD   10 mL at 08/02/20 2003   • sodium chloride 0.9 % flush 10 mL  10 mL Intravenous PRN Jake Gustafson MD       • sodium chloride 0.9 % flush 10 mL  10 mL Intravenous Q12H Jake Gustafson MD   10 mL at 08/02/20 2002   • sodium chloride 0.9 % flush 10 mL  10 mL Intravenous PRN Jake Gustafson MD       • sodium chloride 0.9 % flush 10 mL  10 mL Intravenous Q12H Brittany Luz MD   10 mL at 08/02/20 2001   • sodium chloride 0.9 % flush 10 mL  10 mL Intravenous Q12H Brittany Luz MD   10 mL at 08/02/20 2002   • sodium chloride 0.9 % flush 10 mL  10 mL Intravenous Q12H Brittany Luz MD   10 mL at 08/02/20 2002   • sodium chloride 0.9 % flush 10 mL  10 mL Intravenous PRN Brittany Luz MD       • sodium chloride 0.9 % flush 20 mL  20 mL Intravenous PRN Brittany Luz MD       • tamsulosin (FLOMAX) 24 hr capsule 0.4 mg  0.4 mg Oral Nightly Jake Gustafson MD   0.4 mg at 08/02/20 2002     Orders (last 24 hrs)      Start     Ordered    08/03/20 0756  Inpatient Nephrology Consult  Once      Specialty:  Nephrology  Provider:  (Not yet assigned)    08/03/20 0755    08/03/20 0755  Sodium, Urine, Random - Urine, Clean Catch  Once      08/03/20 0755    08/03/20 0754  Urinalysis With Culture If Indicated - Urine, Clean Catch  Once      08/03/20 0755    08/03/20 0500  ECG 12 Lead  Tomorrow AM      08/02/20 1349    08/02/20 2034  hydrOXYzine (ATARAX) tablet 25 mg  3 Times Daily PRN      08/02/20 2034 08/02/20 1900  amLODIPine (NORVASC) tablet 5 mg  Every 24 Hours Scheduled      08/02/20 1713    08/02/20 1445  sodium chloride 0.9 % flush 10 mL  Every 12 Hours Scheduled      08/02/20 1345    08/02/20 1445  sodium chloride 0.9 % flush 10 mL  Every 12 Hours Scheduled      08/02/20 1345    08/02/20 1445  sodium chloride 0.9 % flush 10 mL  Every 12 Hours Scheduled      08/02/20 1345    08/02/20 1400  aPTT  Timed      08/02/20 0910    08/02/20 1346  Connectors / Hubs Must Be Scrubbed 15 Seconds Using 70% Alcohol Before Access - Allow to Dry Before Accessing Line  Continuous      08/02/20 1345    08/02/20 1346  Change Needleless Connectors  Per Order Details     Comments:  Change Needleless Connectors When:  - Removed For Any Reason  - Residual Blood or Debris Within Connector  - Prior to Drawing Blood Cultures  - Contamination of Connector  - After Administration of Blood or Blood Components    08/02/20 1345    08/02/20 1346  CENTRAL LINE CARE - Change Transparent Dressing Every 7 Days  Weekly     Comments:  Per [facility] Policy    08/02/20 1345    08/02/20 1346  CENTRAL LINE CARE - Change Needleless Connectors  Per Order Details     Comments:  Per [facility] Policy    08/02/20 1345    08/02/20 1345  sodium chloride 0.9 % flush 10 mL  As Needed      08/02/20 1345    08/02/20 1345  sodium chloride 0.9 % flush 20 mL  As Needed      08/02/20 1345    08/02/20 1330  CBC & Differential  Daily,   Status:  Canceled      08/02/20 1329    08/02/20 1330  Comprehensive Metabolic Panel  Daily      08/02/20 1329    08/02/20 1330  CBC  Auto Differential  PROCEDURE ONCE      08/02/20 1329    08/02/20 1329  Troponin  Once      08/02/20 1329    08/02/20 1305  XR Chest AP  1 Time Imaging      08/02/20 1304    08/02/20 1251  lidocaine (XYLOCAINE) 1 % injection  - ADS Override Pull     Note to Pharmacy:  Created by cabinet override    08/02/20 1251    08/02/20 1118  Inpatient General Surgery Consult  Once     Specialty:  General Surgery  Provider:  Brittany Luz MD    08/02/20 1125    08/02/20 0800  aPTT  Timed      08/02/20 0200    08/01/20 2100  sodium chloride 0.9 % flush 10 mL  Every 12 Hours Scheduled      08/01/20 1459    08/01/20 2100  sodium chloride 0.9 % flush 10 mL  Every 12 Hours Scheduled      08/01/20 1534    08/01/20 2100  atorvastatin (LIPITOR) tablet 40 mg  Nightly      08/01/20 1728    08/01/20 2100  cloNIDine (CATAPRES) tablet 0.2 mg  3 Times Daily      08/01/20 1728    08/01/20 2100  hydrALAZINE (APRESOLINE) tablet 50 mg  3 Times Daily      08/01/20 1728    08/01/20 2100  labetalol (NORMODYNE) tablet 300 mg  3 Times Daily      08/01/20 1728    08/01/20 2100  montelukast (SINGULAIR) tablet 10 mg  Nightly      08/01/20 1728    08/01/20 2100  tamsulosin (FLOMAX) 24 hr capsule 0.4 mg  Nightly      08/01/20 1728    08/01/20 1900  buPROPion SR (WELLBUTRIN SR) 12 hr tablet 200 mg  Daily      08/01/20 1728    08/01/20 1900  isosorbide mononitrate (IMDUR) 24 hr tablet 120 mg  Daily      08/01/20 1728    08/01/20 1730  heparin 74888 units/250 mL (100 units/mL) in 0.45 % NaCl infusion  Titrated,   Status:  Discontinued      08/01/20 1644    08/01/20 1727  ALPRAZolam (XANAX) tablet 1 mg  2 Times Daily PRN,   Status:  Discontinued      08/01/20 1728    08/01/20 1645  ipratropium-albuterol (DUO-NEB) nebulizer solution 3 mL  4 Times Daily - RT      08/01/20 1645    08/01/20 1645  niCARdipine (CARDENE) 25 mg in 250 mL NS (0.1 mg/mL) infusion  Titrated      08/01/20 1551    08/01/20 1643  aPTT  As Needed,   Status:  Canceled      08/01/20 1640     08/01/20 1643  After 2 Consecutive Therapeutic aPTTs, Obtain aPTT Daily.  If a Rate Adjustment is Necessary, Resume Every 6 Hour aPTT Draws  As Needed,   Status:  Canceled      08/01/20 1644    08/01/20 1643  heparin (porcine) 5000 UNIT/ML injection 5,000 Units  As Needed,   Status:  Discontinued      08/01/20 1644    08/01/20 1643  heparin (porcine) 5000 UNIT/ML injection 2,500 Units  As Needed,   Status:  Discontinued      08/01/20 1644    08/01/20 1600  aspirin EC tablet 81 mg  Daily      08/01/20 1501    08/01/20 1545  nitroglycerin (TRIDIL) 200 mcg/ml infusion  Titrated      08/01/20 1456    08/01/20 1533  sodium chloride 0.9 % flush 10 mL  As Needed      08/01/20 1534    08/01/20 1459  aluminum-magnesium hydroxide-simethicone (MAALOX MAX) 400-400-40 MG/5ML suspension 15 mL  Every 6 Hours PRN      08/01/20 1459    08/01/20 1459  ondansetron (ZOFRAN) tablet 4 mg  Every 6 Hours PRN      08/01/20 1459    08/01/20 1459  ondansetron (ZOFRAN) injection 4 mg  Every 6 Hours PRN      08/01/20 1459    08/01/20 1459  docusate sodium (COLACE) capsule 100 mg  2 Times Daily PRN      08/01/20 1459    08/01/20 1459  bisacodyl (DULCOLAX) EC tablet 5 mg  Daily PRN      08/01/20 1459    08/01/20 1459  bisacodyl (DULCOLAX) suppository 10 mg  Daily PRN      08/01/20 1459    08/01/20 1458  acetaminophen (TYLENOL) tablet 650 mg  Every 4 Hours PRN      08/01/20 1459    08/01/20 1458  acetaminophen (TYLENOL) 160 MG/5ML solution 650 mg  Every 4 Hours PRN      08/01/20 1459    08/01/20 1458  acetaminophen (TYLENOL) suppository 650 mg  Every 4 Hours PRN      08/01/20 1459    08/01/20 1453  sodium chloride 0.9 % flush 10 mL  As Needed      08/01/20 1459    08/01/20 1453  magnesium hydroxide (MILK OF MAGNESIA) suspension 2400 mg/10mL 10 mL  Daily PRN      08/01/20 1459    Unscheduled  Up With Assistance  As Needed      08/01/20 1459    Unscheduled  Change Dressing to IV Site As Needed When Damp, Loose or Soiled  As Needed      08/01/20  1534    Unscheduled  Change Dressing to IV Site As Needed When Damp, Loose or Soiled  As Needed      08/02/20 1345    --  labetalol (NORMODYNE) 300 MG tablet  3 Times Daily      08/01/20 1605    --  atorvastatin (LIPITOR) 40 MG tablet  Nightly      08/01/20 1605    --  tamsulosin (FLOMAX) 0.4 MG capsule 24 hr capsule  Nightly      08/01/20 1605    --  losartan (COZAAR) 25 MG tablet  Daily      08/01/20 1605    --  cloNIDine (CATAPRES) 0.2 MG tablet  3 Times Daily      08/01/20 1605    --  montelukast (SINGULAIR) 10 MG tablet  Nightly      08/01/20 1605    --  aspirin 81 MG EC tablet  Daily      08/01/20 1605    --  buPROPion SR (WELLBUTRIN SR) 100 MG 12 hr tablet  Daily      08/01/20 1605    --  hydrALAZINE (APRESOLINE) 50 MG tablet  3 Times Daily      08/01/20 1605    --  isosorbide mononitrate (IMDUR) 120 MG 24 hr tablet  Daily      08/01/20 1605    --  bumetanide (BUMEX) 1 MG tablet  Daily      08/01/20 1605    --  ALPRAZolam (XANAX) 1 MG tablet  2 Times Daily PRN      08/01/20 1605    --  albuterol sulfate  (90 Base) MCG/ACT inhaler  Every 4 Hours PRN      08/01/20 1652    --  albuterol (PROVENTIL) (2.5 MG/3ML) 0.083% nebulizer solution  Every 4 Hours PRN      08/01/20 1652    --  tiotropium (SPIRIVA) 18 MCG per inhalation capsule  Daily - RT      08/01/20 1652    Pending  albuterol (PROVENTIL) nebulizer solution 0.083% 2.5 mg/3mL  Every 4 Hours PRN      Pending    Pending  ALPRAZolam (XANAX) tablet 1 mg  2 Times Daily PRN      Pending    Pending  aspirin EC tablet 81 mg  Daily      Pending    Pending  atorvastatin (LIPITOR) tablet 40 mg  Nightly      Pending    Pending  bumetanide (BUMEX) tablet 1 mg  Daily      Pending    Pending  buPROPion SR (WELLBUTRIN SR) 12 hr tablet 200 mg  Daily      Pending    Pending  cloNIDine (CATAPRES) tablet 0.2 mg  Every 8 Hours Scheduled      Pending    Pending  hydrALAZINE (APRESOLINE) tablet 50 mg  Every 8 Hours Scheduled      Pending    Pending  isosorbide mononitrate  (IMDUR) 24 hr tablet 120 mg  Daily      Pending    Pending  labetalol (NORMODYNE) tablet 300 mg  Every 8 Hours Scheduled      Pending    Pending  losartan (COZAAR) tablet 25 mg  Daily      Pending    Pending  montelukast (SINGULAIR) tablet 10 mg  Nightly      Pending    Pending  tamsulosin (FLOMAX) 24 hr capsule 0.4 mg  Nightly      Pending    Pending  ipratropium (ATROVENT) nebulizer solution 0.5 mg  4 Times Daily - RT      Pending                   Physician Progress Notes (last 72 hours) (Notes from 20 0757 through 20 0757)      Jake Gustafson MD at 20 1123              [facility]     Patient Identification:  Name:  Sudarshan Keene  Age:  45 y.o.  Sex:  male  :  1975  MRN:  [id number]  Visit Number:  [id number]  Primary Care Provider:  Provider, No Known    Length of stay:  1    Chief complaint:  45 y.o. old male admitted with No chief complaint on file.          Subjective:    Patient seen with nursing staff.  No family present at bedside.  Patient is lying comfortably in bed and states that she is feeling much better.  Patient denies any chest pain, dyspnea, palpitations, nausea or vomiting.  Patient has been off of nitroglycerin drip since this morning and Cardene drip is being titrated down.  Blood pressure is improved compared to yesterday.  No other issues reported by the nursing staff.           Current Hospital Meds:    aspirin 81 mg Oral Daily   atorvastatin 40 mg Oral Nightly   buPROPion  mg Oral Daily   cloNIDine 0.2 mg Oral TID   hydrALAZINE 50 mg Oral TID   ipratropium-albuterol 3 mL Nebulization 4x Daily - RT   isosorbide mononitrate 120 mg Oral Daily   labetalol 300 mg Oral TID   montelukast 10 mg Oral Nightly   sodium chloride 10 mL Intravenous Q12H   sodium chloride 10 mL Intravenous Q12H   tamsulosin 0.4 mg Oral Nightly       heparin (porcine) 5.99 Units/kg/hr Last Rate: 9.99 Units/kg/hr (20 0154)   niCARdipine 5-15  mg/hr Last Rate: 10 mg/hr (08/02/20 0913)   nitroglycerin 5-200 mcg/min Last Rate: Stopped (08/01/20 1758)     ----------------------------------------------------------------------------------------------------------------------  Vital Signs:  Temp:  [97.9 °F (36.6 °C)-98.9 °F (37.2 °C)] 98.7 °F (37.1 °C)  Heart Rate:  [] 93  Resp:  [12-26] 18  BP: (120-245)/() 157/93      08/01/20  1434 08/02/20  0300   Weight: (!) 167 kg (368 lb 8 oz) (!) 167 kg (369 lb)     Body mass index is 54.49 kg/m².    Intake/Output Summary (Last 24 hours) at 8/2/2020 1125  Last data filed at 8/2/2020 0904  Gross per 24 hour   Intake 1783.11 ml   Output 4300 ml   Net -2516.89 ml     Diet Regular; Cardiac, Low Sodium  ----------------------------------------------------------------------------------------------------------------------  Physical exam:  Constitutional: Morbidly obese male, lying in bed, no acute distress.   HENT:  Head:  Normocephalic and atraumatic.  Mouth:  Moist mucous membranes.    Eyes:  Pupils are equal, round, and reactive to light.   Neck:  Neck supple.  No JVD present.    Cardiovascular:  Regular rate and rhythm. S1+S2. No murmur, rubs or gallops.   Lungs/Chest: Clear to auscultation bilaterally.  Patient has a port in right upper chest.  Abdomen:  Soft. Nontender. No viscera palpable.  Bowel sounds audible.   Musculoskeletal: No deformity or joint swelling.   Peripheral vascular: Bilateral dorsalis pedis palpable.  Bilateral lower extremity minimal pitting edema.  Neurological:  Alert and oriented to person, place, and time.  Cranial nerves grossly intact. Strength bilaterally symmetrical in upper and lower extremities.   Skin:  Skin is warm and dry. No rash noted. No pallor.  ----------------------------------------------------------------------------------------------------------------------  Tele: Sinus  rhythm  ----------------------------------------------------------------------------------------------------------------------  Results from last 7 days   Lab Units 08/01/20  1909 08/01/20  1528   CK TOTAL U/L  --  315*   CKMB ng/mL  --  8.78   TROPONIN T ng/mL 0.049* 0.059*     Results from last 7 days   Lab Units 08/01/20  1529 08/01/20  1528   CRP mg/dL  --  1.54*   WBC 10*3/mm3  --  5.77   HEMOGLOBIN g/dL  --  12.7*   HEMATOCRIT %  --  41.3   MCV fL  --  84.6   MCHC g/dL  --  30.8*   PLATELETS 10*3/mm3  --  190   INR  0.91  --      Results from last 7 days   Lab Units 08/01/20  1700   PH, ARTERIAL pH units 7.358   PO2 ART mm Hg 63.3*   PCO2, ARTERIAL mm Hg 45.5*   HCO3 ART mmol/L 25.6     Results from last 7 days   Lab Units 08/01/20  1528   SODIUM mmol/L 141   POTASSIUM mmol/L 4.0   MAGNESIUM mg/dL 2.0   CHLORIDE mmol/L 103   CO2 mmol/L 24.7   BUN mg/dL 37*   CREATININE mg/dL 2.41*   EGFR IF AFRICN AM mL/min/1.73 35*   CALCIUM mg/dL 8.6   GLUCOSE mg/dL 80   ALBUMIN g/dL 3.40*   BILIRUBIN mg/dL 0.2   ALK PHOS U/L 72   AST (SGOT) U/L 21   ALT (SGPT) U/L 22   Estimated Creatinine Clearance: 59.7 mL/min (A) (by C-G formula based on SCr of 2.41 mg/dL (H)).    No results found for: AMMONIA  Results from last 7 days   Lab Units 08/01/20  1528   CHOLESTEROL mg/dL 153   TRIGLYCERIDES mg/dL 65   HDL CHOL mg/dL 54   LDL CHOL mg/dL 86     No results found for: BLOODCX  No results found for: URINECX  No results found for: WOUNDCX  No results found for: STOOLCX    I have personally looked at the labs and they are summarized above.  ----------------------------------------------------------------------------------------------------------------------  Imaging Results (Last 24 Hours)     Procedure Component Value Units Date/Time    NM Lung Scan Perfusion Particulate [[id number]] Collected:  08/01/20 2000     Updated:  08/01/20 2002    Narrative:       NM Pulm Perf Imaging    HISTORY:  45-year-old male with chest pain, shortness  of breath, and elevated d-dimer today.    DOSE:  *  4 mCi Tc99m MAA.    COMPARISON:   Correlation made to chest x-ray 8/1/2020.    FINDINGS:   Perfusion only imaging:  There is uniform distribution of radiotracer throughout both lungs.       Impression:       No focal perfusion defects. Low probability for pulmonary vascular occlusive disease.    Signer Name: Isidoro Aldrich MD   Signed: 8/1/2020 8:00 PM   Workstation Name: [facility]    [facility]    US Venous Doppler Lower Extremity Bilateral (duplex) [[id number]] Collected:  08/01/20 1709     Updated:  08/01/20 1711    Narrative:       PROCEDURE: US Veins LE Duplex BILAT    COMPARISON: No comparison study or studies for correlation purposes available at time of dictation.     INDICATIONS: rule out DVT, elevated d-dimer patient has a 2 year history of swelling in both lower extremities    FINDINGS:  Gray-scale, color flow and Duplex spectral waveform analysis and interrogation of bilateral lower extremity venous structures is performed with augmentation if possible..   Common femoral, deep femoral, superficial femoral, popliteal, peroneal and posterior tibial veins interrogated by department protocol.    These interrogated vessels demonstrate normal compressibility, color flow and augmentation upon distal calf compression. There is no evidence of deep venous thrombosis in either leg.      Impression:       No evidence of DVT in either lower extremity.    Signer Name: Ailyn Shea MD   Signed: 8/1/2020 5:09 PM   [facility]    [facility]    XR Chest 1 View [[id number]] Collected:  08/01/20 1554     Updated:  08/01/20 1612    Narrative:       EXAMINATION: XR CHEST 1 VW-      CLINICAL INDICATION:     chest pain, port position assessment     TECHNIQUE: Single AP view of chest.      COMPARISON: EXAMINATION: XR CHEST 1 VW-      CLINICAL INDICATION:     chest pain, port position assessment      TECHNIQUE:  XR CHEST 1 VW-      COMPARISON: NONE      FINDINGS:   The lungs remain aerated.  Heart and mediastinum contours are unremarkable.  No pleural effusion.  No pneumothorax.   Bony and soft tissue structures are unremarkable.       Impression:       No radiographic evidence of acute cardiac or pulmonary  disease.        FINDINGS: Right port with tip in SVC.  There is bibasilar atelectasis.   Lungs are otherwise aerated.  Support tube and lines are in unchanged position.   Heart size is stable.  No pneumothorax.   Tiny bilateral pleural effusions persist.      IMPRESSION: Stable appearance of the chest.     This report was finalized on 8/1/2020 3:54 PM by Dr. Stu Simpson MD.           ----------------------------------------------------------------------------------------------------------------------  Assessment and Plan:    -Hypertensive urgency/emergency  Improving.  Patient has had improvement in his blood pressure since yesterday and is off of nitroglycerin drip.  He is still on Cardene drip but blood pressure is improving it is being titrated down.  Continue clonidine, hydralazine, isosorbide mononitrate and labetalol.  Continue to monitor blood pressure closely and adjust antihypertensives as needed.    -NSTEMI  Patient is chest pain-free.  Troponin was elevated to 0.059 on admission and trended down.  EKG showed no acute ST-T wave changes.  Continue aspirin, beta-blocker and statin.  Avoiding ACE inhibitor due to elevated creatinine.    -Acute on chronic diastolic CHF  Patient had elevated BNP at admission.  He diuresed well and net fluid balance is -2800 mL since admission.    Echocardiogram showed normal LV systolic function, EF of 51-55%, severe concentric hypertrophy, grade 2 diastolic dysfunction, mild MVR, mild TVR, no evidence of pericardial effusion.  Continue hydralazine and nitrate combination.  Continue to monitor intake and output.    -COPD  No S/S of acute exacerbation.  Continue duo  nebs.    -Adjustment disorder with history of panic attacks  Continue Wellbutrin.  Patient had alprazolam listed as his home medication and has been receiving alprazolam from Dr. Azar in [place].  His last prescription was on 6/15/2020 but since his urine drug screen has been negative here for benzodiazepines and and patient has not had a prescription in last 30 days, I will discontinue alprazolam.    -Substance abuse  Urine drug screen was positive for THC and opiates.  Patient admits to using marijuana regularly.    -Elevated d-dimer  VQ scan was low probability for PE and lower extremity Doppler ultrasound showed no evidence of DVT.    -ANSELMO vs. acute on CKD  Creatinine is elevated 2.4 on admission, previously creatinine was 1.2 in 2015.  I will obtain renal ultrasound.  Avoid nephrotoxic agents.  Monitor renal function closely.    Activity: Up with assist  Nutrition: Regular diet  Fluids: NS at 125 mL/h  DVT prophylaxis: Heparin Subcu  GI prophylaxis: Pepcid      The patient is high risk due to: Hypertensive urgency, hypertensive urgency, acute kidney injury, diastolic CHF    I discussed the patient's findings and my recommendations with patient and nursing staff.    Jake Gustafson MD  08/02/20  11:25    Electronically signed by Jake Gustafson MD at 08/02/20 1352          Consult Notes (last 72 hours) (Notes from 07/31/20 0757 through 08/03/20 0757)      Ben Gilbert MD at 08/02/20 0949            Consults  Date of Admit: 8/1/2020  Date of Consult: 08/02/20  Jake Gustafson MD Torrey Oliver  1975  Consulting Physician: Dr. Gilbert    Cardiology consultation    Reason for consultation: NSTEMI and hypertension emergency    Assessment and Plan:     1. Hypertension emergency, now improved, hx of medical non compliance and recurrent issues with uncontrolled BP  2. Hypertensive heart disease, Echo showed severe LVH, no significant obstruction, will need better long term BP control    3. CKD stage III, will need outpatient evaluation for renovascular HTN, likely get CT angiogram r/o AVINASH  4. Pt has had multiple evaluations in past at different facility, he has had cardiac cath at [facility] per pt no CAD. Will get records. His mild troponin elevation is likely from uncontrolled HTN and CKD, trending down,no chest pain currently. Echo showed no wall motion changes. Recommend D/C IV heparin drip if initiated for this.   5. Recommend adding Amlodipine 5 mg and increase Hydralazine to 100 mg and titrate down the IV cardene drip. Increase Amlodipine to 10 as BP tolerates.   6. Discussed about compliance in detail with pt. Will need outpatient F/u in 2-4 weeks after D/C            History of Present Illness    Subjective     Chief Compliant: Chest pains  Sudarshan Keene is a 45 y.o. male with past medical history significant for no known history of coronary artery disease however patient does have history of chronic diastolic heart failure, chronic kidney disease stage III, essential hypertension, and dyslipidemia.  Patient presented to [facility] after transfer from [facility] due to chest pains with elevated troponin.    Speaking to the patient patient reports that his chest pain started early Saturday morning and middle of the night roughly at 3 AM which was on 8/1/2020.  Patient reports he was asleep when his chest pains awoke him that he describes as a squeezing sensation in his right side of his chest that did not radiate anywhere.  He does report that the pain was severe enough where he decided to take nitroglycerin and took up to 3 doses with no relief which prompted him to go to the local emergency department.  He does report that this chest pain was associated with diaphoresis, shortness of breath, and nausea.  He denies any history of tobacco abuse or diabetes.        Echocardiogram on 8/2/2020  Interpretation Summary   · Left ventricular systolic function is normal.  Estimated EF appears to be in the range of 51 - 55%  · Left ventricular wall thickness is consistent with severe concentric hypertrophy.  · Left ventricular diastolic dysfunction (grade II) consistent with pseudonormalization.  · Mild mitral valve regurgitation is present  · Mild tricuspid valve regurgitation is present.  · There is no evidence of pericardial effusion.  · No previous study to compare           Past Medical History:   Diagnosis Date   • Arthritis    • Asthma    • CHF (congestive heart failure) (CMS/[facility])    • COPD (chronic obstructive pulmonary disease) (CMS/[facility])    • Coronary artery disease    • Hypertension    • Sleep apnea      Past Surgical History:   Procedure Laterality Date   • CARDIAC CATHETERIZATION  2008   • HERNIA REPAIR  1982     Family History   Problem Relation Age of Onset   • Hypertension Mother    • Hypertension Father    • Hypertension Sister    • Heart disease Sister      Social History     Tobacco Use   • Smoking status: Former Smoker     Packs/day: 0.25     Types: Cigarettes     Last attempt to quit: 2014     Years since quittin.0   • Smokeless tobacco: Never Used   Substance Use Topics   • Alcohol use: Never     Frequency: Never   • Drug use: Yes     Frequency: 7.0 times per week     Types: Marijuana     Comment: tobacco free     Medications Prior to Admission   Medication Sig Dispense Refill Last Dose   • albuterol (PROVENTIL) (2.5 MG/3ML) 0.083% nebulizer solution Take 2.5 mg by nebulization Every 4 (Four) Hours As Needed for Wheezing.   Past Week at Unknown time   • albuterol sulfate  (90 Base) MCG/ACT inhaler Inhale 2 puffs Every 4 (Four) Hours As Needed for Wheezing.   Past Week at Unknown time   • ALPRAZolam (XANAX) 1 MG tablet Take 1 mg by mouth 2 (Two) Times a Day As Needed for Anxiety.   2020 at pm   • aspirin 81 MG EC tablet Take 81 mg by mouth Daily.   2020 at am   • atorvastatin (LIPITOR) 40 MG tablet Take 40 mg by mouth Every Night.    7/31/2020 at pm   • bumetanide (BUMEX) 1 MG tablet Take 1 mg by mouth Daily.   7/31/2020 at am   • buPROPion SR (WELLBUTRIN SR) 100 MG 12 hr tablet Take 200 mg by mouth Daily.   7/31/2020 at am   • cloNIDine (CATAPRES) 0.2 MG tablet Take 0.2 mg by mouth 3 (Three) Times a Day.   7/31/2020 at pm   • hydrALAZINE (APRESOLINE) 50 MG tablet Take 50 mg by mouth 3 (Three) Times a Day.   7/31/2020 at pm   • isosorbide mononitrate (IMDUR) 120 MG 24 hr tablet Take 120 mg by mouth Daily.   7/31/2020 at am   • labetalol (NORMODYNE) 300 MG tablet Take 300 mg by mouth 3 (Three) Times a Day.   7/31/2020 at pm   • losartan (COZAAR) 25 MG tablet Take 25 mg by mouth Daily.   7/31/2020 at am   • montelukast (SINGULAIR) 10 MG tablet Take 10 mg by mouth Every Night.   7/31/2020 at pm   • tamsulosin (FLOMAX) 0.4 MG capsule 24 hr capsule Take 1 capsule by mouth Every Night.   7/31/2020 at pm   • tiotropium (SPIRIVA) 18 MCG per inhalation capsule Place 1 capsule into inhaler and inhale Daily.   7/31/2020 at am     Allergies:  Patient has no known allergies.      Current Facility-Administered Medications:   •  acetaminophen (TYLENOL) tablet 650 mg, 650 mg, Oral, Q4H PRN **OR** acetaminophen (TYLENOL) 160 MG/5ML solution 650 mg, 650 mg, Oral, Q4H PRN **OR** acetaminophen (TYLENOL) suppository 650 mg, 650 mg, Rectal, Q4H PRN, Jake Gustafson MD  •  ALPRAZolam (XANAX) tablet 1 mg, 1 mg, Oral, BID PRN, Jake Gustafson MD  •  aluminum-magnesium hydroxide-simethicone (MAALOX MAX) 400-400-40 MG/5ML suspension 15 mL, 15 mL, Oral, Q6H PRN, Jake Gustafson MD  •  aspirin EC tablet 81 mg, 81 mg, Oral, Daily, Jake Gustafson MD, 81 mg at 08/02/20 0839  •  atorvastatin (LIPITOR) tablet 40 mg, 40 mg, Oral, Nightly, Jake Gustafson MD, 40 mg at 08/01/20 2113  •  bisacodyl (DULCOLAX) EC tablet 5 mg, 5 mg, Oral, Daily PRN, Jake Gustafson MD  •  bisacodyl (DULCOLAX) suppository 10 mg, 10 mg, Rectal, Daily PRN, Jake Gustafson  MD Herve  •  buPROPion SR (WELLBUTRIN SR) 12 hr tablet 200 mg, 200 mg, Oral, Daily, Jake Gustafson MD, 200 mg at 08/02/20 0839  •  cloNIDine (CATAPRES) tablet 0.2 mg, 0.2 mg, Oral, TID, Jake Gustafson MD, 0.2 mg at 08/02/20 0839  •  docusate sodium (COLACE) capsule 100 mg, 100 mg, Oral, BID PRN, Jake Gustafson MD  •  heparin (porcine) 5000 UNIT/ML injection 2,500 Units, 2,500 Units, Intravenous, PRN, Jake Gustafson MD  •  heparin (porcine) 5000 UNIT/ML injection 5,000 Units, 5,000 Units, Intravenous, PRN, Jake Gustafson MD  •  heparin 62791 units/250 mL (100 units/mL) in 0.45 % NaCl infusion, 5.99 Units/kg/hr, Intravenous, Titrated, Jake Gustafson MD, Last Rate: 16.68 mL/hr at 08/02/20 0154, 9.99 Units/kg/hr at 08/02/20 0154  •  hydrALAZINE (APRESOLINE) tablet 50 mg, 50 mg, Oral, TID, Jake Gustafson MD, 50 mg at 08/02/20 0839  •  ipratropium-albuterol (DUO-NEB) nebulizer solution 3 mL, 3 mL, Nebulization, 4x Daily - RT, Jake Gustafson MD, 3 mL at 08/02/20 0707  •  isosorbide mononitrate (IMDUR) 24 hr tablet 120 mg, 120 mg, Oral, Daily, Jake Gustafson MD, 120 mg at 08/02/20 0839  •  labetalol (NORMODYNE) tablet 300 mg, 300 mg, Oral, TID, Jake Gustafson MD, 300 mg at 08/02/20 0839  •  magnesium hydroxide (MILK OF MAGNESIA) suspension 2400 mg/10mL 10 mL, 10 mL, Oral, Daily PRN, Jake Gustafsonaz, MD  •  montelukast (SINGULAIR) tablet 10 mg, 10 mg, Oral, Nightly, Jake Gustafson MD, 10 mg at 08/01/20 2113  •  niCARdipine (CARDENE) 25 mg in 250 mL NS (0.1 mg/mL) infusion, 5-15 mg/hr, Intravenous, Titrated, Jaek Gustafson MD, Last Rate: 100 mL/hr at 08/02/20 0913, 10 mg/hr at 08/02/20 0913  •  nitroglycerin (TRIDIL) 200 mcg/ml infusion, 5-200 mcg/min, Intravenous, Titrated, Jake Gustafson MD, Stopped at 08/01/20 1758  •  ondansetron (ZOFRAN) tablet 4 mg, 4 mg, Oral, Q6H PRN **OR** ondansetron (ZOFRAN) injection 4 mg, 4 mg, Intravenous, Q6H  ELIZABETH, Jake Gustafson MD  •  sodium chloride 0.9 % flush 10 mL, 10 mL, Intravenous, Q12H, Jake Gustafson MD, 10 mL at 08/01/20 2114  •  sodium chloride 0.9 % flush 10 mL, 10 mL, Intravenous, PRN, Jake Gustafson MD  •  sodium chloride 0.9 % flush 10 mL, 10 mL, Intravenous, Q12H, Jake Gustafson MD, 10 mL at 08/02/20 0840  •  sodium chloride 0.9 % flush 10 mL, 10 mL, Intravenous, PRN, Jake Gustafson MD  •  tamsulosin (FLOMAX) 24 hr capsule 0.4 mg, 0.4 mg, Oral, Nightly, Jake Gustafson MD, 0.4 mg at 08/01/20 2113    Review of Systems   Constitutional: Negative for diaphoresis and fatigue.   HENT: Negative for congestion and nosebleeds.    Respiratory: Positive for chest tightness and shortness of breath.    Cardiovascular: Positive for chest pain. Negative for palpitations.   Gastrointestinal: Negative for abdominal pain and blood in stool.   Genitourinary: Negative for dysuria and hematuria.   Musculoskeletal: Negative for arthralgias and back pain.   Skin: Negative for color change and pallor.   Neurological: Negative for dizziness and seizures.   Hematological: Negative for adenopathy. Does not bruise/bleed easily.   Psychiatric/Behavioral: Negative for agitation and behavioral problems.         Objective      Vital Signs  Temp:  [97.9 °F (36.6 °C)-98.9 °F (37.2 °C)] 98.7 °F (37.1 °C)  Heart Rate:  [] 93  Resp:  [12-26] 18  BP: (120-245)/() 157/93  Body mass index is 54.49 kg/m².    Intake/Output Summary (Last 24 hours) at 8/2/2020 0949  Last data filed at 8/2/2020 0904  Gross per 24 hour   Intake 1783.11 ml   Output 4300 ml   Net -2516.89 ml       Physical Exam   Constitutional: He is oriented to person, place, and time. He appears well-developed and well-nourished. No distress.   HENT:   Head: Normocephalic and atraumatic.   Eyes: Pupils are equal, round, and reactive to light. Right eye exhibits no discharge. Left eye exhibits no discharge.   Neck: No JVD present. No  tracheal deviation present.   Cardiovascular: Normal rate and regular rhythm.   Pulmonary/Chest: Effort normal and breath sounds normal.   Abdominal: Soft. Bowel sounds are normal.   Musculoskeletal: He exhibits no edema or deformity.   Neurological: He is alert and oriented to person, place, and time.   Skin: Skin is warm and dry. He is not diaphoretic.   Psychiatric: He has a normal mood and affect. His behavior is normal.         Results Review:   I reviewed the patient's new clinical results.  Results from last 7 days   Lab Units 08/01/20  1909 08/01/20  1528   CK TOTAL U/L  --  315*   TROPONIN T ng/mL 0.049* 0.059*   CKMB ng/mL  --  8.78     Results from last 7 days   Lab Units 08/01/20  1528   WBC 10*3/mm3 5.77   HEMOGLOBIN g/dL 12.7*   PLATELETS 10*3/mm3 190     Results from last 7 days   Lab Units 08/01/20  1528   SODIUM mmol/L 141   POTASSIUM mmol/L 4.0   CHLORIDE mmol/L 103   CO2 mmol/L 24.7   BUN mg/dL 37*   CREATININE mg/dL 2.41*   CALCIUM mg/dL 8.6   GLUCOSE mg/dL 80   ALT (SGPT) U/L 22   AST (SGOT) U/L 21     Lab Results   Component Value Date    INR 0.91 08/01/2020     Lab Results   Component Value Date    MG 2.0 08/01/2020     Lab Results   Component Value Date    TSH 1.670 08/01/2020    CHLPL 167 12/23/2015    TRIG 65 08/01/2020    HDL 54 08/01/2020    LDL 86 08/01/2020      Lab Results   Component Value Date     (H) 12/21/2015         Imaging Results (Last 72 Hours)     Procedure Component Value Units Date/Time    NM Lung Scan Perfusion Particulate [[id number]] Collected:  08/01/20 2000     Updated:  08/01/20 2002    Narrative:       NM Pulm Perf Imaging    HISTORY:  45-year-old male with chest pain, shortness of breath, and elevated d-dimer today.    DOSE:  *  4 mCi Tc99m MAA.    COMPARISON:   Correlation made to chest x-ray 8/1/2020.    FINDINGS:   Perfusion only imaging:  There is uniform distribution of radiotracer throughout both lungs.       Impression:       No focal perfusion defects.  Low probability for pulmonary vascular occlusive disease.    Signer Name: Isidoro Aldrich MD   Signed: 8/1/2020 8:00 PM   [email or web address]    [facility]    US Venous Doppler Lower Extremity Bilateral (duplex) [[id number]] Collected:  08/01/20 1709     Updated:  08/01/20 1711    Narrative:       PROCEDURE: US Veins LE Duplex BILAT    COMPARISON: No comparison study or studies for correlation purposes available at time of dictation.     INDICATIONS: rule out DVT, elevated d-dimer patient has a 2 year history of swelling in both lower extremities    FINDINGS:  Gray-scale, color flow and Duplex spectral waveform analysis and interrogation of bilateral lower extremity venous structures is performed with augmentation if possible..   Common femoral, deep femoral, superficial femoral, popliteal, peroneal and posterior tibial veins interrogated by department protocol.    These interrogated vessels demonstrate normal compressibility, color flow and augmentation upon distal calf compression. There is no evidence of deep venous thrombosis in either leg.      Impression:       No evidence of DVT in either lower extremity.    Signer Name: Ailyn Shea MD   Signed: 8/1/2020 5:09 PM   Workstation Name: [facility]    [facility]    XR Chest 1 View [[id number]] Collected:  08/01/20 1554     Updated:  08/01/20 1612    Narrative:       EXAMINATION: XR CHEST 1 VW-      CLINICAL INDICATION:     chest pain, port position assessment     TECHNIQUE: Single AP view of chest.      COMPARISON: EXAMINATION: XR CHEST 1 VW-      CLINICAL INDICATION:     chest pain, port position assessment     TECHNIQUE:  XR CHEST 1 VW-      COMPARISON: NONE      FINDINGS:   The lungs remain aerated.  Heart and mediastinum contours are unremarkable.  No pleural effusion.  No pneumothorax.   Bony and soft tissue structures are unremarkable.       Impression:       No radiographic evidence of acute  cardiac or pulmonary  disease.        FINDINGS: Right port with tip in SVC.  There is bibasilar atelectasis.   Lungs are otherwise aerated.  Support tube and lines are in unchanged position.   Heart size is stable.  No pneumothorax.   Tiny bilateral pleural effusions persist.      IMPRESSION: Stable appearance of the chest.     This report was finalized on 8/1/2020 3:54 PM by Dr. Stu Simpson MD.              Thank you very much for asking us to be involved in this patient's care.  We will follow along with you.    Allan Conner PA-C I discussed the case and plan of care of with Dr. Gilbert who reviewed, examined, and agreed with plan.     08/02/20  9:49 AM        Electronically signed by Ben Gilbert MD at 08/02/20 6698

## 2020-08-03 NOTE — PROGRESS NOTES
Discharge Planning Assessment   [place]     Patient Name: Sudarshan Keene  MRN: [id number]  Today's Date: 8/3/2020    Admit Date: 8/1/2020    Discharge Needs Assessment     Row Name 08/03/20 1030       Living Environment    Lives With  alone    Current Living Arrangements  home/apartment/condo    Primary Care Provided by  self    Provides Primary Care For  no one    Family Caregiver if Needed  child(tarun), adult;sibling(s)    Quality of Family Relationships  involved    Able to Return to Prior Arrangements  yes       Resource/Environmental Concerns    Transportation Concerns  car, none       Transition Planning    Patient/Family Anticipates Transition to  home with help/services    Transportation Anticipated  family or friend will provide       Discharge Needs Assessment    Equipment Currently Used at Home  none    Equipment Needed After Discharge  none        Discharge Plan     Row Name 08/03/20 1030       Plan    Plan  SS spoke with pt on this day. Pt lives at home alone. Pt states that he has the support of his children and sister if needed. Pt does not receive HH services. Pt has a CPAP. Pt does not have a POA or a living will. Pt's PCP is Dr. Azar. Pt plans to return home, and states that he thinks that he has transportation home. SS made him aware that he should notify his nurse, who can notify me at to of discharge if he does not have a ride. SS will follow and assist as needed.     Patient/Family in Agreement with Plan  yes          Demographic Summary     Row Name 08/03/20 1030       General Information    Admission Type  inpatient    Referral Source  nursing    Reason for Consult  discharge planning    Preferred Language  English     Used During This Interaction  no          SABIHA Bland

## 2020-08-03 NOTE — NURSING NOTE
Patient arrived to the floor in no acute distress.  Upon arrival his mattress would not inflate.  [employer] was contacted and issue was resolved. Patient settled into room with no complaints.

## 2020-08-04 VITALS
TEMPERATURE: 98.2 F | WEIGHT: 315 LBS | HEART RATE: 88 BPM | DIASTOLIC BLOOD PRESSURE: 91 MMHG | RESPIRATION RATE: 20 BRPM | HEIGHT: 69 IN | SYSTOLIC BLOOD PRESSURE: 148 MMHG | OXYGEN SATURATION: 99 % | BODY MASS INDEX: 46.65 KG/M2

## 2020-08-04 LAB
ANION GAP SERPL CALCULATED.3IONS-SCNC: 10.6 MMOL/L (ref 5–15)
BUN SERPL-MCNC: 32 MG/DL (ref 6–20)
BUN/CREAT SERPL: 12.2 (ref 7–25)
CALCIUM SPEC-SCNC: 8.7 MG/DL (ref 8.6–10.5)
CHLORIDE SERPL-SCNC: 105 MMOL/L (ref 98–107)
CO2 SERPL-SCNC: 22.4 MMOL/L (ref 22–29)
CREAT SERPL-MCNC: 2.62 MG/DL (ref 0.76–1.27)
CREAT UR-MCNC: 90.2 MG/DL
GFR SERPL CREATININE-BSD FRML MDRD: 32 ML/MIN/1.73
GLUCOSE BLDC GLUCOMTR-MCNC: 108 MG/DL (ref 70–130)
GLUCOSE SERPL-MCNC: 107 MG/DL (ref 65–99)
POTASSIUM SERPL-SCNC: 4.3 MMOL/L (ref 3.5–5.2)
PROT UR-MCNC: 177 MG/DL
PROT/CREAT UR: 1962.3 MG/G CREA (ref 0–200)
SODIUM SERPL-SCNC: 138 MMOL/L (ref 136–145)

## 2020-08-04 PROCEDURE — 94660 CPAP INITIATION&MGMT: CPT

## 2020-08-04 PROCEDURE — 82962 GLUCOSE BLOOD TEST: CPT

## 2020-08-04 PROCEDURE — 80048 BASIC METABOLIC PNL TOTAL CA: CPT | Performed by: HOSPITALIST

## 2020-08-04 PROCEDURE — 99239 HOSP IP/OBS DSCHRG MGMT >30: CPT | Performed by: HOSPITALIST

## 2020-08-04 PROCEDURE — 94799 UNLISTED PULMONARY SVC/PX: CPT

## 2020-08-04 PROCEDURE — 25010000002 HEPARIN (PORCINE) PER 1000 UNITS: Performed by: HOSPITALIST

## 2020-08-04 RX ORDER — HYDRALAZINE HYDROCHLORIDE 50 MG/1
75 TABLET, FILM COATED ORAL 3 TIMES DAILY
Qty: 135 TABLET | Refills: 0 | Status: SHIPPED | OUTPATIENT
Start: 2020-08-04 | End: 2020-09-03

## 2020-08-04 RX ORDER — BUMETANIDE 1 MG/1
1 TABLET ORAL DAILY
Start: 2020-08-09

## 2020-08-04 RX ORDER — AMLODIPINE BESYLATE 10 MG/1
10 TABLET ORAL
Qty: 30 TABLET | Refills: 0 | Status: SHIPPED | OUTPATIENT
Start: 2020-08-04 | End: 2020-09-03

## 2020-08-04 RX ADMIN — HYDRALAZINE HYDROCHLORIDE 75 MG: 50 TABLET, FILM COATED ORAL at 05:49

## 2020-08-04 RX ADMIN — ISOSORBIDE MONONITRATE 120 MG: 60 TABLET ORAL at 09:05

## 2020-08-04 RX ADMIN — LABETALOL HYDROCHLORIDE 300 MG: 100 TABLET, FILM COATED ORAL at 09:06

## 2020-08-04 RX ADMIN — IPRATROPIUM BROMIDE AND ALBUTEROL SULFATE 3 ML: .5; 3 SOLUTION RESPIRATORY (INHALATION) at 00:41

## 2020-08-04 RX ADMIN — BUPROPION HYDROCHLORIDE 200 MG: 100 TABLET, EXTENDED RELEASE ORAL at 09:05

## 2020-08-04 RX ADMIN — IPRATROPIUM BROMIDE AND ALBUTEROL SULFATE 3 ML: .5; 3 SOLUTION RESPIRATORY (INHALATION) at 06:55

## 2020-08-04 RX ADMIN — SODIUM CHLORIDE 1000 ML: 9 INJECTION, SOLUTION INTRAVENOUS at 09:25

## 2020-08-04 RX ADMIN — SODIUM CHLORIDE, PRESERVATIVE FREE 10 ML: 5 INJECTION INTRAVENOUS at 09:14

## 2020-08-04 RX ADMIN — CLONIDINE HYDROCHLORIDE 0.2 MG: 0.2 TABLET ORAL at 09:05

## 2020-08-04 RX ADMIN — SODIUM CHLORIDE, PRESERVATIVE FREE 10 ML: 5 INJECTION INTRAVENOUS at 09:11

## 2020-08-04 RX ADMIN — HEPARIN SODIUM 5000 UNITS: 5000 INJECTION INTRAVENOUS; SUBCUTANEOUS at 05:49

## 2020-08-04 RX ADMIN — ASPIRIN 81 MG: 81 TABLET, COATED ORAL at 09:06

## 2020-08-04 RX ADMIN — AMLODIPINE BESYLATE 10 MG: 10 TABLET ORAL at 09:05

## 2020-08-04 RX ADMIN — SODIUM CHLORIDE, PRESERVATIVE FREE 10 ML: 5 INJECTION INTRAVENOUS at 09:05

## 2020-08-04 NOTE — DISCHARGE SUMMARY
[facility]    Patient Identification:  Name:  Sudarshan Keene  Age:  45 y.o.  Sex:  male  :  1975  MRN:  [id number]  Visit Number:  [id number]    Date of Admission: 2020  Date of Discharge:  2020     PCP: Provider, No Known      DISCHARGE DIAGNOSIS  HTN emergency  Hypertensive heart disease  Elevated troponin  CKD stage 3  Elevated D-dimer with low probability VQ scan  COPD  KATHLEEN  Adjustment disorder with history of panic attacks  Substance abuse with + UDS for THC  Morbid Obesity  Medical Noncompliance    CONSULTS   None    PROCEDURES PERFORMED  None    HOSPITAL COURSE  Mr. eKene is a 45 y.o. male with PMH significant for nonobstructive coronary artery disease (patient had a heart cath in  but no stents were placed), CHF, COPD, uncontrolled hypertension, sleep apnea and arthritis presented to the [facility] ED with chest pain.  Patient was found to have systolic blood pressure greater than 200.  He was also complaining of chest pain and troponins were found to be elevated so the patient was transferred to our facility for further evaluation and management.  Please see the admitting history and physical for further details.   Admitted with hypertensive emergency with /134 and elevated troponin.  Started on nitroglycerin gtt and nicardipine gtt.  Cardiology consulted. NTG gtt weaned off within 12 hrs and Nicardipine gtt weaned off on 08/3 am. Started on amlodipine and hydralazine dose increased. Continued on home clonidine and labetalol. Home losartan held and than discontinued at the time of discharged. Home Bumex held during the hospital stay and resumed on . Patient had troponin elevation which is trending down.  2D echocardiogram was done which showed EF of 51 to 55% and LV wall thickness consistent with severe concentric hypertrophy.  Grade 2 diastolic dysfunction.  Cardiology consulted and requested records from [facility] and advised  outpatient follow-up because of no chest pain and CKD. D-dimer was elevated so VQ scan and lower extremity Doppler done.  VQ scan low probability and lower extremity Doppler negative for DVT. For CKD stage 3, nephrology consulted. UA with mild proteinuria. Urine sodium is high. Renal US normal.    Renal function was slightly worse with creatinine trending up to 2.6 from 2.3.  and nephrology wanted to monitor the patient but patient did not want to stay anymore.  Received IV fluids per nephrology.  Appointment with Dr. Irby made in 10 days with a repeat BMP to be done 2 days prior. Home Bumex to be resumed on 08/9 per Dr. Lala.     Regarding Medical Noncompliance, strictly advised regarding diet and the medication compliance.  Advised regarding low-salt diet and maintaining a logbook of blood pressure recording 3 times.  Patient mentioned that he has a blood pressure machine at home.  Patient continued to improve symptomatically and remained vitally stable and afebrile.  Since patient was vitally stable and improved medically and cleared by nephrology, it was decided discharge patient to home.  Discharge disposition stable.         VITAL SIGNS:  Temp:  [97.7 °F (36.5 °C)-98.2 °F (36.8 °C)] 98.2 °F (36.8 °C)  Heart Rate:  [79-90] 88  Resp:  [18-22] 20  BP: (136-148)/(76-91) 148/91  SpO2:  [98 %-99 %] 99 %  on   ;   Device (Oxygen Therapy): room air    Body mass index is 54.15 kg/m².  Wt Readings from Last 3 Encounters:   08/04/20 (!) 166 kg (366 lb 11.2 oz)       PHYSICAL EXAM:  General: Comfortable, Not in distress.  Well-developed and well-nourished.   HENT:  Head:  Normocephalic and atraumatic.  Mouth:  Moist mucous membranes.    Eyes:  Conjunctivae and EOM are normal.  Pupils are equal, round, and reactive to light.  No scleral icterus.    Neck:  Neck supple.  No JVD present.  trachea midline.   Cardiovascular:  Normal rate, regular rhythm with no murmur.  Pulmonary/Chest:  No respiratory distress, no wheezes, no  crackles, with coarse breath sounds and good air movement.  Abdomen:  Soft.  Bowel sounds are normal.  No distension and no tenderness.   Musculoskeletal:  no tenderness, and no deformity.  No red or swollen joints anywhere.    Neurological:  Alert and oriented to person, place, and time.  No cranial nerve deficit. No focal deficits. No facial droop.  No slurred speech.   Skin:  Skin is warm and dry. No rash noted. No pallor.   Peripheral vascular: pulses in all 4 extremities with no clubbing, no cyanosis, no edema.  Genitourinary: no fajardo    DISCHARGE DISPOSITION   Home    DISCHARGE MEDICATIONS:     Discharge Medications      New Medications      Instructions Start Date   amLODIPine 10 MG tablet  Commonly known as:  NORVASC   10 mg, Oral, Every 24 Hours Scheduled         Changes to Medications      Instructions Start Date   bumetanide 1 MG tablet  Commonly known as:  BUMEX  What changed:  These instructions start on August 9, 2020. If you are unsure what to do until then, ask your doctor or other care provider.   1 mg, Oral, Daily   Start Date:  August 9, 2020     hydrALAZINE 50 MG tablet  Commonly known as:  APRESOLINE  What changed:  how much to take   75 mg, Oral, 3 Times Daily         Continue These Medications      Instructions Start Date   albuterol sulfate  (90 Base) MCG/ACT inhaler  Commonly known as:  PROVENTIL HFA;VENTOLIN HFA;PROAIR HFA   2 puffs, Inhalation, Every 4 Hours PRN      albuterol (2.5 MG/3ML) 0.083% nebulizer solution  Commonly known as:  PROVENTIL   2.5 mg, Nebulization, Every 4 Hours PRN      ALPRAZolam 1 MG tablet  Commonly known as:  XANAX   1 mg, Oral, 2 Times Daily PRN      aspirin 81 MG EC tablet   81 mg, Oral, Daily      atorvastatin 40 MG tablet  Commonly known as:  LIPITOR   40 mg, Oral, Nightly      buPROPion  MG 12 hr tablet  Commonly known as:  WELLBUTRIN SR   200 mg, Oral, Daily      cloNIDine 0.2 MG tablet  Commonly known as:  CATAPRES   0.2 mg, Oral, 3 Times  Daily      isosorbide mononitrate 120 MG 24 hr tablet  Commonly known as:  IMDUR   120 mg, Oral, Daily      labetalol 300 MG tablet  Commonly known as:  NORMODYNE   300 mg, Oral, 3 Times Daily      montelukast 10 MG tablet  Commonly known as:  SINGULAIR   10 mg, Oral, Nightly      tamsulosin 0.4 MG capsule 24 hr capsule  Commonly known as:  FLOMAX   1 capsule, Oral, Nightly      tiotropium 18 MCG per inhalation capsule  Commonly known as:  SPIRIVA   1 capsule, Inhalation, Daily - RT         Stop These Medications    losartan 25 MG tablet  Commonly known as:  COZAAR            Diet Instructions     Diet: Cardiac, Renal      Discharge Diet:   Cardiac  Renal       Low Sodium diet        Activity Instructions     Activity as Tolerated          No future appointments.  Your Scheduled Appointments     Pt  Has  An  Apt  With  Dr meléndez  For  augdominic  For  auguest 7  t  9 :30  And  Dr oneill  For  auguest 7 at  3  pm             Additional Instructions for the Follow-ups that You Need to Schedule     Discharge Follow-up with PCP   As directed       Currently Documented PCP:    Provider, No Known    PCP Phone Number:    [phone]     Follow Up Details:  Within 1 week         Discharge Follow-up with Specialty: Nephrology Dr. Irby in 10 days   As directed      Specialty:  Nephrology Dr. Irby in 10 days    Follow Up Details:  CKD         Discharge Follow-up with Specified Provider: Cardiology Dr. Gilbert; 2 Weeks   As directed      To:  Cardiology Dr. Gilbert    Follow Up:  2 Weeks         Basic Metabolic Panel    Aug 12, 2020 (Approximate)      Fax report to Dr. Irby    Order Comments:  Fax report to Dr. Irby            Follow-up Information     Provider, No Known .    Why:  Within 1 week  Contact information:  [facility] [address] [phone]             Provider, No Known .    Why:  Within 1 week  Contact information:  [facility] [address] [phone]              Provider, No Known .    Contact information:  [facility] [address]  [phone]             Provider, No Known .    Contact information:  [facility] [address] [phone]                    TEST  RESULTS PENDING AT DISCHARGE       CODE STATUS  Code Status and Medical Interventions:   Ordered at: 08/01/20 1459     Code Status:    CPR     Medical Interventions (Level of Support Prior to Arrest):    Full       Natalie Gibbons MD  08/04/20  17:47     Time: I spent  35  minutes on this discharge activity which included: face-to-face encounter with the patient, reviewing the data in the system, coordination of the care with the nursing staff as well as consultants, documentation, and entering orders.        Please note that this discharge summary required more than 30 minutes to complete.    Please send a copy of this dictation to the following providers:  Provider, No Known

## 2020-08-04 NOTE — PROGRESS NOTES
Discharge Planning Assessment   [place]     Patient Name: Sudarshan Keene  MRN: [id number]  Today's Date: 8/4/2020    Admit Date: 8/1/2020        Discharge Plan     Row Name 08/04/20 1033       Plan    Final Discharge Disposition Code  01 - home or self-care    Final Note  Pt to be discharged home on this date.           JUSTIN SantiagoW

## 2020-08-04 NOTE — ACP (ADVANCE CARE PLANNING)
Today Mr. Sudarshan Keene completed a Living Will naming his fiance, Sharri Boland, as his Healthcare Surrogate.  The document was signed, notarized, and charted.

## 2020-08-04 NOTE — PROGRESS NOTES
Nephrology Progress Note      Subjective     Patient wants to go home and can not stay any longer, he admitted eating lots of salt and promised to quit after discharge.     Objective       Vital signs :     Temp:  [97.5 °F (36.4 °C)-98.3 °F (36.8 °C)] 98 °F (36.7 °C)  Heart Rate:  [64-90] 88  Resp:  [10-26] 22  BP: (133-176)/() 136/86      Intake/Output Summary (Last 24 hours) at 8/4/2020 0748  Last data filed at 8/4/2020 0500  Gross per 24 hour   Intake 1265.37 ml   Output 1450 ml   Net -184.63 ml       Physical Exam:    General Appearance : Not in acute distress  Lungs : clear to auscultation, respirations regular  Heart :  regular rhythm & normal rate, normal S1, S2 and no murmur, no rub  Abdomen : normal bowel sounds, no masses, no hepatomegaly, no splenomegaly, soft non-tender and no guarding  Extremities : moves extremities well, no edema, no cyanosis and no redness  Skin :  no bleeding, bruising or rash  Neurologic :   orientated to person, place, time and situation, Grossly no focal deficits  Acess :       Laboratory Data :     Albumin Albumin   Date Value Ref Range Status   08/03/2020 3.20 (L) 3.50 - 5.20 g/dL Final   08/01/2020 3.54 3.50 - 5.20 g/dL Final   08/01/2020 3.40 (L) 3.50 - 5.20 g/dL Final      Magnesium Magnesium   Date Value Ref Range Status   08/01/2020 2.0 1.6 - 2.6 mg/dL Final          PTH               No results found for: PTH    CBC and coagulation:  Results from last 7 days   Lab Units 08/02/20  1616 08/01/20  1529 08/01/20  1528   CRP mg/dL  --   --  1.54*   WBC 10*3/mm3 5.45  --  5.77   HEMOGLOBIN g/dL 11.7*  --  12.7*   HEMATOCRIT % 38.2  --  41.3   MCV fL 83.0  --  84.6   MCHC g/dL 30.6*  --  30.8*   PLATELETS 10*3/mm3 183  --  190   INR   --  0.91  --    D DIMER QUANT MCGFEU/mL  --  2.35*  --      Acid/base balance:  Results from last 7 days   Lab Units 08/01/20  1700   PH, ARTERIAL pH units 7.358   PO2 ART mm Hg 63.3*   PCO2, ARTERIAL mm Hg 45.5*   HCO3 ART mmol/L 25.6      Renal and electrolytes:  Results from last 7 days   Lab Units 08/04/20  0217 08/03/20  0046 08/01/20  1909 08/01/20  1529 08/01/20  1528   SODIUM mmol/L 138 139 143  --  141   POTASSIUM mmol/L 4.3 3.9 3.8  --  4.0   MAGNESIUM mg/dL  --   --   --   --  2.0   CHLORIDE mmol/L 105 105 105  --  103   CO2 mmol/L 22.4 24.6 15.9*  --  24.7   BUN mg/dL 32* 29* 35*  --  37*   CREATININE mg/dL 2.62* 2.34* 2.36*  --  2.41*   EGFR IF AFRICN AM mL/min/1.73 32* 37* 36*  --  35*   CALCIUM mg/dL 8.7 8.5* 8.8  --  8.6   IONIZED CALCIUM mmol/L  --   --   --  1.21  --      Estimated Creatinine Clearance: 54.9 mL/min (A) (by C-G formula based on SCr of 2.62 mg/dL (H)).    Liver and pancreatic function:  Results from last 7 days   Lab Units 08/03/20  0046 08/01/20 1909 08/01/20  1528   ALBUMIN g/dL 3.20* 3.54 3.40*   BILIRUBIN mg/dL 0.2 0.2 0.2   ALK PHOS U/L 65 76 72   AST (SGOT) U/L 16 23 21   ALT (SGPT) U/L 18 24 22         Cardiac:  Results from last 7 days   Lab Units 08/01/20  1528   PROBNP pg/mL 4,549.0*     Liver and pancreatic function:  Results from last 7 days   Lab Units 08/03/20  0046 08/01/20 1909 08/01/20  1528   ALBUMIN g/dL 3.20* 3.54 3.40*   BILIRUBIN mg/dL 0.2 0.2 0.2   ALK PHOS U/L 65 76 72   AST (SGOT) U/L 16 23 21   ALT (SGPT) U/L 18 24 22       Medications :       amLODIPine 10 mg Oral Q24H   aspirin 81 mg Oral Daily   atorvastatin 40 mg Oral Nightly   buPROPion  mg Oral Daily   cloNIDine 0.2 mg Oral TID   heparin (porcine) 5,000 Units Subcutaneous Q8H   hydrALAZINE 75 mg Oral Q8H   ipratropium-albuterol 3 mL Nebulization 4x Daily - RT   isosorbide mononitrate 120 mg Oral Daily   labetalol 300 mg Oral TID   montelukast 10 mg Oral Nightly   sodium chloride 10 mL Intravenous Q12H   sodium chloride 10 mL Intravenous Q12H   sodium chloride 10 mL Intravenous Q12H   sodium chloride 10 mL Intravenous Q12H   sodium chloride 10 mL Intravenous Q12H   tamsulosin 0.4 mg Oral Nightly            Assessment/Plan      1. Hypertensive urgency  2. CKD  3. COPD  4. Hypertensive heart disease     Cr is slightly up today to 2.6 from 2.3, likely due to hemodynamic changes  Pt does not want to stay in hospital and would like to follow up out patient.   He understood the risk of further worsening renal functions and verbalized appropriately. Will make apt with Dr. Boyle in 10 days with BMP.   Educated and counseled to reduce salt intake, he admitted eating too much of fast food and is going to quit. To resume   Lasix in 3 days    CKD G3bA1 likely 2/2 hypertensive ischemic nephrosclerosis     Unknown baseline Cr, admitted with 2.4 ->2.3  No hematuria, 100mg/dl proteinuria  Sono unremarkable  Nneka is elevated likely daily intake of sodium is very high and likely underlying etiology for uncontrolled hypertension      Jake Lala MD  08/04/20  07:48

## 2020-08-05 ENCOUNTER — READMISSION MANAGEMENT (OUTPATIENT)
Dept: CALL CENTER | Facility: HOSPITAL | Age: 45
End: 2020-08-05

## 2020-08-05 NOTE — PAYOR COMM NOTE
"[facility]  NPI: [id number]    Utilization Review   Contact:Candida Reynolds MSN, APRN, FNP- BC  Phone: [phone]  Fax: [phone]    Madisyn mr/ attn: nurse review  Discharge Notification  REF#   [id number]  D/c home on 8-4  Awaiting auth # and determination    JassiElisha russo (45 y.o. Male)     Date of Birth Social Security Number Address Home Phone MRN    1975  [address] [facility] [address] [phone] [id number]    [employer] Marital Status          None Single       Admission Date Admission Type Admitting Provider Attending Provider Department, Room/Bed    8/1/20 Urgent Jake Gustafson MD  [address], [address]/    Discharge Date Discharge Disposition Discharge Destination        8/4/2020 Home or Self Care              Attending Provider:  (none)   Allergies:  No Known Allergies    Isolation:  None   Infection:  None   Code Status:  Prior    Ht:  175.3 cm (69\")   Wt:  166 kg (366 lb 11.2 oz)    Admission Cmt:  None   Principal Problem:  None                Active Insurance as of 8/1/2020     Primary Coverage     Payor Plan Insurance Group Employer/Plan Group    ANTHEM MEDICARE REPLACEMENT ANTHEM MEDICARE ADVANTAGE [id number]     Payor Plan Address Payor Plan Phone Number Payor Plan Fax Number Effective Dates    [address] [phone]  1/1/2020 - None Entered    [facility] [address]       Subscriber Name Subscriber Birth Date Member ID       ELISHA CHAVEZ 1975 [id number]           Secondary Coverage     Payor Plan Insurance Group Employer/Plan Group    [facility] MEDICAID [id number]     Payor Plan Address Payor Plan Phone Number Payor Plan Fax Number Effective Dates    [address] [address] [phone]  8/1/2020 - None Entered    [facility] [id number]       Subscriber Name Subscriber Birth Date Member ID       ELISHA CHAVEZ 1975 [id number]                 Emergency Contacts      (Rel.) Home Phone Work Phone Mobile Phone    " LUIHUAN CARMONA (Other) [phone] -- --    holland caruso (Mother) -- -- [phone]               Discharge Summary      Natalie Gibbons MD at 20 1021              [facility] DISCHARGE SUMMARY    Patient Identification:  Name:  Sudarshan Keene  Age:  45 y.o.  Sex:  male  :  1975  MRN:  [id number]  Visit Number:  [id number]    Date of Admission: 2020  Date of Discharge:  2020     PCP: Provider, No Known      DISCHARGE DIAGNOSIS  HTN emergency  Hypertensive heart disease  Elevated troponin  CKD stage 3  Elevated D-dimer with low probability VQ scan  COPD  KATHLEEN  Adjustment disorder with history of panic attacks  Substance abuse with + UDS for THC  Morbid Obesity  Medical Noncompliance    CONSULTS   None    PROCEDURES PERFORMED  None    HOSPITAL COURSE  Mr. Keene is a 45 y.o. male with PMH significant for nonobstructive coronary artery disease (patient had a heart cath in  but no stents were placed), CHF, COPD, uncontrolled hypertension, sleep apnea and arthritis presented to the [facility] ED with chest pain.  Patient was found to have systolic blood pressure greater than 200.  He was also complaining of chest pain and troponins were found to be elevated so the patient was transferred to our facility for further evaluation and management.  Please see the admitting history and physical for further details.   Admitted with hypertensive emergency with /134 and elevated troponin.  Started on nitroglycerin gtt and nicardipine gtt.  Cardiology consulted. NTG gtt weaned off within 12 hrs and Nicardipine gtt weaned off on 08/3 am. Started on amlodipine and hydralazine dose increased. Continued on home clonidine and labetalol. Home losartan held and than discontinued at the time of discharged. Home Bumex held during the hospital stay and resumed on . Patient had troponin elevation which is trending down.  2D echocardiogram was done which showed EF of 51 to  55% and LV wall thickness consistent with severe concentric hypertrophy.  Grade 2 diastolic dysfunction.  Cardiology consulted and requested records from [facility] and advised outpatient follow-up because of no chest pain and CKD. D-dimer was elevated so VQ scan and lower extremity Doppler done.  VQ scan low probability and lower extremity Doppler negative for DVT. For CKD stage 3, nephrology consulted. UA with mild proteinuria. Urine sodium is high. Renal US normal.    Renal function was slightly worse with creatinine trending up to 2.6 from 2.3.  and nephrology wanted to monitor the patient but patient did not want to stay anymore.  Received IV fluids per nephrology.  Appointment with Dr. Irby made in 10 days with a repeat BMP to be done 2 days prior. Home Bumex to be resumed on 08/9 per Dr. Lala.     Regarding Medical Noncompliance, strictly advised regarding diet and the medication compliance.  Advised regarding low-salt diet and maintaining a logbook of blood pressure recording 3 times.  Patient mentioned that he has a blood pressure machine at home.  Patient continued to improve symptomatically and remained vitally stable and afebrile.  Since patient was vitally stable and improved medically and cleared by nephrology, it was decided discharge patient to home.  Discharge disposition stable.         VITAL SIGNS:  Temp:  [97.7 °F (36.5 °C)-98.2 °F (36.8 °C)] 98.2 °F (36.8 °C)  Heart Rate:  [79-90] 88  Resp:  [18-22] 20  BP: (136-148)/(76-91) 148/91  SpO2:  [98 %-99 %] 99 %  on   ;   Device (Oxygen Therapy): room air    Body mass index is 54.15 kg/m².  Wt Readings from Last 3 Encounters:   08/04/20 (!) 166 kg (366 lb 11.2 oz)       PHYSICAL EXAM:  General: Comfortable, Not in distress.  Well-developed and well-nourished.   HENT:  Head:  Normocephalic and atraumatic.  Mouth:  Moist mucous membranes.    Eyes:  Conjunctivae and EOM are normal.  Pupils are equal, round, and reactive to light.  No scleral icterus.    Neck:   Neck supple.  No JVD present.  trachea midline.   Cardiovascular:  Normal rate, regular rhythm with no murmur.  Pulmonary/Chest:  No respiratory distress, no wheezes, no crackles, with coarse breath sounds and good air movement.  Abdomen:  Soft.  Bowel sounds are normal.  No distension and no tenderness.   Musculoskeletal:  no tenderness, and no deformity.  No red or swollen joints anywhere.    Neurological:  Alert and oriented to person, place, and time.  No cranial nerve deficit. No focal deficits. No facial droop.  No slurred speech.   Skin:  Skin is warm and dry. No rash noted. No pallor.   Peripheral vascular: pulses in all 4 extremities with no clubbing, no cyanosis, no edema.  Genitourinary: no fajardo    DISCHARGE DISPOSITION   Home    DISCHARGE MEDICATIONS:     Discharge Medications      New Medications      Instructions Start Date   amLODIPine 10 MG tablet  Commonly known as:  NORVASC   10 mg, Oral, Every 24 Hours Scheduled         Changes to Medications      Instructions Start Date   bumetanide 1 MG tablet  Commonly known as:  BUMEX  What changed:  These instructions start on August 9, 2020. If you are unsure what to do until then, ask your doctor or other care provider.   1 mg, Oral, Daily   Start Date:  August 9, 2020     hydrALAZINE 50 MG tablet  Commonly known as:  APRESOLINE  What changed:  how much to take   75 mg, Oral, 3 Times Daily         Continue These Medications      Instructions Start Date   albuterol sulfate  (90 Base) MCG/ACT inhaler  Commonly known as:  PROVENTIL HFA;VENTOLIN HFA;PROAIR HFA   2 puffs, Inhalation, Every 4 Hours PRN      albuterol (2.5 MG/3ML) 0.083% nebulizer solution  Commonly known as:  PROVENTIL   2.5 mg, Nebulization, Every 4 Hours PRN      ALPRAZolam 1 MG tablet  Commonly known as:  XANAX   1 mg, Oral, 2 Times Daily PRN      aspirin 81 MG EC tablet   81 mg, Oral, Daily      atorvastatin 40 MG tablet  Commonly known as:  LIPITOR   40 mg, Oral, Nightly       buPROPion  MG 12 hr tablet  Commonly known as:  WELLBUTRIN SR   200 mg, Oral, Daily      cloNIDine 0.2 MG tablet  Commonly known as:  CATAPRES   0.2 mg, Oral, 3 Times Daily      isosorbide mononitrate 120 MG 24 hr tablet  Commonly known as:  IMDUR   120 mg, Oral, Daily      labetalol 300 MG tablet  Commonly known as:  NORMODYNE   300 mg, Oral, 3 Times Daily      montelukast 10 MG tablet  Commonly known as:  SINGULAIR   10 mg, Oral, Nightly      tamsulosin 0.4 MG capsule 24 hr capsule  Commonly known as:  FLOMAX   1 capsule, Oral, Nightly      tiotropium 18 MCG per inhalation capsule  Commonly known as:  SPIRIVA   1 capsule, Inhalation, Daily - RT         Stop These Medications    losartan 25 MG tablet  Commonly known as:  COZAAR            Diet Instructions     Diet: Cardiac, Renal      Discharge Diet:   Cardiac  Renal       Low Sodium diet        Activity Instructions     Activity as Tolerated          No future appointments.  Your Scheduled Appointments     Pt  Has  An  Apt  With  Dr meléndez  For  augdominic  For  auguest 7  t  9 :30  And  Dr oneill  For  auguest 7 at  3  pm             Additional Instructions for the Follow-ups that You Need to Schedule     Discharge Follow-up with PCP   As directed       Currently Documented PCP:    Provider, No Known    PCP Phone Number:    [phone]     Follow Up Details:  Within 1 week         Discharge Follow-up with Specialty: Nephrology Dr. Irby in 10 days   As directed      Specialty:  Nephrology Dr. Irby in 10 days    Follow Up Details:  CKD         Discharge Follow-up with Specified Provider: Cardiology Dr. Gilbert; 2 Weeks   As directed      To:  Cardiology Dr. Gilbert    Follow Up:  2 Weeks         Basic Metabolic Panel    Aug 12, 2020 (Approximate)      Fax report to Dr. Irby    Order Comments:  Fax report to Dr. Irby            Follow-up Information     Provider, No Known .    Why:  Within 1 week  Contact information:  [facility]  [place]  [address]  [phone]             Provider, No Known .    Why:  Within 1 week  Contact information:  [facility] [address]  [phone]             Provider, No Known .    Contact information:  [facility] [address]  [phone]             Provider, No Known .    Contact information:  [facility] [address]  [phone]                    TEST  RESULTS PENDING AT DISCHARGE       CODE STATUS  Code Status and Medical Interventions:   Ordered at: 08/01/20 1459     Code Status:    CPR     Medical Interventions (Level of Support Prior to Arrest):    Full       Natalie Gibbons MD  08/04/20  17:47     Time: I spent  35  minutes on this discharge activity which included: face-to-face encounter with the patient, reviewing the data in the system, coordination of the care with the nursing staff as well as consultants, documentation, and entering orders.        Please note that this discharge summary required more than 30 minutes to complete.    Please send a copy of this dictation to the following providers:  Provider, No Known    Electronically signed by Natalie Gibbons MD at 08/04/20 4854

## 2020-08-05 NOTE — OUTREACH NOTE
Prep Survey      Responses   [facility] facility patient discharged from?  [place]   Is LACE score < 7 ?  No   Eligibility  Readm Mgmt   Discharge diagnosis  NSTEMI,  HTN   COVID-19 Test Status  Not tested   Does the patient have one of the following disease processes/diagnoses(primary or secondary)?  Acute MI (STEMI,NSTEMI)   Does the patient have Home health ordered?  No   Is there a DME ordered?  No   Comments regarding appointments  Pt Has An Apt With Dr meléndez For auguest For auguest 7 t 9 :30 And Dr oneill For auguest 7 at 3 pm   Medication alerts for this patient  see AVS   Prep survey completed?  Yes          Elsa Keen RN

## 2020-08-11 ENCOUNTER — READMISSION MANAGEMENT (OUTPATIENT)
Dept: CALL CENTER | Facility: HOSPITAL | Age: 45
End: 2020-08-11

## 2020-08-11 NOTE — OUTREACH NOTE
AMI Week 1 Survey      Responses   [facility] patient discharged from?  [place]   Does the patient have one of the following disease processes/diagnoses(primary or secondary)?  Acute MI (STEMI,NSTEMI)   Is there a successful TCM telephone encounter documented?  No   Week 1 attempt successful?  No   Unsuccessful attempts  Attempt 1          Delphine Martinez LPN

## 2020-08-12 ENCOUNTER — READMISSION MANAGEMENT (OUTPATIENT)
Dept: CALL CENTER | Facility: HOSPITAL | Age: 45
End: 2020-08-12

## 2020-08-12 NOTE — OUTREACH NOTE
AMI Week 1 Survey      Responses   [facility] from[facility]  [place]   Does the patient have one of the following disease processes/diagnoses(primary or secondary)?  Acute MI (STEMI,NSTEMI)   Is there a successful TCM telephone encounter documented?  No   Week 1 attempt successful?  Yes   Call start time  1318   Call end time  1323   Discharge diagnosis  NSTEMI,  HTN   Meds reviewed with patient/caregiver?  Yes   Is the patient having any side effects they believe may be caused by any medication additions or changes?  No   Does the patient have all prescriptions related to this admission filled (includes statins,anticoagulants,HTN meds,anti-arrhythmia meds)  Yes   Is the patient taking all medications as directed (includes completed medication regime)?  Yes   Does the patient have a primary care provider?   Yes   Does the patient have an appointment with their PCP,cardiologist,or clinic within 7 days of discharge?  Yes   Has the patient kept scheduled appointments due by today?  Yes   DME comments  he has a CPAP at home but does not use. The other night he used it the whole night.    Psychosocial issues?  No   Comments  Pt reports /90 he normally runs 190/108. The other night he got a reading of 116/52   Did the patient receive a copy of their discharge instructions?  Yes   Nursing interventions  Reviewed instructions with patient   What is the patient's perception of their health status since discharge?  Improving   Is the patient/caregiver able to teach back signs and symptoms of when to call for help immediately:  Sudden chest discomfort, Sudden discomfort in arms, back, neck or jaw, Nausea or vomiting, Sudden sweating or clammy skin   Is the patient/caregiver able to teach back lifestyle changes to help prevent MIs  Quit smoking, Heart healthy diet   Is the patient/caregiver able to teach back ways to prevent a second heart attack:  Take medications, Follow up with MD   Is the  patient/caregiver able to teach back the hierarchy of who to call/visit for symptoms/problems? PCP, Specialist, Home health nurse, Urgent Care, ED, 911  Yes   Week 1 call completed?  Yes          Starla Castaneda RN

## 2020-08-18 ENCOUNTER — READMISSION MANAGEMENT (OUTPATIENT)
Dept: CALL CENTER | Facility: HOSPITAL | Age: 45
End: 2020-08-18

## 2020-08-18 NOTE — OUTREACH NOTE
AMI Week 2 Survey      Responses   [facility] patient discharged from?  [place]   Does the patient have one of the following disease processes/diagnoses(primary or secondary)?  Acute MI (STEMI,NSTEMI)   Week 2 attempt successful?  No   Unsuccessful attempts  Attempt 1          Lety Bautista RN

## 2020-08-19 ENCOUNTER — READMISSION MANAGEMENT (OUTPATIENT)
Dept: CALL CENTER | Facility: HOSPITAL | Age: 45
End: 2020-08-19

## 2020-08-19 NOTE — OUTREACH NOTE
AMI Week 2 Survey      Responses   [facility] facility patient discharged from?  [place]   Does the patient have one of the following disease processes/diagnoses(primary or secondary)?  Acute MI (STEMI,NSTEMI)   Week 2 attempt successful?  No   Unsuccessful attempts  Attempt 2          Lety Bautista RN

## 2020-08-26 ENCOUNTER — READMISSION MANAGEMENT (OUTPATIENT)
Dept: CALL CENTER | Facility: HOSPITAL | Age: 45
End: 2020-08-26

## 2020-08-26 NOTE — OUTREACH NOTE
AMI Week 3 Survey      Responses   [facility] patient discharged from?  [place]   Does the patient have one of the following disease processes/diagnoses(primary or secondary)?  Acute MI (STEMI,NSTEMI)   Week 3 attempt successful?  Yes   Call start time  1544   Call end time  1550   Discharge diagnosis  NSTEMI,  HTN   Person spoke with today (if not patient) and relationship  Sharri-other   Meds reviewed with patient/caregiver?  Yes   Is the patient taking all medications as directed (includes completed medication regime)?  Yes   Does the patient have a primary care provider?   Yes   Comments regarding PCP  Updated PCP in [facility]   Has the patient kept scheduled appointments due by today?  Yes   Psychosocial issues?  No   Comments  Pt is wearing his CPAP at night    What is the patient's perception of their health status since discharge?  Improving   Nursing interventions  Nurse provided patient education   Is the patient/caregiver able to teach back signs and symptoms of when to call for help immediately:  Sudden chest discomfort, Sudden discomfort in arms, back, neck or jaw   Nursing interventions  Nurse provided patient education   Is the patient/caregiver able to teach back lifestyle changes to help prevent MIs  Limiting alcohol intake, Managing diabetes, Quit smoking [Not a diabetic]   Is the patient/caregiver able to teach back ways to prevent a second heart attack:  Take medications, Follow up with MD   If the patient is a current smoker, are they able to teach back resources for cessation?  -- [Smoker marijuana ]   Week 3 call completed?  Yes          Isela Summers RN

## 2021-04-16 ENCOUNTER — HOSPITAL ENCOUNTER (INPATIENT)
Dept: HOSPITAL 79 - PROG CARE | Age: 46
LOS: 4 days | Discharge: HOME | DRG: 305 | End: 2021-04-20
Attending: INTERNAL MEDICINE | Admitting: INTERNAL MEDICINE
Payer: MEDICARE

## 2021-04-16 VITALS — WEIGHT: 315 LBS | BODY MASS INDEX: 46.65 KG/M2 | HEIGHT: 69 IN

## 2021-04-16 DIAGNOSIS — D50.9: ICD-10-CM

## 2021-04-16 DIAGNOSIS — I16.0: Primary | ICD-10-CM

## 2021-04-16 DIAGNOSIS — N18.4: ICD-10-CM

## 2021-04-16 DIAGNOSIS — Z79.899: ICD-10-CM

## 2021-04-16 DIAGNOSIS — E78.5: ICD-10-CM

## 2021-04-16 DIAGNOSIS — Z86.711: ICD-10-CM

## 2021-04-16 DIAGNOSIS — R07.89: ICD-10-CM

## 2021-04-16 DIAGNOSIS — Z79.52: ICD-10-CM

## 2021-04-16 DIAGNOSIS — I48.0: ICD-10-CM

## 2021-04-16 DIAGNOSIS — I13.0: ICD-10-CM

## 2021-04-16 DIAGNOSIS — I08.1: ICD-10-CM

## 2021-04-16 DIAGNOSIS — E66.2: ICD-10-CM

## 2021-04-16 DIAGNOSIS — Z79.01: ICD-10-CM

## 2021-04-16 DIAGNOSIS — F31.9: ICD-10-CM

## 2021-04-16 DIAGNOSIS — I50.32: ICD-10-CM

## 2021-04-16 DIAGNOSIS — Z79.82: ICD-10-CM

## 2021-04-16 DIAGNOSIS — I27.20: ICD-10-CM

## 2021-04-16 DIAGNOSIS — I43: ICD-10-CM

## 2021-04-16 PROCEDURE — A9540 TC99M MAA: HCPCS

## 2021-04-17 LAB
BUN/CREATININE RATIO: 13 (ref 0–10)
HGB BLD-MCNC: 10 GM/DL (ref 14–17.5)
RED BLOOD COUNT: 4.65 M/UL (ref 4.2–5.5)
WHITE BLOOD COUNT: 4.6 K/UL (ref 4.5–11)

## 2021-04-18 LAB — BUN/CREATININE RATIO: 14 (ref 0–10)

## 2021-04-19 LAB — BUN/CREATININE RATIO: 16 (ref 0–10)

## 2021-04-19 NOTE — NUR
patient tearful states he just wants to go home,  I encouraged patient to at
least stay until tommorrow and talk with the doctor.  he seem to agree and no
more tears noted

## 2021-04-20 LAB — BUN/CREATININE RATIO: 17 (ref 0–10)

## 2021-04-21 LAB
CREATININE, URINE: 83.3 MG/DL
MICROALB/CREAT RATIO: 583 (ref 0–29)

## 2021-12-17 ENCOUNTER — APPOINTMENT (OUTPATIENT)
Dept: GENERAL RADIOLOGY | Facility: HOSPITAL | Age: 46
End: 2021-12-17

## 2021-12-17 ENCOUNTER — APPOINTMENT (OUTPATIENT)
Dept: CT IMAGING | Facility: HOSPITAL | Age: 46
End: 2021-12-17

## 2021-12-17 ENCOUNTER — HOSPITAL ENCOUNTER (INPATIENT)
Facility: HOSPITAL | Age: 46
LOS: 4 days | Discharge: LEFT BEFORE DISCHARGE | End: 2021-12-21
Attending: STUDENT IN AN ORGANIZED HEALTH CARE EDUCATION/TRAINING PROGRAM | Admitting: STUDENT IN AN ORGANIZED HEALTH CARE EDUCATION/TRAINING PROGRAM

## 2021-12-17 DIAGNOSIS — N17.9 AKI (ACUTE KIDNEY INJURY) (HCC): Primary | ICD-10-CM

## 2021-12-17 DIAGNOSIS — U07.1 COVID-19: ICD-10-CM

## 2021-12-17 LAB
A-A DO2: 16 MMHG (ref 0–300)
ALBUMIN SERPL-MCNC: 3.2 G/DL (ref 3.5–5.2)
ALBUMIN/GLOB SERPL: 1.4 G/DL
ALP SERPL-CCNC: 63 U/L (ref 39–117)
ALT SERPL W P-5'-P-CCNC: 20 U/L (ref 1–41)
AMPHET+METHAMPHET UR QL: NEGATIVE
AMPHETAMINES UR QL: NEGATIVE
ANION GAP SERPL CALCULATED.3IONS-SCNC: 13.3 MMOL/L (ref 5–15)
ARTERIAL PATENCY WRIST A: POSITIVE
AST SERPL-CCNC: 22 U/L (ref 1–40)
ATMOSPHERIC PRESS: 730 MMHG
BACTERIA UR QL AUTO: NORMAL /HPF
BARBITURATES UR QL SCN: NEGATIVE
BASE EXCESS BLDA CALC-SCNC: -6 MMOL/L (ref 0–2)
BASOPHILS # BLD AUTO: 0.02 10*3/MM3 (ref 0–0.2)
BASOPHILS NFR BLD AUTO: 0.4 % (ref 0–1.5)
BDY SITE: ABNORMAL
BENZODIAZ UR QL SCN: NEGATIVE
BILIRUB SERPL-MCNC: 0.2 MG/DL (ref 0–1.2)
BILIRUB UR QL STRIP: NEGATIVE
BODY TEMPERATURE: 0 C
BUN SERPL-MCNC: 70 MG/DL (ref 6–20)
BUN/CREAT SERPL: 9.3 (ref 7–25)
BUPRENORPHINE SERPL-MCNC: NEGATIVE NG/ML
CALCIUM SPEC-SCNC: 7.9 MG/DL (ref 8.6–10.5)
CANNABINOIDS SERPL QL: POSITIVE
CHLORIDE SERPL-SCNC: 109 MMOL/L (ref 98–107)
CLARITY UR: CLEAR
CO2 BLDA-SCNC: 20.9 MMOL/L (ref 22–33)
CO2 SERPL-SCNC: 16.7 MMOL/L (ref 22–29)
COCAINE UR QL: NEGATIVE
COHGB MFR BLD: 1.4 % (ref 0–5)
COLOR UR: YELLOW
CREAT SERPL-MCNC: 7.55 MG/DL (ref 0.76–1.27)
CRP SERPL-MCNC: 3.16 MG/DL (ref 0–0.5)
DEPRECATED RDW RBC AUTO: 49 FL (ref 37–54)
EOSINOPHIL # BLD AUTO: 0.08 10*3/MM3 (ref 0–0.4)
EOSINOPHIL NFR BLD AUTO: 1.7 % (ref 0.3–6.2)
ERYTHROCYTE [DISTWIDTH] IN BLOOD BY AUTOMATED COUNT: 14.9 % (ref 12.3–15.4)
FERRITIN SERPL-MCNC: 148.4 NG/ML (ref 30–400)
GFR SERPL CREATININE-BSD FRML MDRD: 9 ML/MIN/1.73
GFR SERPL CREATININE-BSD FRML MDRD: ABNORMAL ML/MIN/{1.73_M2}
GLOBULIN UR ELPH-MCNC: 2.3 GM/DL
GLUCOSE SERPL-MCNC: 95 MG/DL (ref 65–99)
GLUCOSE UR STRIP-MCNC: NEGATIVE MG/DL
HCO3 BLDA-SCNC: 19.7 MMOL/L (ref 20–26)
HCT VFR BLD AUTO: 33.3 % (ref 37.5–51)
HCT VFR BLD CALC: 31.4 % (ref 38–51)
HGB BLD-MCNC: 10.2 G/DL (ref 13–17.7)
HGB BLDA-MCNC: 10.2 G/DL (ref 14–18)
HGB UR QL STRIP.AUTO: ABNORMAL
HOLD SPECIMEN: NORMAL
HOLD SPECIMEN: NORMAL
HYALINE CASTS UR QL AUTO: NORMAL /LPF
IMM GRANULOCYTES # BLD AUTO: 0.01 10*3/MM3 (ref 0–0.05)
IMM GRANULOCYTES NFR BLD AUTO: 0.2 % (ref 0–0.5)
INHALED O2 CONCENTRATION: 21 %
INR PPP: 0.93 (ref 0.9–1.1)
KETONES UR QL STRIP: NEGATIVE
LDH SERPL-CCNC: 339 U/L (ref 135–225)
LEUKOCYTE ESTERASE UR QL STRIP.AUTO: NEGATIVE
LYMPHOCYTES # BLD AUTO: 0.83 10*3/MM3 (ref 0.7–3.1)
LYMPHOCYTES NFR BLD AUTO: 18 % (ref 19.6–45.3)
Lab: ABNORMAL
MAGNESIUM SERPL-MCNC: 1.8 MG/DL (ref 1.6–2.6)
MCH RBC QN AUTO: 27.5 PG (ref 26.6–33)
MCHC RBC AUTO-ENTMCNC: 30.6 G/DL (ref 31.5–35.7)
MCV RBC AUTO: 89.8 FL (ref 79–97)
METHADONE UR QL SCN: NEGATIVE
METHGB BLD QL: 0.2 % (ref 0–3)
MODALITY: ABNORMAL
MONOCYTES # BLD AUTO: 0.67 10*3/MM3 (ref 0.1–0.9)
MONOCYTES NFR BLD AUTO: 14.5 % (ref 5–12)
NEUTROPHILS NFR BLD AUTO: 3.01 10*3/MM3 (ref 1.7–7)
NEUTROPHILS NFR BLD AUTO: 65.2 % (ref 42.7–76)
NITRITE UR QL STRIP: NEGATIVE
NOTE: ABNORMAL
NRBC BLD AUTO-RTO: 0 /100 WBC (ref 0–0.2)
NT-PROBNP SERPL-MCNC: ABNORMAL PG/ML (ref 0–450)
OPIATES UR QL: NEGATIVE
OXYCODONE UR QL SCN: NEGATIVE
OXYHGB MFR BLDV: 95.2 % (ref 94–99)
PCO2 BLDA: 38.8 MM HG (ref 35–45)
PCO2 TEMP ADJ BLD: ABNORMAL MM[HG]
PCP UR QL SCN: NEGATIVE
PH BLDA: 7.31 PH UNITS (ref 7.35–7.45)
PH UR STRIP.AUTO: 6 [PH] (ref 5–8)
PH, TEMP CORRECTED: ABNORMAL
PLATELET # BLD AUTO: 154 10*3/MM3 (ref 140–450)
PMV BLD AUTO: 10 FL (ref 6–12)
PO2 BLDA: 83.6 MM HG (ref 83–108)
PO2 TEMP ADJ BLD: ABNORMAL MM[HG]
POTASSIUM SERPL-SCNC: 5.3 MMOL/L (ref 3.5–5.2)
PROPOXYPH UR QL: NEGATIVE
PROT SERPL-MCNC: 5.5 G/DL (ref 6–8.5)
PROT UR QL STRIP: ABNORMAL
PROTHROMBIN TIME: 12.9 SECONDS (ref 12.8–14.5)
QT INTERVAL: 410 MS
QTC INTERVAL: 467 MS
RBC # BLD AUTO: 3.71 10*6/MM3 (ref 4.14–5.8)
RBC # UR STRIP: NORMAL /HPF
REF LAB TEST METHOD: NORMAL
SAO2 % BLDCOA: 96.7 % (ref 94–99)
SODIUM SERPL-SCNC: 139 MMOL/L (ref 136–145)
SP GR UR STRIP: 1.01 (ref 1–1.03)
SQUAMOUS #/AREA URNS HPF: NORMAL /HPF
TRICYCLICS UR QL SCN: NEGATIVE
TROPONIN T SERPL-MCNC: 0.33 NG/ML (ref 0–0.03)
TROPONIN T SERPL-MCNC: 0.37 NG/ML (ref 0–0.03)
UROBILINOGEN UR QL STRIP: ABNORMAL
VENTILATOR MODE: ABNORMAL
WBC # UR STRIP: NORMAL /HPF
WBC NRBC COR # BLD: 4.62 10*3/MM3 (ref 3.4–10.8)
WHOLE BLOOD HOLD SPECIMEN: NORMAL
WHOLE BLOOD HOLD SPECIMEN: NORMAL

## 2021-12-17 PROCEDURE — 83880 ASSAY OF NATRIURETIC PEPTIDE: CPT | Performed by: PHYSICIAN ASSISTANT

## 2021-12-17 PROCEDURE — 83615 LACTATE (LD) (LDH) ENZYME: CPT | Performed by: PHYSICIAN ASSISTANT

## 2021-12-17 PROCEDURE — 86140 C-REACTIVE PROTEIN: CPT | Performed by: PHYSICIAN ASSISTANT

## 2021-12-17 PROCEDURE — 99223 1ST HOSP IP/OBS HIGH 75: CPT | Performed by: STUDENT IN AN ORGANIZED HEALTH CARE EDUCATION/TRAINING PROGRAM

## 2021-12-17 PROCEDURE — 71250 CT THORAX DX C-: CPT

## 2021-12-17 PROCEDURE — 87040 BLOOD CULTURE FOR BACTERIA: CPT | Performed by: STUDENT IN AN ORGANIZED HEALTH CARE EDUCATION/TRAINING PROGRAM

## 2021-12-17 PROCEDURE — 83050 HGB METHEMOGLOBIN QUAN: CPT

## 2021-12-17 PROCEDURE — 71045 X-RAY EXAM CHEST 1 VIEW: CPT | Performed by: RADIOLOGY

## 2021-12-17 PROCEDURE — 83735 ASSAY OF MAGNESIUM: CPT | Performed by: PHYSICIAN ASSISTANT

## 2021-12-17 PROCEDURE — 80306 DRUG TEST PRSMV INSTRMNT: CPT | Performed by: PHYSICIAN ASSISTANT

## 2021-12-17 PROCEDURE — 82728 ASSAY OF FERRITIN: CPT | Performed by: PHYSICIAN ASSISTANT

## 2021-12-17 PROCEDURE — 85025 COMPLETE CBC W/AUTO DIFF WBC: CPT | Performed by: PHYSICIAN ASSISTANT

## 2021-12-17 PROCEDURE — 82375 ASSAY CARBOXYHB QUANT: CPT

## 2021-12-17 PROCEDURE — 84145 PROCALCITONIN (PCT): CPT | Performed by: PHYSICIAN ASSISTANT

## 2021-12-17 PROCEDURE — 74176 CT ABD & PELVIS W/O CONTRAST: CPT | Performed by: RADIOLOGY

## 2021-12-17 PROCEDURE — 80053 COMPREHEN METABOLIC PANEL: CPT | Performed by: PHYSICIAN ASSISTANT

## 2021-12-17 PROCEDURE — 84484 ASSAY OF TROPONIN QUANT: CPT | Performed by: PHYSICIAN ASSISTANT

## 2021-12-17 PROCEDURE — 71045 X-RAY EXAM CHEST 1 VIEW: CPT

## 2021-12-17 PROCEDURE — 71250 CT THORAX DX C-: CPT | Performed by: RADIOLOGY

## 2021-12-17 PROCEDURE — 85610 PROTHROMBIN TIME: CPT | Performed by: PHYSICIAN ASSISTANT

## 2021-12-17 PROCEDURE — 81001 URINALYSIS AUTO W/SCOPE: CPT | Performed by: PHYSICIAN ASSISTANT

## 2021-12-17 PROCEDURE — 74176 CT ABD & PELVIS W/O CONTRAST: CPT

## 2021-12-17 PROCEDURE — 93005 ELECTROCARDIOGRAM TRACING: CPT | Performed by: PHYSICIAN ASSISTANT

## 2021-12-17 PROCEDURE — 36415 COLL VENOUS BLD VENIPUNCTURE: CPT

## 2021-12-17 PROCEDURE — 93010 ELECTROCARDIOGRAM REPORT: CPT | Performed by: INTERNAL MEDICINE

## 2021-12-17 PROCEDURE — 36600 WITHDRAWAL OF ARTERIAL BLOOD: CPT

## 2021-12-17 PROCEDURE — 99284 EMERGENCY DEPT VISIT MOD MDM: CPT

## 2021-12-17 PROCEDURE — 82805 BLOOD GASES W/O2 SATURATION: CPT

## 2021-12-17 RX ORDER — MINOXIDIL 10 MG/1
10 TABLET ORAL NIGHTLY
COMMUNITY

## 2021-12-17 RX ORDER — SODIUM CHLORIDE 0.9 % (FLUSH) 0.9 %
10 SYRINGE (ML) INJECTION EVERY 12 HOURS SCHEDULED
Status: DISCONTINUED | OUTPATIENT
Start: 2021-12-17 | End: 2021-12-21 | Stop reason: HOSPADM

## 2021-12-17 RX ORDER — FERROUS SULFATE 325(65) MG
325 TABLET ORAL
COMMUNITY

## 2021-12-17 RX ORDER — MONTELUKAST SODIUM 10 MG/1
10 TABLET ORAL NIGHTLY
Status: DISCONTINUED | OUTPATIENT
Start: 2021-12-17 | End: 2021-12-21 | Stop reason: HOSPADM

## 2021-12-17 RX ORDER — ISOSORBIDE MONONITRATE 60 MG/1
120 TABLET, EXTENDED RELEASE ORAL DAILY
Status: DISCONTINUED | OUTPATIENT
Start: 2021-12-18 | End: 2021-12-21 | Stop reason: HOSPADM

## 2021-12-17 RX ORDER — SODIUM CHLORIDE 0.9 % (FLUSH) 0.9 %
10 SYRINGE (ML) INJECTION AS NEEDED
Status: DISCONTINUED | OUTPATIENT
Start: 2021-12-17 | End: 2021-12-21 | Stop reason: HOSPADM

## 2021-12-17 RX ORDER — BUPROPION HYDROCHLORIDE 300 MG/1
300 TABLET ORAL EVERY MORNING
COMMUNITY

## 2021-12-17 RX ORDER — MINOXIDIL 10 MG/1
10 TABLET ORAL NIGHTLY
Status: DISCONTINUED | OUTPATIENT
Start: 2021-12-17 | End: 2021-12-21 | Stop reason: HOSPADM

## 2021-12-17 RX ORDER — HEPARIN SODIUM 5000 [USP'U]/ML
5000 INJECTION, SOLUTION INTRAVENOUS; SUBCUTANEOUS EVERY 8 HOURS SCHEDULED
Status: DISCONTINUED | OUTPATIENT
Start: 2021-12-17 | End: 2021-12-17

## 2021-12-17 RX ORDER — PREDNISONE 1 MG/1
5 TABLET ORAL DAILY
Status: CANCELLED | OUTPATIENT
Start: 2021-12-18

## 2021-12-17 RX ORDER — LABETALOL 100 MG/1
300 TABLET, FILM COATED ORAL ONCE
Status: COMPLETED | OUTPATIENT
Start: 2021-12-17 | End: 2021-12-17

## 2021-12-17 RX ORDER — BUPROPION HYDROCHLORIDE 150 MG/1
300 TABLET ORAL EVERY MORNING
Status: DISCONTINUED | OUTPATIENT
Start: 2021-12-18 | End: 2021-12-21 | Stop reason: HOSPADM

## 2021-12-17 RX ORDER — CLONIDINE HYDROCHLORIDE 0.1 MG/1
0.3 TABLET ORAL ONCE
Status: COMPLETED | OUTPATIENT
Start: 2021-12-17 | End: 2021-12-17

## 2021-12-17 RX ORDER — CLONIDINE HYDROCHLORIDE 0.3 MG/1
0.3 TABLET ORAL 3 TIMES DAILY
Status: DISCONTINUED | OUTPATIENT
Start: 2021-12-17 | End: 2021-12-21 | Stop reason: HOSPADM

## 2021-12-17 RX ORDER — ALPRAZOLAM 1 MG/1
1 TABLET ORAL NIGHTLY
Status: DISCONTINUED | OUTPATIENT
Start: 2021-12-17 | End: 2021-12-18

## 2021-12-17 RX ORDER — TAMSULOSIN HYDROCHLORIDE 0.4 MG/1
0.4 CAPSULE ORAL DAILY
Status: DISCONTINUED | OUTPATIENT
Start: 2021-12-18 | End: 2021-12-21 | Stop reason: HOSPADM

## 2021-12-17 RX ORDER — BUMETANIDE 1 MG/1
1 TABLET ORAL DAILY
Status: CANCELLED | OUTPATIENT
Start: 2021-12-18

## 2021-12-17 RX ORDER — AMLODIPINE BESYLATE 10 MG/1
10 TABLET ORAL DAILY
COMMUNITY

## 2021-12-17 RX ORDER — EZETIMIBE 10 MG/1
10 TABLET ORAL DAILY
COMMUNITY

## 2021-12-17 RX ORDER — PREDNISONE 1 MG/1
5 TABLET ORAL DAILY
COMMUNITY

## 2021-12-17 RX ORDER — ASPIRIN 81 MG/1
324 TABLET, CHEWABLE ORAL ONCE
Status: COMPLETED | OUTPATIENT
Start: 2021-12-17 | End: 2021-12-17

## 2021-12-17 RX ORDER — AMLODIPINE BESYLATE 10 MG/1
10 TABLET ORAL DAILY
Status: DISCONTINUED | OUTPATIENT
Start: 2021-12-18 | End: 2021-12-21 | Stop reason: HOSPADM

## 2021-12-17 RX ORDER — FERROUS SULFATE 325(65) MG
325 TABLET ORAL
Status: DISCONTINUED | OUTPATIENT
Start: 2021-12-18 | End: 2021-12-21 | Stop reason: HOSPADM

## 2021-12-17 RX ORDER — CLONIDINE HYDROCHLORIDE 0.3 MG/1
0.3 TABLET ORAL 3 TIMES DAILY
COMMUNITY

## 2021-12-17 RX ORDER — ATORVASTATIN CALCIUM 40 MG/1
40 TABLET, FILM COATED ORAL NIGHTLY
Status: DISCONTINUED | OUTPATIENT
Start: 2021-12-17 | End: 2021-12-21 | Stop reason: HOSPADM

## 2021-12-17 RX ADMIN — ATORVASTATIN CALCIUM 40 MG: 40 TABLET, FILM COATED ORAL at 22:21

## 2021-12-17 RX ADMIN — MINOXIDIL 10 MG: 10 TABLET ORAL at 22:21

## 2021-12-17 RX ADMIN — ASPIRIN 324 MG: 81 TABLET, CHEWABLE ORAL at 13:16

## 2021-12-17 RX ADMIN — LABETALOL HCL 300 MG: 100 TABLET, FILM COATED ORAL at 18:32

## 2021-12-17 RX ADMIN — SODIUM CHLORIDE, PRESERVATIVE FREE 10 ML: 5 INJECTION INTRAVENOUS at 19:44

## 2021-12-17 RX ADMIN — MONTELUKAST SODIUM 10 MG: 10 TABLET, COATED ORAL at 22:21

## 2021-12-17 RX ADMIN — ALPRAZOLAM 1 MG: 1 TABLET ORAL at 22:21

## 2021-12-17 RX ADMIN — APIXABAN 5 MG: 5 TABLET, FILM COATED ORAL at 22:21

## 2021-12-17 RX ADMIN — CLONIDINE HYDROCHLORIDE 0.3 MG: 0.1 TABLET ORAL at 18:33

## 2021-12-17 NOTE — ED NOTES
MEDICAL SCREENING:    Reason for Visit: shortness of breath and chest pain. Tested positive for COVID on Sunday. Fully vaccinated.    Patient initially seen in triage.  The patient was advised further evaluation and diagnostic testing will be needed, some of the treatment and testing will be initiated in the lobby in order to begin the process.  The patient will be returned to the waiting area for the time being and possibly be re-assessed by a subsequent ED provider.  The patient will be brought back to the treatment area in as timely manner as possible.       Kenneth Schroeder PA-C  12/17/21 1300

## 2021-12-17 NOTE — ED PROVIDER NOTES
Subjective   47 yo male patient presents to the ED with complaints of SOB and chest pain. Patient states that he has felt like this for 5 days. Reports that he went to [facility] on  and was swabbed positive for COVID. He states that they wanted to admit him but had no beds and no where to transfer, so he was discharged home. Continued to feel worse so he returned today. He was recommended to come here as they still did not have beds. Pt states he has hx of CHF, COPD, CKD - no dialysis, HTN, KATHLEEN, and CAD.       History provided by:  Patient   used: No        Review of Systems   HENT: Negative.    Eyes: Negative.    Respiratory: Positive for chest tightness and shortness of breath.    Cardiovascular: Negative.    Gastrointestinal: Negative.    Endocrine: Negative.    Genitourinary: Negative.    Musculoskeletal: Negative.    Skin: Negative.    Allergic/Immunologic: Negative.    Neurological: Negative.    Hematological: Negative.    Psychiatric/Behavioral: Negative.    All other systems reviewed and are negative.      Past Medical History:   Diagnosis Date   • Arthritis    • Asthma    • CHF (congestive heart failure) (CMS/[facility])    • COPD (chronic obstructive pulmonary disease) (CMS/Formerly [facility])    • Coronary artery disease    • Hypertension    • Sleep apnea        No Known Allergies    Past Surgical History:   Procedure Laterality Date   • CARDIAC CATHETERIZATION  2008   • HERNIA REPAIR  1982       Family History   Problem Relation Age of Onset   • Hypertension Mother    • Hypertension Father    • Hypertension Sister    • Heart disease Sister        Social History     Socioeconomic History   • Marital status: Single   Tobacco Use   • Smoking status: Former Smoker     Packs/day: 0.25     Types: Cigarettes     Quit date: 2014     Years since quittin.3   • Smokeless tobacco: Never Used   Substance and Sexual Activity   • Alcohol use: Never   • Drug use: Yes     Frequency: 7.0 times per  week     Types: Marijuana     Comment: tobacco free   • Sexual activity: Yes     Partners: Female           Objective   Physical Exam  Vitals and nursing note reviewed.   Constitutional:       Appearance: He is well-developed and normal weight.   HENT:      Head: Normocephalic and atraumatic.      Mouth/Throat:      Mouth: Mucous membranes are moist.      Pharynx: Oropharynx is clear.   Eyes:      Extraocular Movements: Extraocular movements intact.      Pupils: Pupils are equal, round, and reactive to light.   Cardiovascular:      Rate and Rhythm: Normal rate and regular rhythm.      Pulses: Normal pulses.      Heart sounds: Normal heart sounds.   Pulmonary:      Effort: Pulmonary effort is normal.      Breath sounds: Normal breath sounds.   Abdominal:      General: Bowel sounds are normal.      Palpations: Abdomen is soft.   Musculoskeletal:         General: Normal range of motion.      Cervical back: Normal range of motion and neck supple.   Skin:     General: Skin is warm and dry.      Capillary Refill: Capillary refill takes less than 2 seconds.   Neurological:      General: No focal deficit present.      Mental Status: He is alert and oriented to person, place, and time.   Psychiatric:         Mood and Affect: Mood normal.         Behavior: Behavior normal.         Procedures           ED Course  ED Course as of 12/17/21 1842   Fri Dec 17, 2021   1329 EKG at 1327 hrs. NSR 78 bpm, , , QTc 467, regular axis, Q waves in the anteroseptal leads, no significant ST deviation concerning for acute ischemia. [KP]   1455 XR Chest 1 View    IMPRESSION:  1.  Cardiomegaly.  2.  Minimal blunting of bilateral costophrenic angle suggestive of tiny  pleural effusion.     This report was finalized on 12/17/2021 2:38 PM by Dr. Stu Simpson MD.             Specimen Collected: 12/17/21 14:34 Last Resulted: 12/17/21 14:38           [ML]   1556 CT Chest Without Contrast Diagnostic  IMPRESSION:  1.  Tiny right pleural  effusion.  2.  Cardiomegaly.     This report was finalized on 12/17/2021 3:53 PM by Dr. Stu Simpson MD.             Specimen Collected: 12/17/21 15:53 Last Resulted: 12/17/21 15:53         [ML]   1557 CT Abdomen Pelvis Without Contrast  IMPRESSION:    No acute findings in the abdomen or pelvis.     This report was finalized on 12/17/2021 3:54 PM by Dr. Stu Simpson MD.             Specimen Collected: 12/17/21 15:53 Last Resulted: 12/17/21 15:54         [ML]   1627 Attempted to contact Dr. Shultz - he told me that he was not on call until 5PM. I reviewed the call list it has him listed for call all day. Derrick Garcia Super contacted concerned for a mistake.  He states to just wait until 5 and call back.   [ML]   1629 Paged hospitalist  [ML]   1653 Discussed with [facility] - he will admit  [ML]   1703 I have positive COVID results from [place] attached to paper chart.   [ML]   1827 PT due for BP medications. He takes Clonidine 0.3mg and Labetalol 300mg  [ML]      ED Course User Index  [KP] Jaquan Casas MD  [ML] Camille Mcfarlane PA                                                 MDM  Number of Diagnoses or Management Options     Amount and/or Complexity of Data Reviewed  Clinical lab tests: ordered and reviewed  Tests in the radiology section of CPT®: ordered and reviewed  Decide to obtain previous medical records or to obtain history from someone other than the patient: yes  Review and summarize past medical records: yes  Discuss the patient with other providers: yes    Risk of Complications, Morbidity, and/or Mortality  Presenting problems: moderate  Diagnostic procedures: moderate  Management options: moderate    Patient Progress  Patient progress: stable      Final diagnoses:   ANSELMO (acute kidney injury) (HCC)   COVID-19       ED Disposition  ED Disposition     ED Disposition Condition Comment    Decision to Admit            No follow-up provider specified.       Medication List      No changes were made  to your prescriptions during this visit.          Camille Mcfarlane PA  12/17/21 1701       Camille Mcfarlane PA  12/17/21 4325

## 2021-12-17 NOTE — ED NOTES
Pt sitting in lobby, nad noted, respirations nonlabored and even, awake and alert     Peggy Alonzo, RN  12/17/21 5499

## 2021-12-18 ENCOUNTER — APPOINTMENT (OUTPATIENT)
Dept: ULTRASOUND IMAGING | Facility: HOSPITAL | Age: 46
End: 2021-12-18

## 2021-12-18 ENCOUNTER — APPOINTMENT (OUTPATIENT)
Dept: CARDIOLOGY | Facility: HOSPITAL | Age: 46
End: 2021-12-18

## 2021-12-18 LAB
ALBUMIN SERPL-MCNC: 2.55 G/DL (ref 3.5–5.2)
ALBUMIN/GLOB SERPL: 0.9 G/DL
ALP SERPL-CCNC: 55 U/L (ref 39–117)
ALT SERPL W P-5'-P-CCNC: 16 U/L (ref 1–41)
ANION GAP SERPL CALCULATED.3IONS-SCNC: 10.6 MMOL/L (ref 5–15)
AST SERPL-CCNC: 20 U/L (ref 1–40)
BASOPHILS # BLD AUTO: 0.01 10*3/MM3 (ref 0–0.2)
BASOPHILS NFR BLD AUTO: 0.3 % (ref 0–1.5)
BH CV ECHO MEAS - ACS: 2.4 CM
BH CV ECHO MEAS - AO MAX PG: 6.4 MMHG
BH CV ECHO MEAS - AO MEAN PG: 3 MMHG
BH CV ECHO MEAS - AO ROOT AREA (BSA CORRECTED): 1.4
BH CV ECHO MEAS - AO ROOT AREA: 9.9 CM^2
BH CV ECHO MEAS - AO ROOT DIAM: 3.6 CM
BH CV ECHO MEAS - AO V2 MAX: 126 CM/SEC
BH CV ECHO MEAS - AO V2 MEAN: 84.6 CM/SEC
BH CV ECHO MEAS - AO V2 VTI: 22.9 CM
BH CV ECHO MEAS - BSA(HAYCOCK): 2.7 M^2
BH CV ECHO MEAS - BSA: 2.5 M^2
BH CV ECHO MEAS - BZI_BMI: 47 KILOGRAMS/M^2
BH CV ECHO MEAS - BZI_METRIC_HEIGHT: 175.3 CM
BH CV ECHO MEAS - BZI_METRIC_WEIGHT: 144.2 KG
BH CV ECHO MEAS - EDV(CUBED): 157.5 ML
BH CV ECHO MEAS - EDV(MOD-SP4): 105 ML
BH CV ECHO MEAS - EDV(TEICH): 141.3 ML
BH CV ECHO MEAS - EF(CUBED): 67.8 %
BH CV ECHO MEAS - EF(MOD-SP4): 71.7 %
BH CV ECHO MEAS - EF(TEICH): 58.9 %
BH CV ECHO MEAS - ESV(CUBED): 50.7 ML
BH CV ECHO MEAS - ESV(MOD-SP4): 29.7 ML
BH CV ECHO MEAS - ESV(TEICH): 58.1 ML
BH CV ECHO MEAS - FS: 31.5 %
BH CV ECHO MEAS - IVS/LVPW: 1
BH CV ECHO MEAS - IVSD: 2.6 CM
BH CV ECHO MEAS - LA DIMENSION: 4.7 CM
BH CV ECHO MEAS - LA/AO: 1.3
BH CV ECHO MEAS - LV DIASTOLIC VOL/BSA (35-75): 41.7 ML/M^2
BH CV ECHO MEAS - LV MASS(C)D: 832.7 GRAMS
BH CV ECHO MEAS - LV MASS(C)DI: 331 GRAMS/M^2
BH CV ECHO MEAS - LV SYSTOLIC VOL/BSA (12-30): 11.8 ML/M^2
BH CV ECHO MEAS - LVIDD: 5.4 CM
BH CV ECHO MEAS - LVIDS: 3.7 CM
BH CV ECHO MEAS - LVLD AP4: 7.7 CM
BH CV ECHO MEAS - LVLS AP4: 6.6 CM
BH CV ECHO MEAS - LVOT AREA (M): 4.2 CM^2
BH CV ECHO MEAS - LVOT AREA: 4.2 CM^2
BH CV ECHO MEAS - LVOT DIAM: 2.3 CM
BH CV ECHO MEAS - LVPWD: 2.5 CM
BH CV ECHO MEAS - MV A MAX VEL: 92.4 CM/SEC
BH CV ECHO MEAS - MV E MAX VEL: 113 CM/SEC
BH CV ECHO MEAS - MV E/A: 1.2
BH CV ECHO MEAS - PA ACC TIME: 0.11 SEC
BH CV ECHO MEAS - PA PR(ACCEL): 28.2 MMHG
BH CV ECHO MEAS - SI(AO): 90.1 ML/M^2
BH CV ECHO MEAS - SI(CUBED): 42.5 ML/M^2
BH CV ECHO MEAS - SI(MOD-SP4): 29.9 ML/M^2
BH CV ECHO MEAS - SI(TEICH): 33.1 ML/M^2
BH CV ECHO MEAS - SV(AO): 226.7 ML
BH CV ECHO MEAS - SV(CUBED): 106.8 ML
BH CV ECHO MEAS - SV(MOD-SP4): 75.3 ML
BH CV ECHO MEAS - SV(TEICH): 83.2 ML
BILIRUB SERPL-MCNC: 0.2 MG/DL (ref 0–1.2)
BUN SERPL-MCNC: 73 MG/DL (ref 6–20)
BUN/CREAT SERPL: 10.4 (ref 7–25)
CALCIUM SPEC-SCNC: 7.8 MG/DL (ref 8.6–10.5)
CHLORIDE SERPL-SCNC: 107 MMOL/L (ref 98–107)
CO2 SERPL-SCNC: 16.4 MMOL/L (ref 22–29)
CREAT SERPL-MCNC: 7 MG/DL (ref 0.76–1.27)
DEPRECATED RDW RBC AUTO: 47.9 FL (ref 37–54)
EOSINOPHIL # BLD AUTO: 0.1 10*3/MM3 (ref 0–0.4)
EOSINOPHIL NFR BLD AUTO: 3 % (ref 0.3–6.2)
ERYTHROCYTE [DISTWIDTH] IN BLOOD BY AUTOMATED COUNT: 14.8 % (ref 12.3–15.4)
GFR SERPL CREATININE-BSD FRML MDRD: 10 ML/MIN/1.73
GFR SERPL CREATININE-BSD FRML MDRD: ABNORMAL ML/MIN/{1.73_M2}
GLOBULIN UR ELPH-MCNC: 3 GM/DL
GLUCOSE SERPL-MCNC: 114 MG/DL (ref 65–99)
HCT VFR BLD AUTO: 30 % (ref 37.5–51)
HGB BLD-MCNC: 9.1 G/DL (ref 13–17.7)
IMM GRANULOCYTES # BLD AUTO: 0.01 10*3/MM3 (ref 0–0.05)
IMM GRANULOCYTES NFR BLD AUTO: 0.3 % (ref 0–0.5)
LYMPHOCYTES # BLD AUTO: 1.03 10*3/MM3 (ref 0.7–3.1)
LYMPHOCYTES NFR BLD AUTO: 31.4 % (ref 19.6–45.3)
MAXIMAL PREDICTED HEART RATE: 174 BPM
MCH RBC QN AUTO: 27 PG (ref 26.6–33)
MCHC RBC AUTO-ENTMCNC: 30.3 G/DL (ref 31.5–35.7)
MCV RBC AUTO: 89 FL (ref 79–97)
MONOCYTES # BLD AUTO: 0.59 10*3/MM3 (ref 0.1–0.9)
MONOCYTES NFR BLD AUTO: 18 % (ref 5–12)
NEUTROPHILS NFR BLD AUTO: 1.54 10*3/MM3 (ref 1.7–7)
NEUTROPHILS NFR BLD AUTO: 47 % (ref 42.7–76)
NRBC BLD AUTO-RTO: 0 /100 WBC (ref 0–0.2)
PLATELET # BLD AUTO: 155 10*3/MM3 (ref 140–450)
PMV BLD AUTO: 9.8 FL (ref 6–12)
POTASSIUM SERPL-SCNC: 5 MMOL/L (ref 3.5–5.2)
PROCALCITONIN SERPL-MCNC: 0.21 NG/ML (ref 0–0.25)
PROT SERPL-MCNC: 5.5 G/DL (ref 6–8.5)
RBC # BLD AUTO: 3.37 10*6/MM3 (ref 4.14–5.8)
SODIUM SERPL-SCNC: 134 MMOL/L (ref 136–145)
STRESS TARGET HR: 148 BPM
WBC NRBC COR # BLD: 3.28 10*3/MM3 (ref 3.4–10.8)

## 2021-12-18 PROCEDURE — 76775 US EXAM ABDO BACK WALL LIM: CPT

## 2021-12-18 PROCEDURE — 85025 COMPLETE CBC W/AUTO DIFF WBC: CPT | Performed by: STUDENT IN AN ORGANIZED HEALTH CARE EDUCATION/TRAINING PROGRAM

## 2021-12-18 PROCEDURE — 25010000002 ALBUMIN HUMAN 25% PER 50 ML: Performed by: INTERNAL MEDICINE

## 2021-12-18 PROCEDURE — 93306 TTE W/DOPPLER COMPLETE: CPT | Performed by: SPECIALIST

## 2021-12-18 PROCEDURE — 99232 SBSQ HOSP IP/OBS MODERATE 35: CPT | Performed by: STUDENT IN AN ORGANIZED HEALTH CARE EDUCATION/TRAINING PROGRAM

## 2021-12-18 PROCEDURE — 94799 UNLISTED PULMONARY SVC/PX: CPT

## 2021-12-18 PROCEDURE — P9047 ALBUMIN (HUMAN), 25%, 50ML: HCPCS | Performed by: INTERNAL MEDICINE

## 2021-12-18 PROCEDURE — 93306 TTE W/DOPPLER COMPLETE: CPT

## 2021-12-18 PROCEDURE — 80053 COMPREHEN METABOLIC PANEL: CPT | Performed by: STUDENT IN AN ORGANIZED HEALTH CARE EDUCATION/TRAINING PROGRAM

## 2021-12-18 PROCEDURE — 94660 CPAP INITIATION&MGMT: CPT

## 2021-12-18 RX ORDER — ALPRAZOLAM 1 MG/1
1 TABLET ORAL ONCE
Status: COMPLETED | OUTPATIENT
Start: 2021-12-18 | End: 2021-12-18

## 2021-12-18 RX ORDER — ALBUMIN (HUMAN) 12.5 G/50ML
25 SOLUTION INTRAVENOUS ONCE
Status: COMPLETED | OUTPATIENT
Start: 2021-12-18 | End: 2021-12-18

## 2021-12-18 RX ORDER — ALPRAZOLAM 1 MG/1
1 TABLET ORAL 2 TIMES DAILY
Status: DISCONTINUED | OUTPATIENT
Start: 2021-12-18 | End: 2021-12-21 | Stop reason: HOSPADM

## 2021-12-18 RX ADMIN — ATORVASTATIN CALCIUM 40 MG: 40 TABLET, FILM COATED ORAL at 20:11

## 2021-12-18 RX ADMIN — ALPRAZOLAM 1 MG: 1 TABLET ORAL at 20:11

## 2021-12-18 RX ADMIN — MONTELUKAST SODIUM 10 MG: 10 TABLET, COATED ORAL at 20:11

## 2021-12-18 RX ADMIN — LABETALOL HYDROCHLORIDE 300 MG: 200 TABLET, FILM COATED ORAL at 20:11

## 2021-12-18 RX ADMIN — APIXABAN 5 MG: 5 TABLET, FILM COATED ORAL at 08:10

## 2021-12-18 RX ADMIN — CLONIDINE HYDROCHLORIDE 0.3 MG: 0.3 TABLET ORAL at 16:42

## 2021-12-18 RX ADMIN — APIXABAN 5 MG: 5 TABLET, FILM COATED ORAL at 20:11

## 2021-12-18 RX ADMIN — LABETALOL HYDROCHLORIDE 300 MG: 200 TABLET, FILM COATED ORAL at 08:10

## 2021-12-18 RX ADMIN — FERROUS SULFATE TAB 325 MG (65 MG ELEMENTAL FE) 325 MG: 325 (65 FE) TAB at 08:09

## 2021-12-18 RX ADMIN — CLONIDINE HYDROCHLORIDE 0.3 MG: 0.3 TABLET ORAL at 08:10

## 2021-12-18 RX ADMIN — ALBUMIN HUMAN 25 G: 0.25 SOLUTION INTRAVENOUS at 10:59

## 2021-12-18 RX ADMIN — SODIUM CHLORIDE, PRESERVATIVE FREE 10 ML: 5 INJECTION INTRAVENOUS at 08:11

## 2021-12-18 RX ADMIN — ISOSORBIDE MONONITRATE 120 MG: 60 TABLET ORAL at 08:09

## 2021-12-18 RX ADMIN — MINOXIDIL 10 MG: 10 TABLET ORAL at 20:11

## 2021-12-18 RX ADMIN — CLONIDINE HYDROCHLORIDE 0.3 MG: 0.3 TABLET ORAL at 20:11

## 2021-12-18 RX ADMIN — BUPROPION HYDROCHLORIDE 300 MG: 150 TABLET, FILM COATED, EXTENDED RELEASE ORAL at 06:06

## 2021-12-18 RX ADMIN — TAMSULOSIN HYDROCHLORIDE 0.4 MG: 0.4 CAPSULE ORAL at 08:09

## 2021-12-18 RX ADMIN — LABETALOL HYDROCHLORIDE 300 MG: 200 TABLET, FILM COATED ORAL at 16:42

## 2021-12-18 RX ADMIN — AMLODIPINE BESYLATE 10 MG: 10 TABLET ORAL at 08:09

## 2021-12-18 RX ADMIN — ALPRAZOLAM 1 MG: 1 TABLET ORAL at 20:54

## 2021-12-18 NOTE — H&P
[facility] HOSPITALIST HISTORY AND PHYSICAL    Patient Identification:  Name:  Sudarshan Keene  Age:  46 y.o.  Sex:  male  :  1975  MRN:  [id number]   Visit Number:  [id number]  Admit Date: 2021   Room number:  3321/1P  Primary Care Physician:  Dedra Azar MD     Subjective     Chief complaint:    Chief Complaint   Patient presents with   • Exposure To Known Illness   • Shortness of Breath       History of presenting illness:  46 y.o. male with past medical history significant for chronic kidney disease, heart failure, COPD, obstructional sleep apnea, hypertension, paroxysmal atrial fibrillation, nonobstructive coronary artery disease obesity and marijuana usage who presents to the hospital at the recommendation of outside emergency department for further evaluation of acute kidney injury on CKD.  Patient states that he previously presented to [facility] emergency department on 2021 due to feelings of shortness of breath and persistent cough concern for heart failure exacerbation but was found to have COVID-19 positive status.  On evaluation patient also noted to have significantly elevated creatinine greater than 7 above his baseline however at the time there were no beds for the patient at the hospital and no outside facility was accepting patients at the time patient was discharged from the emergency department home.  Patient presented back to the emergency department again today due to feeling worse however again there were no beds available and patient was recommended to present to our emergency department for evaluation for possible admission.  Patient denies any fevers or chills, nausea, vomiting, or diarrhea.  Patient does admit to shortness of breath.  He denies any decrease or increased urinary output or change in his eating habits and admits that he has been eating drinking as usual and sticking to fluid restriction of 1800 cc daily.  Due to his significantly  elevated creatinine patient was admitted to the hospitalist service for evaluation and treatment and for evaluation by nephrology in consultation.  ---------------------------------------------------------------------------------------------------------------------   Review of Systems a 14 system review of system was performed pertinent positives and negatives above in HPI.  ---------------------------------------------------------------------------------------------------------------------   Past Medical History:   Diagnosis Date   • Arthritis    • Asthma    • CHF (congestive heart failure) ([facility])    • COPD (chronic obstructive pulmonary disease) ([facility])    • Coronary artery disease    • Hypertension    • Sleep apnea      Past Surgical History:   Procedure Laterality Date   • CARDIAC CATHETERIZATION  2008   • HERNIA REPAIR  1982     Family History   Problem Relation Age of Onset   • Hypertension Mother    • Hypertension Father    • Hypertension Sister    • Heart disease Sister      Social History     Socioeconomic History   • Marital status: Single   Tobacco Use   • Smoking status: Former Smoker     Packs/day: 0.25     Types: Cigarettes     Quit date: 2014     Years since quittin.3   • Smokeless tobacco: Never Used   Substance and Sexual Activity   • Alcohol use: Never   • Drug use: Yes     Frequency: 7.0 times per week     Types: Marijuana     Comment: tobacco free   • Sexual activity: Yes     Partners: Female     ---------------------------------------------------------------------------------------------------------------------   Allergies:  Patient has no known allergies.  ---------------------------------------------------------------------------------------------------------------------   Medications below are reported home medications pulling from within the system; at this time, these medications have not been reconciled unless otherwise specified and are in the verification process for further  verifcation as current home medications.    Prior to Admission Medications     Prescriptions Last Dose Informant Patient Reported? Taking?    amLODIPine (NORVASC) 10 MG tablet   Yes Yes    Take 10 mg by mouth Daily.    apixaban (ELIQUIS) 5 MG tablet tablet   Yes Yes    Take 5 mg by mouth 2 (Two) Times a Day.    atorvastatin (LIPITOR) 40 MG tablet 12/16/2021 Spouse/Significant Other Yes Yes    Take 40 mg by mouth Every Night.    bumetanide (BUMEX) 1 MG tablet 12/17/2021  No Yes    Take 1 tablet by mouth Daily.    buPROPion XL (WELLBUTRIN XL) 300 MG 24 hr tablet 12/17/2021  Yes Yes    Take 300 mg by mouth Every Morning.    ezetimibe (ZETIA) 10 MG tablet 12/17/2021  Yes Yes    Take 10 mg by mouth Daily.    ferrous sulfate 325 (65 FE) MG tablet 12/17/2021  Yes Yes    Take 325 mg by mouth Daily With Breakfast.    isosorbide mononitrate (IMDUR) 120 MG 24 hr tablet 12/17/2021 Spouse/Significant Other Yes Yes    Take 120 mg by mouth Daily.    minoxidil (LONITEN) 10 MG tablet 12/16/2021  Yes Yes    Take 10 mg by mouth Every Night.    predniSONE (DELTASONE) 5 MG tablet   Yes Yes    Take 5 mg by mouth Daily.    ALPRAZolam (XANAX) 1 MG tablet  Spouse/Significant Other Yes No    Take 1 mg by mouth 2 (Two) Times a Day As Needed for Anxiety.    aspirin 81 MG EC tablet  Spouse/Significant Other Yes No    Take 81 mg by mouth Daily.    buPROPion SR (WELLBUTRIN SR) 100 MG 12 hr tablet  Spouse/Significant Other Yes No    Take 200 mg by mouth Daily.    cloNIDine (CATAPRES) 0.2 MG tablet  Spouse/Significant Other Yes No    Take 0.3 mg by mouth 3 (Three) Times a Day.    labetalol (NORMODYNE) 300 MG tablet  Spouse/Significant Other Yes No    Take 300 mg by mouth 3 (Three) Times a Day.    montelukast (SINGULAIR) 10 MG tablet  Spouse/Significant Other Yes No    Take 10 mg by mouth Every Night.    tamsulosin (FLOMAX) 0.4 MG capsule 24 hr capsule  Spouse/Significant Other Yes No    Take 1 capsule by mouth Every Night.        Objective      Vital Signs:  Temp:  [96.2 °F (35.7 °C)-98.5 °F (36.9 °C)] 96.2 °F (35.7 °C)  Heart Rate:  [72-85] 74  Resp:  [16-18] 18  BP: (119-190)/() 138/108    Mean Arterial Pressure (Non-Invasive) for the past 24 hrs (Last 3 readings):   Noninvasive MAP (mmHg)   12/17/21 1928 132   12/17/21 1743 127   12/17/21 1724 138     SpO2:  [91 %-100 %] 95 %  on   ;      Body mass index is 46.43 kg/m².    Wt Readings from Last 3 Encounters:   12/17/21 (!) 145 kg (318 lb 15.4 oz)   08/04/20 (!) 166 kg (366 lb 11.2 oz)      ---------------------------------------------------------------------------------------------------------------------   Physical Exam:  Constitutional:  Well-developed and well-nourished.  No respiratory distress.      HENT:  Head: Normocephalic and atraumatic.  Mouth:  Moist mucous membranes.    Eyes:  Conjunctivae and EOM are normal.  Pupils are equal, round, and reactive to light.  No scleral icterus.  Cardiovascular:  Normal rate, regular rhythm and normal heart sounds with no murmur.  Pulmonary/Chest:  No respiratory distress, no wheezes, no crackles, with normal breath sounds and good air movement.  Abdominal:  Soft.  Bowel sounds are normal.  No distension and no tenderness.   Musculoskeletal:  No edema, no tenderness, and no deformity.  No red or swollen joints anywhere.    Neurological:  Alert and oriented to person, place, and time.  No cranial nerve deficit.   Skin:  Skin is warm and dry.  No rash noted.  No pallor.   Peripheral vascular:  No edema and pulses on all 4 extremities.    ---------------------------------------------------------------------------------------------------------------------  EKG:    ECG 12 Lead   Final Result   Test Reason : Chest Pain   Blood Pressure :   */*   mmHG   Vent. Rate :  78 BPM     Atrial Rate :  78 BPM      P-R Int : 168 ms          QRS Dur : 100 ms       QT Int : 410 ms       P-R-T Axes :  52 -25  26 degrees      QTc Int : 467 ms      Normal sinus rhythm    Possible Left atrial enlargement   Anteroseptal infarct (cited on or before 01-AUG-2020)   Abnormal ECG   When compared with ECG of 03-AUG-2020 06:54,   No significant change was found   Confirmed by Chioma Perez ([id number]) on 12/17/2021 4:27:25 PM      Referred By:            Confirmed By: Chioma Perez            Last echocardiogram:  Results for orders placed during the hospital encounter of 08/01/20    Transthoracic Echo Complete With Contrast if Necessary Per Protocol    Interpretation Summary  · Left ventricular systolic function is normal. Estimated EF appears to be in the range of 51 - 55%  · Left ventricular wall thickness is consistent with severe concentric hypertrophy.  · Left ventricular diastolic dysfunction (grade II) consistent with pseudonormalization.  · Mild mitral valve regurgitation is present  · Mild tricuspid valve regurgitation is present.  · There is no evidence of pericardial effusion.  · No previous study to compare    --------------------------------------------------------------------------------------------------------------------  Labs:  Results from last 7 days   Lab Units 12/17/21  1337 12/17/21  1336   CRP mg/dL  --  3.16*   WBC 10*3/mm3  --  4.62   HEMOGLOBIN g/dL  --  10.2*   HEMATOCRIT %  --  33.3*   MCV fL  --  89.8   MCHC g/dL  --  30.6*   PLATELETS 10*3/mm3  --  154   INR  0.93  --      Results from last 7 days   Lab Units 12/17/21  1314   PH, ARTERIAL pH units 7.314*   PO2 ART mm Hg 83.6   PCO2, ARTERIAL mm Hg 38.8   HCO3 ART mmol/L 19.7*     Results from last 7 days   Lab Units 12/17/21  1336   SODIUM mmol/L 139   POTASSIUM mmol/L 5.3*   MAGNESIUM mg/dL 1.8   CHLORIDE mmol/L 109*   CO2 mmol/L 16.7*   BUN mg/dL 70*   CREATININE mg/dL 7.55*   EGFR IF AFRICN AM mL/min/1.73 9*   CALCIUM mg/dL 7.9*   GLUCOSE mg/dL 95   ALBUMIN g/dL 3.20*   BILIRUBIN mg/dL 0.2   ALK PHOS U/L 63   AST (SGOT) U/L 22   ALT (SGPT) U/L 20   Estimated Creatinine Clearance: 17.5 mL/min (A)  (by C-G formula based on SCr of 7.55 mg/dL (H)).    No results found for: AMMONIA  Results from last 7 days   Lab Units 12/17/21  1536 12/17/21  1336   TROPONIN T ng/mL 0.325* 0.372*   PROBNP pg/mL  --  14,312.0*         No results found for: HGBA1C, POCGLU  Lab Results   Component Value Date    TSH 1.670 08/01/2020    FREET4 1.05 12/21/2015     No results found for: PREGTESTUR, PREGSERUM, HCG, HCGQUANT  Pain Management Panel     Pain Management Panel Latest Ref Rng & Units 12/17/2021 8/3/2020    CREATININE UR mg/dL - 90.2    AMPHETAMINES SCREEN, URINE Negative Negative -    BARBITURATES SCREEN Negative Negative -    BENZODIAZEPINE SCREEN, URINE Negative Negative -    BUPRENORPHINEUR Negative Negative -    COCAINE SCREEN, URINE Negative Negative -    METHADONE SCREEN, URINE Negative Negative -    METHAMPHETAMINEUR Negative Negative -        Brief Urine Lab Results  (Last result in the past 365 days)      Color   Clarity   Blood   Leuk Est   Nitrite   Protein   CREAT   Urine HCG        12/17/21 1835 Yellow   Clear   Small (1+)   Negative   Negative   >=300 mg/dL (3+)               No results found for: BLOODCX  No results found for: URINECX  No results found for: WOUNDCX  No results found for: STOOLCX    I have personally looked at the labs and they are summarized above.  ----------------------------------------------------------------------------------------------------------------------  Detailed radiology reports for the last 24 hours:    Imaging Results (Last 24 Hours)     Procedure Component Value Units Date/Time    CT Abdomen Pelvis Without Contrast [[id number]] Collected: 12/17/21 1553     Updated: 12/17/21 1556    Narrative:      EXAM:    CT Abdomen and Pelvis Without Intravenous Contrast     EXAM DATE:    12/17/2021 3:39 PM     CLINICAL HISTORY:    ANSELMO     TECHNIQUE:    Axial computed tomography images of the abdomen and pelvis without  intravenous contrast.  Sagittal and coronal reformatted images  were  created and reviewed.  This CT exam was performed using one or more of  the following dose reduction techniques:  automated exposure control,  adjustment of the mA and/or kV according to patient size, and/or use of  iterative reconstruction technique.     COMPARISON:    No relevant prior studies available.     FINDINGS:    LUNG BASES:  Unremarkable.  No mass.  No consolidation.      ABDOMEN:    LIVER:  Unremarkable.    GALLBLADDER AND BILE DUCTS:  Unremarkable.  No calcified stones.  No  ductal dilation.    PANCREAS:  Unremarkable.  No ductal dilation.    SPLEEN:  Unremarkable.  No splenomegaly.    ADRENALS:  Unremarkable.  No mass.    KIDNEYS AND URETERS:  Unremarkable.  No obstructing stones.  No  hydronephrosis.    STOMACH AND BOWEL:  Unremarkable.  No obstruction.  No mucosal  thickening.      PELVIS:    APPENDIX:  No findings to suggest acute appendicitis.    BLADDER:  Unremarkable.  No stones.    REPRODUCTIVE:  Unremarkable as visualized.      ABDOMEN and PELVIS:    INTRAPERITONEAL SPACE:  Unremarkable.  No free air.  No significant  fluid collection.    BONES/JOINTS:  No acute fracture.  No dislocation.    SOFT TISSUES:  Unremarkable.    VASCULATURE:  Unremarkable.  No abdominal aortic aneurysm.    LYMPH NODES:  Unremarkable.  No enlarged lymph nodes.       Impression:        No acute findings in the abdomen or pelvis.     This report was finalized on 12/17/2021 3:54 PM by Dr. Stu Simpson MD.       CT Chest Without Contrast Diagnostic [[id number]] Collected: 12/17/21 1553     Updated: 12/17/21 1555    Narrative:      EXAM:    CT Chest Without Intravenous Contrast     EXAM DATE:    12/17/2021 3:39 PM     CLINICAL HISTORY:    SOB     TECHNIQUE:    Axial computed tomography images of the chest without intravenous  contrast.  Sagittal and coronal reformatted images were created and  reviewed.  This CT exam was performed using one or more of the following  dose reduction techniques:  automated exposure  control, adjustment of  the mA and/or kV according to patient size, and/or use of iterative  reconstruction technique.     COMPARISON:    12/22/2015     FINDINGS:    LUNGS:  Unremarkable.  No mass.  No consolidation.    PLEURAL SPACE:  Tiny right pleural effusion.  No pneumothorax.    HEART:  Cardiomegaly.  No significant pericardial effusion.    BONES/JOINTS:  Unremarkable.  No acute fracture.  No dislocation.    SOFT TISSUES:  Unremarkable.    VASCULATURE:  Unremarkable.  No thoracic aortic aneurysm.    LYMPH NODES:  Unremarkable.  No enlarged lymph nodes.       Impression:      1.  Tiny right pleural effusion.  2.  Cardiomegaly.     This report was finalized on 12/17/2021 3:53 PM by Dr. Stu Simpson MD.       XR Chest 1 View [[id number]] Collected: 12/17/21 1434     Updated: 12/17/21 1440    Narrative:      EXAM:    XR Chest, 1 View     EXAM DATE:    12/17/2021 1:52 PM     CLINICAL HISTORY:    Chest pain protocol     TECHNIQUE:    Frontal view of the chest.     COMPARISON:    08/02/2020     FINDINGS:    LUNGS:  Unremarkable.  No consolidation.    PLEURAL SPACE:  Minimal blunting of bilateral costophrenic angle  suggestive of tiny pleural effusion.  No pneumothorax.    HEART:  Cardiomegaly.    MEDIASTINUM:  Unremarkable.    BONES/JOINTS:  Unremarkable.    TUBES, LINES AND DEVICES:  Left Port-A-Cath with tip in SVC.       Impression:      1.  Cardiomegaly.  2.  Minimal blunting of bilateral costophrenic angle suggestive of tiny  pleural effusion.     This report was finalized on 12/17/2021 2:38 PM by Dr. Stu Simpson MD.           Final impressions for the last 30 days of radiology reports:    CT Abdomen Pelvis Without Contrast    Result Date: 12/17/2021    No acute findings in the abdomen or pelvis.  This report was finalized on 12/17/2021 3:54 PM by Dr. Stu Simpson MD.      CT Chest Without Contrast Diagnostic    Result Date: 12/17/2021  1.  Tiny right pleural effusion. 2.  Cardiomegaly.  This report was  finalized on 12/17/2021 3:53 PM by Dr. Stu Simpson MD.      XR Chest 1 View    Result Date: 12/17/2021  1.  Cardiomegaly. 2.  Minimal blunting of bilateral costophrenic angle suggestive of tiny pleural effusion.  This report was finalized on 12/17/2021 2:38 PM by Dr. tSu Simpson MD.      I have personally looked at the radiology images and read the final radiology report.    Assessment & Plan       Acute kidney injury on CKD 3B  Accelerated hypertension    -Patient does not appear volume overloaded at this time however given his heart failure history and some peripheral edema will hold off on administration of IV fluids.    -Suspect hypertensive related and progression of his chronic kidney disease    -Bilateral renal ultrasound.    -Repeat TTE given history of heart failure to ensure no potential component of cardiorenal syndrome.    -Nephrology consultation placed, appreciate input and help    COVID-19 positive    -Not hypoxemic at this time, therefore no indication for Remdesivir dexamethasone    Elevated troponin  Paroxysmal atrial fibrillation on chronic anticoagulation  Heart failure with preserved ejection fraction    -Patient chest pain-free, troponin elevation likely related to his acute renal dysfunction as patient troponin trend in the emergency department is flat.  However patient troponin significantly elevated from prior.    -We will resume patient's home medications as appropriate after medication reconciliation    COPD  Obstructive sleep apnea    -BiPAP ordered for at night usage    -Continue home inhalers once reconciled    VTE Prophylaxis:   Mechanical Order History:     None      Pharmalogical Order History:      Ordered     Dose Route Frequency Stop    12/17/21 1923  heparin (porcine) 5000 UNIT/ML injection 5,000 Units         5,000 Units SC Every 8 Hours Scheduled --                The patient is high risk due to the following diagnoses/reasons: Acute kidney injury on CKD stage IIIb with  concern for progression to CKD stage V/end-stage renal disease in need of potential initiation of dialysis.    Disposition Home pending clinical course.    Jaun Quiles DO  [place] [facility]  12/17/21  20:46 EST

## 2021-12-18 NOTE — PAYOR COMM NOTE
"[facility]  GINETTE MCNEILL  PHONE  [phone]  FAX  [phone]  NPI:  [id number]    PENDING REF#[id number]    Elisha Keene (46 y.o. Male)             Date of Birth Social Security Number Address Home Phone MRN    1975  [address] [address] [phone] [id number]    [employer] Marital Status             None Single       Admission Date Admission Type Admitting Provider Attending Provider Department, Room/Bed    12/17/21 Emergency Jaun Quiles DO Cagle, William, DO [facility] 3 SOUTH, 3321/1P    Discharge Date Discharge Disposition Discharge Destination                         Attending Provider: Jaun Quiles DO    Allergies: No Known Allergies    Isolation: None   Infection: None   Code Status: CPR   Advance Care Planning Activity    Ht: 176.5 cm (69.5\")   Wt: 144 kg (318 lb 6.4 oz)    Admission Cmt: None   Principal Problem: COVID-19 virus detected [U07.1]                 Active Insurance as of 12/17/2021     Primary Coverage     Payor Plan Insurance Group Employer/Plan Group    ANTHEM MEDICARE REPLACEMENT ANTHEM MEDICARE ADVANTAGE [id number]     Payor Plan Address Payor Plan Phone Number Payor Plan Fax Number Effective Dates    [address] [phone]  1/1/2020 - None Entered    [facility] [address]       Subscriber Name Subscriber Birth Date Member ID       ELISHA KEENE 1975 [id number]           Secondary Coverage     Payor Plan Insurance Group Employer/Plan Group    [employer] [place] WELL[place]ARE MEDICAID      Payor Plan Address Payor Plan Phone Number Payor Plan Fax Number Effective Dates    [address] [address] [phone]  8/1/2020 - None Entered    [facility] [id number]       Subscriber Name Subscriber Birth Date Member ID       ELISHA KEENE 1975 [id number]                 Emergency Contacts      (Rel.) Home Phone Work Phone Mobile Phone    HUAN LUI (Other) [phone] -- --    holland caruso (Mother) -- -- [phone]    "            History & Physical      KbJaunDO at 21              [facility] HOSPITALIST HISTORY AND PHYSICAL    Patient Identification:  Name:  Sudarshan Keene  Age:  46 y.o.  Sex:  male  :  1975  MRN:  [id number]   Visit Number:  [id number]  Admit Date: 2021   Room number:  3321/1P  Primary Care Physician:  Dedra Azar MD     Subjective     Chief complaint:    Chief Complaint   Patient presents with   • Exposure To Known Illness   • Shortness of Breath       History of presenting illness:  46 y.o. male with past medical history significant for chronic kidney disease, heart failure, COPD, obstructional sleep apnea, hypertension, paroxysmal atrial fibrillation, nonobstructive coronary artery disease obesity and marijuana usage who presents to the hospital at the recommendation of outside emergency department for further evaluation of acute kidney injury on CKD.  Patient states that he previously presented to [facility] emergency department on 2021 due to feelings of shortness of breath and persistent cough concern for heart failure exacerbation but was found to have COVID-19 positive status.  On evaluation patient also noted to have significantly elevated creatinine greater than 7 above his baseline however at the time there were no beds for the patient at the hospital and no outside facility was accepting patients at the time patient was discharged from the emergency department home.  Patient presented back to the emergency department again today due to feeling worse however again there were no beds available and patient was recommended to present to our emergency department for evaluation for possible admission.  Patient denies any fevers or chills, nausea, vomiting, or diarrhea.  Patient does admit to shortness of breath.  He denies any decrease or increased urinary output or change in his eating habits and admits that he has been eating drinking as  usual and sticking to fluid restriction of 1800 cc daily.  Due to his significantly elevated creatinine patient was admitted to the hospitalist service for evaluation and treatment and for evaluation by nephrology in consultation.  ---------------------------------------------------------------------------------------------------------------------   Review of Systems a 14 system review of system was performed pertinent positives and negatives above in HPI.  ---------------------------------------------------------------------------------------------------------------------   Past Medical History:   Diagnosis Date   • Arthritis    • Asthma    • CHF (congestive heart failure) ([facility])    • COPD (chronic obstructive pulmonary disease) ([facility])    • Coronary artery disease    • Hypertension    • Sleep apnea      Past Surgical History:   Procedure Laterality Date   • CARDIAC CATHETERIZATION  2008   • HERNIA REPAIR  1982     Family History   Problem Relation Age of Onset   • Hypertension Mother    • Hypertension Father    • Hypertension Sister    • Heart disease Sister      Social History     Socioeconomic History   • Marital status: Single   Tobacco Use   • Smoking status: Former Smoker     Packs/day: 0.25     Types: Cigarettes     Quit date: 2014     Years since quittin.3   • Smokeless tobacco: Never Used   Substance and Sexual Activity   • Alcohol use: Never   • Drug use: Yes     Frequency: 7.0 times per week     Types: Marijuana     Comment: tobacco free   • Sexual activity: Yes     Partners: Female     ---------------------------------------------------------------------------------------------------------------------   Allergies:  Patient has no known allergies.  ---------------------------------------------------------------------------------------------------------------------   Medications below are reported home medications pulling from within the system; at this time, these medications have not been  reconciled unless otherwise specified and are in the verification process for further verifcation as current home medications.    Prior to Admission Medications     Prescriptions Last Dose Informant Patient Reported? Taking?    amLODIPine (NORVASC) 10 MG tablet   Yes Yes    Take 10 mg by mouth Daily.    apixaban (ELIQUIS) 5 MG tablet tablet   Yes Yes    Take 5 mg by mouth 2 (Two) Times a Day.    atorvastatin (LIPITOR) 40 MG tablet 12/16/2021 Spouse/Significant Other Yes Yes    Take 40 mg by mouth Every Night.    bumetanide (BUMEX) 1 MG tablet 12/17/2021  No Yes    Take 1 tablet by mouth Daily.    buPROPion XL (WELLBUTRIN XL) 300 MG 24 hr tablet 12/17/2021  Yes Yes    Take 300 mg by mouth Every Morning.    ezetimibe (ZETIA) 10 MG tablet 12/17/2021  Yes Yes    Take 10 mg by mouth Daily.    ferrous sulfate 325 (65 FE) MG tablet 12/17/2021  Yes Yes    Take 325 mg by mouth Daily With Breakfast.    isosorbide mononitrate (IMDUR) 120 MG 24 hr tablet 12/17/2021 Spouse/Significant Other Yes Yes    Take 120 mg by mouth Daily.    minoxidil (LONITEN) 10 MG tablet 12/16/2021  Yes Yes    Take 10 mg by mouth Every Night.    predniSONE (DELTASONE) 5 MG tablet   Yes Yes    Take 5 mg by mouth Daily.    ALPRAZolam (XANAX) 1 MG tablet  Spouse/Significant Other Yes No    Take 1 mg by mouth 2 (Two) Times a Day As Needed for Anxiety.    aspirin 81 MG EC tablet  Spouse/Significant Other Yes No    Take 81 mg by mouth Daily.    buPROPion SR (WELLBUTRIN SR) 100 MG 12 hr tablet  Spouse/Significant Other Yes No    Take 200 mg by mouth Daily.    cloNIDine (CATAPRES) 0.2 MG tablet  Spouse/Significant Other Yes No    Take 0.3 mg by mouth 3 (Three) Times a Day.    labetalol (NORMODYNE) 300 MG tablet  Spouse/Significant Other Yes No    Take 300 mg by mouth 3 (Three) Times a Day.    montelukast (SINGULAIR) 10 MG tablet  Spouse/Significant Other Yes No    Take 10 mg by mouth Every Night.    tamsulosin (FLOMAX) 0.4 MG capsule 24 hr capsule   Spouse/Significant Other Yes No    Take 1 capsule by mouth Every Night.        Objective     Vital Signs:  Temp:  [96.2 °F (35.7 °C)-98.5 °F (36.9 °C)] 96.2 °F (35.7 °C)  Heart Rate:  [72-85] 74  Resp:  [16-18] 18  BP: (119-190)/() 138/108    Mean Arterial Pressure (Non-Invasive) for the past 24 hrs (Last 3 readings):   Noninvasive MAP (mmHg)   12/17/21 1928 132   12/17/21 1743 127   12/17/21 1724 138     SpO2:  [91 %-100 %] 95 %  on   ;      Body mass index is 46.43 kg/m².    Wt Readings from Last 3 Encounters:   12/17/21 (!) 145 kg (318 lb 15.4 oz)   08/04/20 (!) 166 kg (366 lb 11.2 oz)      ---------------------------------------------------------------------------------------------------------------------   Physical Exam:  Constitutional:  Well-developed and well-nourished.  No respiratory distress.      HENT:  Head: Normocephalic and atraumatic.  Mouth:  Moist mucous membranes.    Eyes:  Conjunctivae and EOM are normal.  Pupils are equal, round, and reactive to light.  No scleral icterus.  Cardiovascular:  Normal rate, regular rhythm and normal heart sounds with no murmur.  Pulmonary/Chest:  No respiratory distress, no wheezes, no crackles, with normal breath sounds and good air movement.  Abdominal:  Soft.  Bowel sounds are normal.  No distension and no tenderness.   Musculoskeletal:  No edema, no tenderness, and no deformity.  No red or swollen joints anywhere.    Neurological:  Alert and oriented to person, place, and time.  No cranial nerve deficit.   Skin:  Skin is warm and dry.  No rash noted.  No pallor.   Peripheral vascular:  No edema and pulses on all 4 extremities.    ---------------------------------------------------------------------------------------------------------------------  EKG:    ECG 12 Lead   Final Result   Test Reason : Chest Pain   Blood Pressure :   */*   mmHG   Vent. Rate :  78 BPM     Atrial Rate :  78 BPM      P-R Int : 168 ms          QRS Dur : 100 ms       QT Int : 410 ms        P-R-T Axes :  52 -25  26 degrees      QTc Int : 467 ms      Normal sinus rhythm   Possible Left atrial enlargement   Anteroseptal infarct (cited on or before 01-AUG-2020)   Abnormal ECG   When compared with ECG of 03-AUG-2020 06:54,   No significant change was found   Confirmed by Chioma Perez ([id number]) on 12/17/2021 4:27:25 PM      Referred By:            Confirmed By: Chioma Perez            Last echocardiogram:  Results for orders placed during the hospital encounter of 08/01/20    Transthoracic Echo Complete With Contrast if Necessary Per Protocol    Interpretation Summary  · Left ventricular systolic function is normal. Estimated EF appears to be in the range of 51 - 55%  · Left ventricular wall thickness is consistent with severe concentric hypertrophy.  · Left ventricular diastolic dysfunction (grade II) consistent with pseudonormalization.  · Mild mitral valve regurgitation is present  · Mild tricuspid valve regurgitation is present.  · There is no evidence of pericardial effusion.  · No previous study to compare    --------------------------------------------------------------------------------------------------------------------  Labs:  Results from last 7 days   Lab Units 12/17/21  1337 12/17/21  1336   CRP mg/dL  --  3.16*   WBC 10*3/mm3  --  4.62   HEMOGLOBIN g/dL  --  10.2*   HEMATOCRIT %  --  33.3*   MCV fL  --  89.8   MCHC g/dL  --  30.6*   PLATELETS 10*3/mm3  --  154   INR  0.93  --      Results from last 7 days   Lab Units 12/17/21  1314   PH, ARTERIAL pH units 7.314*   PO2 ART mm Hg 83.6   PCO2, ARTERIAL mm Hg 38.8   HCO3 ART mmol/L 19.7*     Results from last 7 days   Lab Units 12/17/21  1336   SODIUM mmol/L 139   POTASSIUM mmol/L 5.3*   MAGNESIUM mg/dL 1.8   CHLORIDE mmol/L 109*   CO2 mmol/L 16.7*   BUN mg/dL 70*   CREATININE mg/dL 7.55*   EGFR IF AFRICN AM mL/min/1.73 9*   CALCIUM mg/dL 7.9*   GLUCOSE mg/dL 95   ALBUMIN g/dL 3.20*   BILIRUBIN mg/dL 0.2   ALK PHOS U/L 63   AST  (SGOT) U/L 22   ALT (SGPT) U/L 20   Estimated Creatinine Clearance: 17.5 mL/min (A) (by C-G formula based on SCr of 7.55 mg/dL (H)).    No results found for: AMMONIA  Results from last 7 days   Lab Units 12/17/21  1536 12/17/21  1336   TROPONIN T ng/mL 0.325* 0.372*   PROBNP pg/mL  --  14,312.0*         No results found for: HGBA1C, POCGLU  Lab Results   Component Value Date    TSH 1.670 08/01/2020    FREET4 1.05 12/21/2015     No results found for: PREGTESTUR, PREGSERUM, HCG, HCGQUANT  Pain Management Panel     Pain Management Panel Latest Ref Rng & Units 12/17/2021 8/3/2020    CREATININE UR mg/dL - 90.2    AMPHETAMINES SCREEN, URINE Negative Negative -    BARBITURATES SCREEN Negative Negative -    BENZODIAZEPINE SCREEN, URINE Negative Negative -    BUPRENORPHINEUR Negative Negative -    COCAINE SCREEN, URINE Negative Negative -    METHADONE SCREEN, URINE Negative Negative -    METHAMPHETAMINEUR Negative Negative -        Brief Urine Lab Results  (Last result in the past 365 days)      Color   Clarity   Blood   Leuk Est   Nitrite   Protein   CREAT   Urine HCG        12/17/21 1835 Yellow   Clear   Small (1+)   Negative   Negative   >=300 mg/dL (3+)               No results found for: BLOODCX  No results found for: URINECX  No results found for: WOUNDCX  No results found for: STOOLCX    I have personally looked at the labs and they are summarized above.  ----------------------------------------------------------------------------------------------------------------------  Detailed radiology reports for the last 24 hours:    Imaging Results (Last 24 Hours)     Procedure Component Value Units Date/Time    CT Abdomen Pelvis Without Contrast [[id number]] Collected: 12/17/21 5763     Updated: 12/17/21 8666    Narrative:      EXAM:    CT Abdomen and Pelvis Without Intravenous Contrast     EXAM DATE:    12/17/2021 3:39 PM     CLINICAL HISTORY:    ANSELMO     TECHNIQUE:    Axial computed tomography images of the abdomen and  pelvis without  intravenous contrast.  Sagittal and coronal reformatted images were  created and reviewed.  This CT exam was performed using one or more of  the following dose reduction techniques:  automated exposure control,  adjustment of the mA and/or kV according to patient size, and/or use of  iterative reconstruction technique.     COMPARISON:    No relevant prior studies available.     FINDINGS:    LUNG BASES:  Unremarkable.  No mass.  No consolidation.      ABDOMEN:    LIVER:  Unremarkable.    GALLBLADDER AND BILE DUCTS:  Unremarkable.  No calcified stones.  No  ductal dilation.    PANCREAS:  Unremarkable.  No ductal dilation.    SPLEEN:  Unremarkable.  No splenomegaly.    ADRENALS:  Unremarkable.  No mass.    KIDNEYS AND URETERS:  Unremarkable.  No obstructing stones.  No  hydronephrosis.    STOMACH AND BOWEL:  Unremarkable.  No obstruction.  No mucosal  thickening.      PELVIS:    APPENDIX:  No findings to suggest acute appendicitis.    BLADDER:  Unremarkable.  No stones.    REPRODUCTIVE:  Unremarkable as visualized.      ABDOMEN and PELVIS:    INTRAPERITONEAL SPACE:  Unremarkable.  No free air.  No significant  fluid collection.    BONES/JOINTS:  No acute fracture.  No dislocation.    SOFT TISSUES:  Unremarkable.    VASCULATURE:  Unremarkable.  No abdominal aortic aneurysm.    LYMPH NODES:  Unremarkable.  No enlarged lymph nodes.       Impression:        No acute findings in the abdomen or pelvis.     This report was finalized on 12/17/2021 3:54 PM by Dr. Stu Simpson MD.       CT Chest Without Contrast Diagnostic [[id number]] Collected: 12/17/21 1553     Updated: 12/17/21 1555    Narrative:      EXAM:    CT Chest Without Intravenous Contrast     EXAM DATE:    12/17/2021 3:39 PM     CLINICAL HISTORY:    SOB     TECHNIQUE:    Axial computed tomography images of the chest without intravenous  contrast.  Sagittal and coronal reformatted images were created and  reviewed.  This CT exam was performed using  one or more of the following  dose reduction techniques:  automated exposure control, adjustment of  the mA and/or kV according to patient size, and/or use of iterative  reconstruction technique.     COMPARISON:    12/22/2015     FINDINGS:    LUNGS:  Unremarkable.  No mass.  No consolidation.    PLEURAL SPACE:  Tiny right pleural effusion.  No pneumothorax.    HEART:  Cardiomegaly.  No significant pericardial effusion.    BONES/JOINTS:  Unremarkable.  No acute fracture.  No dislocation.    SOFT TISSUES:  Unremarkable.    VASCULATURE:  Unremarkable.  No thoracic aortic aneurysm.    LYMPH NODES:  Unremarkable.  No enlarged lymph nodes.       Impression:      1.  Tiny right pleural effusion.  2.  Cardiomegaly.     This report was finalized on 12/17/2021 3:53 PM by Dr. Stu Simpson MD.       XR Chest 1 View [[id number]] Collected: 12/17/21 1434     Updated: 12/17/21 1440    Narrative:      EXAM:    XR Chest, 1 View     EXAM DATE:    12/17/2021 1:52 PM     CLINICAL HISTORY:    Chest pain protocol     TECHNIQUE:    Frontal view of the chest.     COMPARISON:    08/02/2020     FINDINGS:    LUNGS:  Unremarkable.  No consolidation.    PLEURAL SPACE:  Minimal blunting of bilateral costophrenic angle  suggestive of tiny pleural effusion.  No pneumothorax.    HEART:  Cardiomegaly.    MEDIASTINUM:  Unremarkable.    BONES/JOINTS:  Unremarkable.    TUBES, LINES AND DEVICES:  Left Port-A-Cath with tip in SVC.       Impression:      1.  Cardiomegaly.  2.  Minimal blunting of bilateral costophrenic angle suggestive of tiny  pleural effusion.     This report was finalized on 12/17/2021 2:38 PM by Dr. Stu Simpson MD.           Final impressions for the last 30 days of radiology reports:    CT Abdomen Pelvis Without Contrast    Result Date: 12/17/2021    No acute findings in the abdomen or pelvis.  This report was finalized on 12/17/2021 3:54 PM by Dr. Stu Simpson MD.      CT Chest Without Contrast Diagnostic    Result Date:  12/17/2021  1.  Tiny right pleural effusion. 2.  Cardiomegaly.  This report was finalized on 12/17/2021 3:53 PM by Dr. Stu Simpson MD.      XR Chest 1 View    Result Date: 12/17/2021  1.  Cardiomegaly. 2.  Minimal blunting of bilateral costophrenic angle suggestive of tiny pleural effusion.  This report was finalized on 12/17/2021 2:38 PM by Dr. Stu Simpson MD.      I have personally looked at the radiology images and read the final radiology report.    Assessment & Plan       Acute kidney injury on CKD 3B  Accelerated hypertension    -Patient does not appear volume overloaded at this time however given his heart failure history and some peripheral edema will hold off on administration of IV fluids.    -Suspect hypertensive related and progression of his chronic kidney disease    -Bilateral renal ultrasound.    -Repeat TTE given history of heart failure to ensure no potential component of cardiorenal syndrome.    -Nephrology consultation placed, appreciate input and help    COVID-19 positive    -Not hypoxemic at this time, therefore no indication for Remdesivir dexamethasone    Elevated troponin  Paroxysmal atrial fibrillation on chronic anticoagulation  Heart failure with preserved ejection fraction    -Patient chest pain-free, troponin elevation likely related to his acute renal dysfunction as patient troponin trend in the emergency department is flat.  However patient troponin significantly elevated from prior.    -We will resume patient's home medications as appropriate after medication reconciliation    COPD  Obstructive sleep apnea    -BiPAP ordered for at night usage    -Continue home inhalers once reconciled    VTE Prophylaxis:   Mechanical Order History:     None      Pharmalogical Order History:      Ordered     Dose Route Frequency Stop    12/17/21 1923  heparin (porcine) 5000 UNIT/ML injection 5,000 Units         5,000 Units SC Every 8 Hours Scheduled --                The patient is high risk due to  the following diagnoses/reasons: Acute kidney injury on CKD stage IIIb with concern for progression to CKD stage V/end-stage renal disease in need of potential initiation of dialysis.    Disposition Home pending clinical course.    Jaun Quiles DO  [facility]  12/17/21  20:46 EST      Electronically signed by Jaun Quiles DO at 12/17/21 2105          Emergency Department Notes      Kenneth Schroeder PA-C at 12/17/21 1259     Attestation signed by Jaquan Casas MD at 12/17/21 2108    I was available to the NP or PA for any questions, concerns, or assistance requested regarding this patient encounter.    Electronically signed by Jaquan Casas MD, 12/17/21, 9:08 PM EST.                           MEDICAL SCREENING:    Reason for Visit: shortness of breath and chest pain. Tested positive for COVID on Sunday. Fully vaccinated.    Patient initially seen in triage.  The patient was advised further evaluation and diagnostic testing will be needed, some of the treatment and testing will be initiated in the lobby in order to begin the process.  The patient will be returned to the waiting area for the time being and possibly be re-assessed by a subsequent ED provider.  The patient will be brought back to the treatment area in as timely manner as possible.       Kenneth Schroeder PA-C  12/17/21 1300      Electronically signed by Jaquan Casas MD at 12/17/21 2108     Peggy Alonzo, RN at 12/17/21 1423        Pt sitting in lobby, nad noted, respirations nonlabored and even, awake and alert     Peggy Alonzo, RN  12/17/21 1423      Electronically signed by Peggy Alonzo, RN at 12/17/21 1423     Kyle Flores PCT at 12/17/21 1502        [facility] called to fax er visit records     Kyle Flores PCT  12/17/21 1504       Kyle Flores PCT  12/17/21 1504      Electronically signed by Kyle Flores PCT at 12/17/21 1504     Camille Mcfarlane,  PA at 12/17/21 2325     Attestation signed by Jaquan Casas MD at 12/17/21 3091    I was available to the NP or PA for any questions, concerns, or assistance requested regarding this patient encounter.    Electronically signed by Jaquan Casas MD, 12/17/21, 9:08 PM EST.                    Subjective   45 yo male patient presents to the ED with complaints of SOB and chest pain. Patient states that he has felt like this for 5 days. Reports that he went to [facility] on Sunday and was swabbed positive for COVID. He states that they wanted to admit him but had no beds and no where to transfer, so he was discharged home. Continued to feel worse so he returned today. He was recommended to come here as they still did not have beds. Pt states he has hx of CHF, COPD, CKD - no dialysis, HTN, KATHLEEN, and CAD.       History provided by:  Patient   used: No        Review of Systems   HENT: Negative.    Eyes: Negative.    Respiratory: Positive for chest tightness and shortness of breath.    Cardiovascular: Negative.    Gastrointestinal: Negative.    Endocrine: Negative.    Genitourinary: Negative.    Musculoskeletal: Negative.    Skin: Negative.    Allergic/Immunologic: Negative.    Neurological: Negative.    Hematological: Negative.    Psychiatric/Behavioral: Negative.    All other systems reviewed and are negative.      Past Medical History:   Diagnosis Date   • Arthritis    • Asthma    • CHF (congestive heart failure) (CMS/[facility])    • COPD (chronic obstructive pulmonary disease) (CMS/[facility])    • Coronary artery disease    • Hypertension    • Sleep apnea        No Known Allergies    Past Surgical History:   Procedure Laterality Date   • CARDIAC CATHETERIZATION  2008 2006   • HERNIA REPAIR  1982       Family History   Problem Relation Age of Onset   • Hypertension Mother    • Hypertension Father    • Hypertension Sister    • Heart disease Sister        Social History     Socioeconomic History   • Marital  status: Single   Tobacco Use   • Smoking status: Former Smoker     Packs/day: 0.25     Types: Cigarettes     Quit date: 2014     Years since quittin.3   • Smokeless tobacco: Never Used   Substance and Sexual Activity   • Alcohol use: Never   • Drug use: Yes     Frequency: 7.0 times per week     Types: Marijuana     Comment: tobacco free   • Sexual activity: Yes     Partners: Female           Objective   Physical Exam  Vitals and nursing note reviewed.   Constitutional:       Appearance: He is well-developed and normal weight.   HENT:      Head: Normocephalic and atraumatic.      Mouth/Throat:      Mouth: Mucous membranes are moist.      Pharynx: Oropharynx is clear.   Eyes:      Extraocular Movements: Extraocular movements intact.      Pupils: Pupils are equal, round, and reactive to light.   Cardiovascular:      Rate and Rhythm: Normal rate and regular rhythm.      Pulses: Normal pulses.      Heart sounds: Normal heart sounds.   Pulmonary:      Effort: Pulmonary effort is normal.      Breath sounds: Normal breath sounds.   Abdominal:      General: Bowel sounds are normal.      Palpations: Abdomen is soft.   Musculoskeletal:         General: Normal range of motion.      Cervical back: Normal range of motion and neck supple.   Skin:     General: Skin is warm and dry.      Capillary Refill: Capillary refill takes less than 2 seconds.   Neurological:      General: No focal deficit present.      Mental Status: He is alert and oriented to person, place, and time.   Psychiatric:         Mood and Affect: Mood normal.         Behavior: Behavior normal.         Procedures          ED Course  ED Course as of 12/17/21 1842   Fri Dec 17, 2021   1329 EKG at 1327 hrs. NSR 78 bpm, , , QTc 467, regular axis, Q waves in the anteroseptal leads, no significant ST deviation concerning for acute ischemia. [KP]   1455 XR Chest 1 View    IMPRESSION:  1.  Cardiomegaly.  2.  Minimal blunting of bilateral costophrenic  angle suggestive of tiny  pleural effusion.     This report was finalized on 12/17/2021 2:38 PM by Dr. Stu Simpson MD.             Specimen Collected: 12/17/21 14:34 Last Resulted: 12/17/21 14:38           [ML]   1556 CT Chest Without Contrast Diagnostic  IMPRESSION:  1.  Tiny right pleural effusion.  2.  Cardiomegaly.     This report was finalized on 12/17/2021 3:53 PM by Dr. Stu Simpson MD.             Specimen Collected: 12/17/21 15:53 Last Resulted: 12/17/21 15:53         [ML]   1557 CT Abdomen Pelvis Without Contrast  IMPRESSION:    No acute findings in the abdomen or pelvis.     This report was finalized on 12/17/2021 3:54 PM by Dr. Stu Simpson MD.             Specimen Collected: 12/17/21 15:53 Last Resulted: 12/17/21 15:54         [ML]   1627 Attempted to contact Dr. Shultz - he told me that he was not on call until 5PM. I reviewed the call list it has him listed for call all day. Derrick Garcia Super contacted concerned for a mistake.  He states to just wait until 5 and call back.   [ML]   1629 Paged hospitalist  [ML]   1653 Discussed with Marion - he will admit  [ML]   1703 I have positive COVID results from [place] attached to paper chart.   [ML]   1827 PT due for BP medications. He takes Clonidine 0.3mg and Labetalol 300mg  [ML]      ED Course User Index  [KP] Jaquan Casas MD  [ML] Camille Mcfarlane PA                                                 MDM  Number of Diagnoses or Management Options     Amount and/or Complexity of Data Reviewed  Clinical lab tests: ordered and reviewed  Tests in the radiology section of CPT®: ordered and reviewed  Decide to obtain previous medical records or to obtain history from someone other than the patient: yes  Review and summarize past medical records: yes  Discuss the patient with other providers: yes    Risk of Complications, Morbidity, and/or Mortality  Presenting problems: moderate  Diagnostic procedures: moderate  Management options:  moderate    Patient Progress  Patient progress: stable      Final diagnoses:   ANSELMO (acute kidney injury) (HCC)   COVID-19       ED Disposition  ED Disposition     ED Disposition Condition Comment    Decision to Admit            No follow-up provider specified.       Medication List      No changes were made to your prescriptions during this visit.          Camille Mcfarlane PA  12/17/21 1704       Camille Mcfarlane PA  12/17/21 1842      Electronically signed by Jaquan Casas MD at 12/17/21 2108         Current Facility-Administered Medications   Medication Dose Route Frequency Provider Last Rate Last Admin   • ALPRAZolam (XANAX) tablet 1 mg  1 mg Oral Nightly Jaun Quiles DO   1 mg at 12/17/21 2221   • amLODIPine (NORVASC) tablet 10 mg  10 mg Oral Daily Jaun Quiles DO   10 mg at 12/18/21 0809   • apixaban (ELIQUIS) tablet 5 mg  5 mg Oral BID Jaun Quiles DO   5 mg at 12/18/21 0810   • atorvastatin (LIPITOR) tablet 40 mg  40 mg Oral Nightly Jaun Quiles DO   40 mg at 12/17/21 2221   • buPROPion XL (WELLBUTRIN XL) 24 hr tablet 300 mg  300 mg Oral QAM Jaun Quiles DO   300 mg at 12/18/21 0606   • cloNIDine (CATAPRES) tablet 0.3 mg  0.3 mg Oral TID Jaun Quiles DO   0.3 mg at 12/18/21 0810   • ferrous sulfate tablet 325 mg  325 mg Oral Daily With Breakfast Jaun Quiles DO   325 mg at 12/18/21 0809   • isosorbide mononitrate (IMDUR) 24 hr tablet 120 mg  120 mg Oral Daily Jaun Quiles DO   120 mg at 12/18/21 0809   • labetalol (NORMODYNE) tablet 300 mg  300 mg Oral TID Jaun Quiles DO   300 mg at 12/18/21 0810   • minoxidil (LONITEN) tablet 10 mg  10 mg Oral Nightly Jaun Quiles DO   10 mg at 12/17/21 2221   • montelukast (SINGULAIR) tablet 10 mg  10 mg Oral Nightly Jaun Quiles DO   10 mg at 12/17/21 2221   • sodium chloride 0.9 % flush 10 mL  10 mL Intravenous PRN Camille Mcfarlane PA       • sodium chloride 0.9 % flush 10 mL  10 mL Intravenous Q12H Jaun Quiles DO    10 mL at 12/18/21 0811   • sodium chloride 0.9 % flush 10 mL  10 mL Intravenous PRN Jaun Quiles DO       • tamsulosin (FLOMAX) 24 hr capsule 0.4 mg  0.4 mg Oral Daily Jaun Quiles DO   0.4 mg at 12/18/21 0809     Orders (last 24 hrs)      Start     Ordered    12/19/21 0400  CBC & Differential  Morning Draw         12/18/21 0948    12/19/21 0400  Comprehensive Metabolic Panel  Morning Draw         12/18/21 0948    12/19/21 0400  Magnesium  Morning Draw         12/18/21 0948    12/18/21 1130  albumin human 25 % IV SOLN 25 g  Once         12/18/21 0958    12/18/21 0948  Sodium, Urine, Random - Urine, Clean Catch  Once         12/18/21 0948    12/18/21 0948  Chloride, Urine, Random - Urine, Clean Catch  Once         12/18/21 0948    12/18/21 0948  Potassium, Urine, Random - Urine, Clean Catch  Once         12/18/21 0948    12/18/21 0948  Protein / Creatinine Ratio, Urine - Urine, Clean Catch  Once         12/18/21 0948    12/18/21 0948  US Renal Bilateral  1 Time Imaging         12/18/21 0948    12/18/21 0926  Patient Isolation Enhanced Droplet / Contact  Continuous         12/18/21 0925    12/18/21 0925  Obtain Medical Records  Until Discontinued        Comments: Obtain last ER visit notes and last inpatient documentation at [facility]    12/18/21 0925    12/18/21 0900  tamsulosin (FLOMAX) 24 hr capsule 0.4 mg  Daily         12/17/21 2106 12/18/21 0900  isosorbide mononitrate (IMDUR) 24 hr tablet 120 mg  Daily         12/17/21 2106 12/18/21 0900  amLODIPine (NORVASC) tablet 10 mg  Daily         12/17/21 2106 12/18/21 0800  ferrous sulfate tablet 325 mg  Daily With Breakfast         12/17/21 2106 12/18/21 0700  buPROPion XL (WELLBUTRIN XL) 24 hr tablet 300 mg  Every Morning         12/17/21 2106 12/18/21 0600  Document Pulse Oximetry - On Room Air / Home O2 Level  Daily      Comments: Reapply Oxygen if O2 Sat Drops Below 88%    12/17/21 1923    12/18/21 0600  CBC & Differential   Morning Draw         12/17/21 2104 12/18/21 0600  Comprehensive Metabolic Panel  Morning Draw         12/17/21 2104 12/18/21 0600  CBC Auto Differential  PROCEDURE ONCE         12/17/21 2210 12/17/21 2319  Inpatient Case Management  Consult  Once        Provider:  (Not yet assigned)    12/17/21 2320 12/17/21 2200  heparin (porcine) 5000 UNIT/ML injection 5,000 Units  Every 8 Hours Scheduled,   Status:  Discontinued         12/17/21 1923 12/17/21 2200  labetalol (NORMODYNE) tablet 300 mg  3 Times Daily         12/17/21 2106 12/17/21 2200  atorvastatin (LIPITOR) tablet 40 mg  Nightly         12/17/21 2106 12/17/21 2200  montelukast (SINGULAIR) tablet 10 mg  Nightly         12/17/21 2106 12/17/21 2200  ALPRAZolam (XANAX) tablet 1 mg  Nightly         12/17/21 2106 12/17/21 2200  apixaban (ELIQUIS) tablet 5 mg  2 Times Daily         12/17/21 2106 12/17/21 2200  minoxidil (LONITEN) tablet 10 mg  Nightly         12/17/21 2106 12/17/21 2200  cloNIDine (CATAPRES) tablet 0.3 mg  3 Times Daily         12/17/21 2106 12/17/21 2100  sodium chloride 0.9 % flush 10 mL  Every 12 Hours Scheduled         12/17/21 1923 12/17/21 2045  US Renal Bilateral  1 Time Imaging         12/17/21 2045 12/17/21 2042  NIPPV (CPAP or BIPAP)  At Bedtime & As Needed-RT        Comments: May adjust IPAP and EPAP for patient comfort    12/17/21 2043 12/17/21 2000  Vital Signs  Every 4 Hours       12/17/21 1923 12/17/21 1924  Intake & Output  Every Shift       12/17/21 1923 12/17/21 1924  Weigh Patient  Once         12/17/21 1923 12/17/21 1924  Oxygen Therapy- Nasal Cannula; Titrate for SPO2: 90% - 95%  Continuous         12/17/21 1923 12/17/21 1924  Insert Peripheral IV  Once         12/17/21 1923 12/17/21 1924  Saline Lock & Maintain IV Access  Continuous         12/17/21 1923 12/17/21 1924  Pulse Oximetry, Continuous  Continuous         12/17/21 1923 12/17/21 1924  Cardiac  "Monitoring  Continuous,   Status:  Canceled         12/17/21 1923    12/17/21 1924  Patient Isolation Enhanced Droplet / Contact  Continuous,   Status:  Canceled         12/17/21 1923    12/17/21 1924  Diet Regular; Renal, Cardiac  Diet Effective Now         12/17/21 1923    12/17/21 1924  Inpatient Nephrology Consult  Once        Specialty:  Nephrology  Provider:  Tom Shultz MD    12/17/21 1923    12/17/21 1924  Adult Transthoracic Echo Complete w/ Color, Spectral and Contrast if necessary per protocol  Once         12/17/21 1923    12/17/21 1924  Vital Signs Every 15 Minutes Until Stable, Then Every 4 Hours  Per Order Details        Comments: Change Frequency to Every 4 Hours When Stable    12/17/21 1923    12/17/21 1924  Cardiac Monitoring  Continuous         12/17/21 1923 12/17/21 1924  Intake & Output  Every Shift       12/17/21 1923    12/17/21 1924  Weigh Patient  Once         12/17/21 1923    12/17/21 1924  Notify Provider (Specify Parameters)  Until Discontinued         12/17/21 1923 12/17/21 1924  VTE Prophylaxis Not Indicated: Other: Patient Currently Anticoagulated / Receiving Prophylaxis  Once         12/17/21 1923    12/17/21 1924  Indicate COVID Diagnosis For Problem List: COVID-19 virus detected  Once         12/17/21 1923 12/17/21 1923  sodium chloride 0.9 % flush 10 mL  As Needed         12/17/21 1923    12/17/21 1901  Urinalysis, Microscopic Only - Urine, Clean Catch  Once         12/17/21 1900    12/17/21 1829  labetalol (NORMODYNE) tablet 300 mg  Once         12/17/21 1827    12/17/21 1828  cloNIDine (CATAPRES) tablet 0.3 mg  Once         12/17/21 1826    12/17/21 1704  Inpatient Admission  Once         12/17/21 1707    12/17/21 1704  Code Status and Medical Interventions:  Continuous         12/17/21 1707    12/17/21 1659  Blood Culture - Blood, Arm, Left  Once        \"And\" Linked Group Details    12/17/21 1658    12/17/21 1659  Blood Culture - Blood, Arm, Right  Once      " "  Comments: From a different site than #1.     \"And\" Linked Group Details    12/17/21 1658    12/17/21 1658  Urine Drug Screen - Urine, Clean Catch  STAT         12/17/21 1658    12/17/21 1655  Insert peripheral IV  Once        \"And\" Linked Group Details    12/17/21 1654    12/17/21 1654  sodium chloride 0.9 % flush 10 mL  As Needed        \"And\" Linked Group Details    12/17/21 1654    12/17/21 1653  Urinalysis With Culture If Indicated - Urine, Clean Catch  Once         12/17/21 1652    Unscheduled  Up in Chair  As Needed       12/17/21 1923    --  ferrous sulfate 325 (65 FE) MG tablet  Daily With Breakfast         12/17/21 1824    --  predniSONE (DELTASONE) 5 MG tablet  Daily         12/17/21 1824    --  amLODIPine (NORVASC) 10 MG tablet  Daily         12/17/21 1824    --  apixaban (ELIQUIS) 5 MG tablet tablet  2 Times Daily         12/17/21 1824    --  minoxidil (LONITEN) 10 MG tablet  Nightly         12/17/21 1824    --  ezetimibe (ZETIA) 10 MG tablet  Daily         12/17/21 1824    --  buPROPion XL (WELLBUTRIN XL) 300 MG 24 hr tablet  Every Morning         12/17/21 2046    --  cloNIDine (CATAPRES) 0.3 MG tablet  3 Times Daily         12/17/21 2048                Physician Progress Notes (last 24 hours)  Notes from 12/17/21 1626 through 12/18/21 1626   No notes of this type exist for this encounter.            Consult Notes (last 24 hours)      Tom Shultz MD at 12/18/21 0830          Referring Provider: Dr. Gillis  Reason for Consultation: Acute kidney injury on chronic kidney disease stage IV    Chief complaint shortness of breath    Subjective .     History of present illness:  46 y.o. male with past medical history significant for chronic kidney disease, heart failure, COPD, obstructional sleep apnea, hypertension, paroxysmal atrial fibrillation, nonobstructive coronary artery disease obesity and marijuana usage who presents to the hospital at the recommendation of outside emergency department for " further evaluation of acute kidney injury on CKD stage IV.  Patient states that she follows Dr. Cardenas as outpatient and has been told that he may need dialysis sooner than later.  Patient states that he previously presented to [facility] emergency department on 12/12/2021 due to feelings of shortness of breath and persistent cough concern for heart failure exacerbation but was found to have COVID-19 positive status.  On evaluation patient also noted to have significantly elevated creatinine greater than 7 above his baseline however at the time there were no beds for the patient at the hospital and no outside facility was accepting patients at the time patient was discharged from the emergency department home.  Patient presented back to the emergency department again today due to feeling worse however again there were no beds available and patient was recommended to present to our emergency department for evaluation for possible admission.  Patient denies any fevers or chills, nausea, vomiting, or diarrhea.  Patient does admit to shortness of breath.  He denies any decrease or increased urinary output or change in his eating habits and admits that he has been eating drinking as usual and sticking to fluid restriction of 1800 cc daily.  Review of Systems  All systems were reviewed and negative except for above    History  Past Medical History:   Diagnosis Date   • Arthritis    • Asthma    • CHF (congestive heart failure) (HCC)    • COPD (chronic obstructive pulmonary disease) (HCC)    • Coronary artery disease    • Hypertension    • Sleep apnea    ,   Past Surgical History:   Procedure Laterality Date   • CARDIAC CATHETERIZATION  2008 2006   • HERNIA REPAIR  1982   ,   Family History   Problem Relation Age of Onset   • Hypertension Mother    • Hypertension Father    • Hypertension Sister    • Heart disease Sister    ,   Social History     Tobacco Use   • Smoking status: Former Smoker     Packs/day: 0.25      Types: Cigarettes     Quit date: 2014     Years since quittin.3   • Smokeless tobacco: Never Used   Substance Use Topics   • Alcohol use: Never   • Drug use: Yes     Frequency: 7.0 times per week     Types: Marijuana     Comment: tobacco free    and Allergies:  Patient has no known allergies.    Objective     Lab Results (last 24 hours)     Procedure Component Value Units Date/Time    Comprehensive Metabolic Panel [[id number]]  (Abnormal) Collected: 21    Specimen: Blood Updated: 21     Glucose 114 mg/dL      BUN 73 mg/dL      Creatinine 7.00 mg/dL      Sodium 134 mmol/L      Potassium 5.0 mmol/L      Comment: Specimen hemolyzed.  Results may be affected. 1+ Hemolysis         Chloride 107 mmol/L      CO2 16.4 mmol/L      Calcium 7.8 mg/dL      Total Protein 5.5 g/dL      Albumin 2.55 g/dL      ALT (SGPT) 16 U/L      Comment: Specimen hemolyzed.  Results may be affected.        AST (SGOT) 20 U/L      Alkaline Phosphatase 55 U/L      Total Bilirubin 0.2 mg/dL      eGFR Non  Amer --     Comment: <15 Indicative of kidney failure.        eGFR  African Amer 10 mL/min/1.73      Comment: <15 Indicative of kidney failure.        Globulin 3.0 gm/dL      A/G Ratio 0.9 g/dL      BUN/Creatinine Ratio 10.4     Anion Gap 10.6 mmol/L     Narrative:      GFR Normal >60  Chronic Kidney Disease <60  Kidney Failure <15      CBC & Differential [[id number]]  (Abnormal) Collected: 21    Specimen: Blood Updated: 21    Narrative:      The following orders were created for panel order CBC & Differential.  Procedure                               Abnormality         Status                     ---------                               -----------         ------                     CBC Auto Differential[[id number]]        Abnormal            Final result                 Please view results for these tests on the individual orders.    CBC Auto Differential [[id number]]  (Abnormal) Collected:  "12/18/21 0408    Specimen: Blood Updated: 12/18/21 0432     WBC 3.28 10*3/mm3      RBC 3.37 10*6/mm3      Hemoglobin 9.1 g/dL      Hematocrit 30.0 %      MCV 89.0 fL      MCH 27.0 pg      MCHC 30.3 g/dL      RDW 14.8 %      RDW-SD 47.9 fl      MPV 9.8 fL      Platelets 155 10*3/mm3      Neutrophil % 47.0 %      Lymphocyte % 31.4 %      Monocyte % 18.0 %      Eosinophil % 3.0 %      Basophil % 0.3 %      Immature Grans % 0.3 %      Neutrophils, Absolute 1.54 10*3/mm3      Lymphocytes, Absolute 1.03 10*3/mm3      Monocytes, Absolute 0.59 10*3/mm3      Eosinophils, Absolute 0.10 10*3/mm3      Basophils, Absolute 0.01 10*3/mm3      Immature Grans, Absolute 0.01 10*3/mm3      nRBC 0.0 /100 WBC     Procalcitonin [[id number]]  (Normal) Collected: 12/17/21 1336    Specimen: Blood Updated: 12/18/21 0235     Procalcitonin 0.21 ng/mL     Narrative:      As a Marker for Sepsis (Non-Neonates):     1. <0.5 ng/mL represents a low risk of severe sepsis and/or septic shock.  2. >2 ng/mL represents a high risk of severe sepsis and/or septic shock.    As a Marker for Lower Respiratory Tract Infections that require antibiotic therapy:  PCT on Admission     Antibiotic Therapy             6-12 Hrs later  >0.5                          Strongly Recommended            >0.25 - <0.5             Recommended  0.1 - 0.25                  Discouraged                       Remeasure/reassess PCT  <0.1                         Strongly Discouraged         Remeasure/reassess PCT      As 28 day mortality risk marker: \"Change in Procalcitonin Result\" (>80% or <=80%) if Day 0 (or Day 1) and Day 4 values are available. Refer to http://www.[employer]ss-pct-calculator.com/    Change in PCT <=80 %   A decrease of PCT levels below or equal to 80% defines a positive change in PCT test result representing a higher risk for 28-day all-cause mortality of patients diagnosed with severe sepsis or septic shock.    Change in PCT >80 %   A decrease of PCT levels of more " than 80% defines a negative change in PCT result representing a lower risk for 28-day all-cause mortality of patients diagnosed with severe sepsis or septic shock.                Urine Drug Screen - Urine, Clean Catch [[id number]]  (Abnormal) Collected: 12/17/21 1835    Specimen: Urine, Clean Catch Updated: 12/17/21 2003     THC, Screen, Urine Positive     Phencyclidine (PCP), Urine Negative     Cocaine Screen, Urine Negative     Methamphetamine, Ur Negative     Opiate Screen Negative     Amphetamine Screen, Urine Negative     Benzodiazepine Screen, Urine Negative     Tricyclic Antidepressants Screen Negative     Methadone Screen, Urine Negative     Barbiturates Screen, Urine Negative     Oxycodone Screen, Urine Negative     Propoxyphene Screen Negative     Buprenorphine, Screen, Urine Negative    Narrative:      Cutoff For Drugs Screened:    Amphetamines               500 ng/ml  Barbiturates               200 ng/ml  Benzodiazepines            150 ng/ml  Cocaine                    150 ng/ml  Methadone                  200 ng/ml  Opiates                    100 ng/ml  Phencyclidine               25 ng/ml  THC                            50 ng/ml  Methamphetamine            500 ng/ml  Tricyclic Antidepressants  300 ng/ml  Oxycodone                  100 ng/ml  Propoxyphene               300 ng/ml  Buprenorphine               10 ng/ml    The normal value for all drugs tested is negative. This report includes unconfirmed screening results, with the cutoff values listed, to be used for medical treatment purposes only.  Unconfirmed results must not be used for non-medical purposes such as employment or legal testing.  Clinical consideration should be applied to any drug of abuse test, particularly when unconfirmed results are used.      Urinalysis With Culture If Indicated - Urine, Clean Catch [[id number]]  (Abnormal) Collected: 12/17/21 1835    Specimen: Urine, Clean Catch Updated: 12/17/21 1908     Color, UA Yellow      Appearance, UA Clear     pH, UA 6.0     Specific Gravity, UA 1.011     Glucose, UA Negative     Ketones, UA Negative     Bilirubin, UA Negative     Blood, UA Small (1+)     Protein, UA >=300 mg/dL (3+)     Leuk Esterase, UA Negative     Nitrite, UA Negative     Urobilinogen, UA 0.2 E.U./dL    Urinalysis, Microscopic Only - Urine, Clean Catch [[id number]] Collected: 12/17/21 1835    Specimen: Urine, Clean Catch Updated: 12/17/21 1908     RBC, UA 0-2 /HPF      WBC, UA 0-2 /HPF      Bacteria, UA None Seen /HPF      Squamous Epithelial Cells, UA 0-2 /HPF      Hyaline Casts, UA None Seen /LPF      Methodology Automated Microscopy    Blood Culture - Blood, Arm, Left [[id number]] Collected: 12/17/21 1710    Specimen: Blood from Arm, Left Updated: 12/17/21 1715    Blood Culture - Blood, Arm, Right [[id number]] Collected: 12/17/21 1710    Specimen: Blood from Arm, Right Updated: 12/17/21 1714    Troponin [[id number]]  (Abnormal) Collected: 12/17/21 1536    Specimen: Blood Updated: 12/17/21 1608     Troponin T 0.325 ng/mL     Narrative:      Troponin T Reference Range:  <= 0.03 ng/mL-   Negative for AMI  >0.03 ng/mL-     Abnormal for myocardial necrosis.  Clinicians would have to utilize clinical acumen, EKG, Troponin and serial changes to determine if it is an Acute Myocardial Infarction or myocardial injury due to an underlying chronic condition.       Results may be falsely decreased if patient taking Biotin.      BNP [[id number]]  (Abnormal) Collected: 12/17/21 1336    Specimen: Blood Updated: 12/17/21 1512     proBNP 14,312.0 pg/mL     Narrative:      Among patients with dyspnea, NT-proBNP is highly sensitive for the detection of acute congestive heart failure. In addition NT-proBNP of <300 pg/ml effectively rules out acute congestive heart failure with 99% negative predictive value.    Results may be falsely decreased if patient taking Biotin.      [facility] Draw [[id number]] Collected: 12/17/21 1336    Specimen: Blood Updated:  12/17/21 1445    Narrative:      The following orders were created for panel order [facility] Draw.  Procedure                               Abnormality         Status                     ---------                               -----------         ------                     Green Top (Gel)[[id number]]                                  Final result               Lavender Top[[id number]]                                     Final result               Gold Top - SST[[id number]]                                   Final result               Light Blue Top[[id number]]                                   Final result                 Please view results for these tests on the individual orders.    Green Top (Gel) [[id number]] Collected: 12/17/21 1336    Specimen: Blood Updated: 12/17/21 1445     Extra Tube Hold for add-ons.     Comment: Auto resulted.       Lavender Top [[id number]] Collected: 12/17/21 1336    Specimen: Blood Updated: 12/17/21 1445     Extra Tube hold for add-on     Comment: Auto resulted       Gold Top - SST [[id number]] Collected: 12/17/21 1336    Specimen: Blood Updated: 12/17/21 1445     Extra Tube Hold for add-ons.     Comment: Auto resulted.       Light Blue Top [[id number]] Collected: 12/17/21 1337    Specimen: Blood Updated: 12/17/21 1445     Extra Tube hold for add-on     Comment: Auto resulted       Troponin [[id number]]  (Abnormal) Collected: 12/17/21 1336    Specimen: Blood Updated: 12/17/21 1434     Troponin T 0.372 ng/mL     Narrative:      Troponin T Reference Range:  <= 0.03 ng/mL-   Negative for AMI  >0.03 ng/mL-     Abnormal for myocardial necrosis.  Clinicians would have to utilize clinical acumen, EKG, Troponin and serial changes to determine if it is an Acute Myocardial Infarction or myocardial injury due to an underlying chronic condition.       Results may be falsely decreased if patient taking Biotin.      Ferritin [[id number]]  (Normal) Collected: 12/17/21 1336    Specimen: Blood Updated: 12/17/21  1432     Ferritin 148.40 ng/mL     Narrative:      Results may be falsely decreased if patient taking Biotin.      Lactate Dehydrogenase [[id number]]  (Abnormal) Collected: 12/17/21 1336    Specimen: Blood Updated: 12/17/21 1432      U/L     Comprehensive Metabolic Panel [[id number]]  (Abnormal) Collected: 12/17/21 1336    Specimen: Blood Updated: 12/17/21 1428     Glucose 95 mg/dL      BUN 70 mg/dL      Creatinine 7.55 mg/dL      Sodium 139 mmol/L      Potassium 5.3 mmol/L      Comment: Slight hemolysis detected by analyzer. Results may be affected.        Chloride 109 mmol/L      CO2 16.7 mmol/L      Calcium 7.9 mg/dL      Total Protein 5.5 g/dL      Albumin 3.20 g/dL      ALT (SGPT) 20 U/L      AST (SGOT) 22 U/L      Alkaline Phosphatase 63 U/L      Total Bilirubin 0.2 mg/dL      eGFR Non  Amer --     Comment: <15 Indicative of kidney failure.        eGFR  African Amer 9 mL/min/1.73      Comment: <15 Indicative of kidney failure.        Globulin 2.3 gm/dL      A/G Ratio 1.4 g/dL      BUN/Creatinine Ratio 9.3     Anion Gap 13.3 mmol/L     Narrative:      GFR Normal >60  Chronic Kidney Disease <60  Kidney Failure <15      C-reactive Protein [[id number]]  (Abnormal) Collected: 12/17/21 1336    Specimen: Blood Updated: 12/17/21 1428     C-Reactive Protein 3.16 mg/dL     Magnesium [[id number]]  (Normal) Collected: 12/17/21 1336    Specimen: Blood Updated: 12/17/21 1428     Magnesium 1.8 mg/dL     Protime-INR [[id number]]  (Normal) Collected: 12/17/21 1337    Specimen: Blood Updated: 12/17/21 1355     Protime 12.9 Seconds      INR 0.93    Narrative:      Suggested INR therapeutic range for stable oral anticoagulant therapy:    Low Intensity therapy:   1.5-2.0  Moderate Intensity therapy:   2.0-3.0  High Intensity therapy:   2.5-4.0    CBC & Differential [[id number]]  (Abnormal) Collected: 12/17/21 1336    Specimen: Blood Updated: 12/17/21 1344    Narrative:      The following orders were created for panel order  CBC & Differential.  Procedure                               Abnormality         Status                     ---------                               -----------         ------                     CBC Auto Differential[[id number]]        Abnormal            Final result                 Please view results for these tests on the individual orders.    CBC Auto Differential [[id number]]  (Abnormal) Collected: 12/17/21 1336    Specimen: Blood Updated: 12/17/21 1344     WBC 4.62 10*3/mm3      RBC 3.71 10*6/mm3      Hemoglobin 10.2 g/dL      Hematocrit 33.3 %      MCV 89.8 fL      MCH 27.5 pg      MCHC 30.6 g/dL      RDW 14.9 %      RDW-SD 49.0 fl      MPV 10.0 fL      Platelets 154 10*3/mm3      Neutrophil % 65.2 %      Lymphocyte % 18.0 %      Monocyte % 14.5 %      Eosinophil % 1.7 %      Basophil % 0.4 %      Immature Grans % 0.2 %      Neutrophils, Absolute 3.01 10*3/mm3      Lymphocytes, Absolute 0.83 10*3/mm3      Monocytes, Absolute 0.67 10*3/mm3      Eosinophils, Absolute 0.08 10*3/mm3      Basophils, Absolute 0.02 10*3/mm3      Immature Grans, Absolute 0.01 10*3/mm3      nRBC 0.0 /100 WBC     Blood Gas, Arterial With Co-Ox [[id number]]  (Abnormal) Collected: 12/17/21 1314    Specimen: Arterial Blood Updated: 12/17/21 1315     Site Right Radial     Karsten's Test Positive     pH, Arterial 7.314 pH units      Comment: 84 Value below reference range        pCO2, Arterial 38.8 mm Hg      pO2, Arterial 83.6 mm Hg      HCO3, Arterial 19.7 mmol/L      Comment: 84 Value below reference range        Base Excess, Arterial -6.0 mmol/L      O2 Saturation, Arterial 96.7 %      Hemoglobin, Blood Gas 10.2 g/dL      Comment: 84 Value below reference range        Hematocrit, Blood Gas 31.4 %      Comment: 84 Value below reference range        Oxyhemoglobin 95.2 %      Methemoglobin 0.20 %      Carboxyhemoglobin 1.4 %      A-a Gradiant 16.0 mmHg      CO2 Content 20.9 mmol/L      Temperature 0.0 C      Barometric Pressure for Blood Gas  730 mmHg      Modality Room Air     FIO2 21 %      Ventilator Mode NA     Note --     Collected by [id number]     Comment: Meter: [id number]     :  [id number]        pH, Temp Corrected --     pCO2, Temperature Corrected --     pO2, Temperature Corrected --          Imaging Results (Last 24 Hours)     Procedure Component Value Units Date/Time    CT Abdomen Pelvis Without Contrast [[id number]] Collected: 12/17/21 1553     Updated: 12/17/21 1556    Narrative:      EXAM:    CT Abdomen and Pelvis Without Intravenous Contrast     EXAM DATE:    12/17/2021 3:39 PM     CLINICAL HISTORY:    ANSELMO     TECHNIQUE:    Axial computed tomography images of the abdomen and pelvis without  intravenous contrast.  Sagittal and coronal reformatted images were  created and reviewed.  This CT exam was performed using one or more of  the following dose reduction techniques:  automated exposure control,  adjustment of the mA and/or kV according to patient size, and/or use of  iterative reconstruction technique.     COMPARISON:    No relevant prior studies available.     FINDINGS:    LUNG BASES:  Unremarkable.  No mass.  No consolidation.      ABDOMEN:    LIVER:  Unremarkable.    GALLBLADDER AND BILE DUCTS:  Unremarkable.  No calcified stones.  No  ductal dilation.    PANCREAS:  Unremarkable.  No ductal dilation.    SPLEEN:  Unremarkable.  No splenomegaly.    ADRENALS:  Unremarkable.  No mass.    KIDNEYS AND URETERS:  Unremarkable.  No obstructing stones.  No  hydronephrosis.    STOMACH AND BOWEL:  Unremarkable.  No obstruction.  No mucosal  thickening.      PELVIS:    APPENDIX:  No findings to suggest acute appendicitis.    BLADDER:  Unremarkable.  No stones.    REPRODUCTIVE:  Unremarkable as visualized.      ABDOMEN and PELVIS:    INTRAPERITONEAL SPACE:  Unremarkable.  No free air.  No significant  fluid collection.    BONES/JOINTS:  No acute fracture.  No dislocation.    SOFT TISSUES:  Unremarkable.    VASCULATURE:  Unremarkable.  No  abdominal aortic aneurysm.    LYMPH NODES:  Unremarkable.  No enlarged lymph nodes.       Impression:        No acute findings in the abdomen or pelvis.     This report was finalized on 12/17/2021 3:54 PM by Dr. Stu Simpson MD.       CT Chest Without Contrast Diagnostic [[id number]] Collected: 12/17/21 1553     Updated: 12/17/21 1555    Narrative:      EXAM:    CT Chest Without Intravenous Contrast     EXAM DATE:    12/17/2021 3:39 PM     CLINICAL HISTORY:    SOB     TECHNIQUE:    Axial computed tomography images of the chest without intravenous  contrast.  Sagittal and coronal reformatted images were created and  reviewed.  This CT exam was performed using one or more of the following  dose reduction techniques:  automated exposure control, adjustment of  the mA and/or kV according to patient size, and/or use of iterative  reconstruction technique.     COMPARISON:    12/22/2015     FINDINGS:    LUNGS:  Unremarkable.  No mass.  No consolidation.    PLEURAL SPACE:  Tiny right pleural effusion.  No pneumothorax.    HEART:  Cardiomegaly.  No significant pericardial effusion.    BONES/JOINTS:  Unremarkable.  No acute fracture.  No dislocation.    SOFT TISSUES:  Unremarkable.    VASCULATURE:  Unremarkable.  No thoracic aortic aneurysm.    LYMPH NODES:  Unremarkable.  No enlarged lymph nodes.       Impression:      1.  Tiny right pleural effusion.  2.  Cardiomegaly.     This report was finalized on 12/17/2021 3:53 PM by Dr. Stu Simpson MD.       XR Chest 1 View [[id number]] Collected: 12/17/21 1434     Updated: 12/17/21 1440    Narrative:      EXAM:    XR Chest, 1 View     EXAM DATE:    12/17/2021 1:52 PM     CLINICAL HISTORY:    Chest pain protocol     TECHNIQUE:    Frontal view of the chest.     COMPARISON:    08/02/2020     FINDINGS:    LUNGS:  Unremarkable.  No consolidation.    PLEURAL SPACE:  Minimal blunting of bilateral costophrenic angle  suggestive of tiny pleural effusion.  No pneumothorax.    HEART:   Cardiomegaly.    MEDIASTINUM:  Unremarkable.    BONES/JOINTS:  Unremarkable.    TUBES, LINES AND DEVICES:  Left Port-A-Cath with tip in SVC.       Impression:      1.  Cardiomegaly.  2.  Minimal blunting of bilateral costophrenic angle suggestive of tiny  pleural effusion.     This report was finalized on 12/17/2021 2:38 PM by Dr. Stu Simpson MD.             Vital Signs   Temp:  [96 °F (35.6 °C)-98.5 °F (36.9 °C)] 96.2 °F (35.7 °C)  Heart Rate:  [72-85] 77  Resp:  [16-18] 18  BP: (119-190)/() 136/82    Physical Exam:     General Appearance:    Alert, cooperative, in no acute distress, oriented times three   Head:    Normocephalic, without obvious abnormality   Eyes:            Conjunctivae and sclerae normal, no icterus, no pallor, pupils CCERLA   Ears:    Ears appear intact    Throat:   No oral lesions, no thrush, oral mucosa moist   Neck:   No adenopathy,no thyromegaly, no carotid bruit, no JVD   Back:    no tenderness to percussion, range of motion normal   Lungs:     Clear to auscultation,respirations regular, even and                  unlabored    Heart:    Regular rhythm and normal rate, normal S1 and S2, no            murmur, no gallop, no rub, no click   Chest Wall:    No abnormalities observed   Abdomen:     Normal bowel sounds, no masses, no organomegaly, soft        non-tender, non-distended, no guarding, no rebound                tenderness   Rectal:     Deferred   Extremities:   Moves all extremities well, Plus edema, no cyanosis, no             redness   Pulses:   Pulses palpable and equal bilaterally   Skin:   No petechiae, no nodules, bruising or rash   Lymph nodes:   No palpable adenopathy   Neurologic:   Cranial nerves grossly intact, sensation normal, moves all extremities.     Results Review:   I reviewed the patient's new clinical results.  I personally viewed and interpreted the patient's EKG/Telemetry data      Assessment/Plan     Active Problems:    ANSELMO (acute kidney injury) (HCC)     COVID-19 virus detected    1-Acute kidney injury on chronic kidney disease stage IV: According the patient he was very close to dialysis but the creatinine we got from last year was 2.6 now it is up to 7,  it can be just dehydration but patient has swelling of the leg that time will hold off on fluids and asked patient to drink more I will give 1 dose of albumin today.  Patient is high risk for dialysis    2-Accelerated hypertension: Already started on medication slowly getting better    3-paroxysmal atrial fibrillation    4-congestive heart failure with normal EF    5-COVID-19 positive    Plan of care discussed with pt, answered all questions, patient verbalized understanding and agreed.    S Galen Shultz MD  12/18/21  08:53 EST            Electronically signed by Tom Shultz MD at 12/18/21 0981

## 2021-12-18 NOTE — PLAN OF CARE
Goal Outcome Evaluation:   Patient resting well during shift. He has no complaints or concerns at this time. Blood pressure closely monitored. No distress noted will continue to follow plan of care.

## 2021-12-18 NOTE — CONSULTS
Referring Provider: Dr. Gillis  Reason for Consultation: Acute kidney injury on chronic kidney disease stage IV    Chief complaint shortness of breath    Subjective .     History of present illness:  46 y.o. male with past medical history significant for chronic kidney disease, heart failure, COPD, obstructional sleep apnea, hypertension, paroxysmal atrial fibrillation, nonobstructive coronary artery disease obesity and marijuana usage who presents to the hospital at the recommendation of outside emergency department for further evaluation of acute kidney injury on CKD stage IV.  Patient states that she follows Dr. Cardenas as outpatient and has been told that he may need dialysis sooner than later.  Patient states that he previously presented to [facility] emergency department on 12/12/2021 due to feelings of shortness of breath and persistent cough concern for heart failure exacerbation but was found to have COVID-19 positive status.  On evaluation patient also noted to have significantly elevated creatinine greater than 7 above his baseline however at the time there were no beds for the patient at the hospital and no outside facility was accepting patients at the time patient was discharged from the emergency department home.  Patient presented back to the emergency department again today due to feeling worse however again there were no beds available and patient was recommended to present to our emergency department for evaluation for possible admission.  Patient denies any fevers or chills, nausea, vomiting, or diarrhea.  Patient does admit to shortness of breath.  He denies any decrease or increased urinary output or change in his eating habits and admits that he has been eating drinking as usual and sticking to fluid restriction of 1800 cc daily.  Review of Systems  All systems were reviewed and negative except for above    History  Past Medical History:   Diagnosis Date   • Arthritis    • Asthma    • CHF  (congestive heart failure) (HCC)    • COPD (chronic obstructive pulmonary disease) (HCC)    • Coronary artery disease    • Hypertension    • Sleep apnea    ,   Past Surgical History:   Procedure Laterality Date   • CARDIAC CATHETERIZATION  2008   • HERNIA REPAIR  1982   ,   Family History   Problem Relation Age of Onset   • Hypertension Mother    • Hypertension Father    • Hypertension Sister    • Heart disease Sister    ,   Social History     Tobacco Use   • Smoking status: Former Smoker     Packs/day: 0.25     Types: Cigarettes     Quit date: 2014     Years since quittin.3   • Smokeless tobacco: Never Used   Substance Use Topics   • Alcohol use: Never   • Drug use: Yes     Frequency: 7.0 times per week     Types: Marijuana     Comment: tobacco free    and Allergies:  Patient has no known allergies.    Objective     Lab Results (last 24 hours)     Procedure Component Value Units Date/Time    Comprehensive Metabolic Panel [[id number]]  (Abnormal) Collected: 21 0408    Specimen: Blood Updated: 21     Glucose 114 mg/dL      BUN 73 mg/dL      Creatinine 7.00 mg/dL      Sodium 134 mmol/L      Potassium 5.0 mmol/L      Comment: Specimen hemolyzed.  Results may be affected. 1+ Hemolysis         Chloride 107 mmol/L      CO2 16.4 mmol/L      Calcium 7.8 mg/dL      Total Protein 5.5 g/dL      Albumin 2.55 g/dL      ALT (SGPT) 16 U/L      Comment: Specimen hemolyzed.  Results may be affected.        AST (SGOT) 20 U/L      Alkaline Phosphatase 55 U/L      Total Bilirubin 0.2 mg/dL      eGFR Non  Amer --     Comment: <15 Indicative of kidney failure.        eGFR  African Amer 10 mL/min/1.73      Comment: <15 Indicative of kidney failure.        Globulin 3.0 gm/dL      A/G Ratio 0.9 g/dL      BUN/Creatinine Ratio 10.4     Anion Gap 10.6 mmol/L     Narrative:      GFR Normal >60  Chronic Kidney Disease <60  Kidney Failure <15      CBC & Differential [[id number]]  (Abnormal) Collected: 21  0408    Specimen: Blood Updated: 12/18/21 0432    Narrative:      The following orders were created for panel order CBC & Differential.  Procedure                               Abnormality         Status                     ---------                               -----------         ------                     CBC Auto Differential[[id number]]        Abnormal            Final result                 Please view results for these tests on the individual orders.    CBC Auto Differential [[id number]]  (Abnormal) Collected: 12/18/21 0408    Specimen: Blood Updated: 12/18/21 0432     WBC 3.28 10*3/mm3      RBC 3.37 10*6/mm3      Hemoglobin 9.1 g/dL      Hematocrit 30.0 %      MCV 89.0 fL      MCH 27.0 pg      MCHC 30.3 g/dL      RDW 14.8 %      RDW-SD 47.9 fl      MPV 9.8 fL      Platelets 155 10*3/mm3      Neutrophil % 47.0 %      Lymphocyte % 31.4 %      Monocyte % 18.0 %      Eosinophil % 3.0 %      Basophil % 0.3 %      Immature Grans % 0.3 %      Neutrophils, Absolute 1.54 10*3/mm3      Lymphocytes, Absolute 1.03 10*3/mm3      Monocytes, Absolute 0.59 10*3/mm3      Eosinophils, Absolute 0.10 10*3/mm3      Basophils, Absolute 0.01 10*3/mm3      Immature Grans, Absolute 0.01 10*3/mm3      nRBC 0.0 /100 WBC     Procalcitonin [[id number]]  (Normal) Collected: 12/17/21 1336    Specimen: Blood Updated: 12/18/21 0235     Procalcitonin 0.21 ng/mL     Narrative:      As a Marker for Sepsis (Non-Neonates):     1. <0.5 ng/mL represents a low risk of severe sepsis and/or septic shock.  2. >2 ng/mL represents a high risk of severe sepsis and/or septic shock.    As a Marker for Lower Respiratory Tract Infections that require antibiotic therapy:  PCT on Admission     Antibiotic Therapy             6-12 Hrs later  >0.5                          Strongly Recommended            >0.25 - <0.5             Recommended  0.1 - 0.25                  Discouraged                       Remeasure/reassess PCT  <0.1                         Strongly  "Discouraged         Remeasure/reassess PCT      As 28 day mortality risk marker: \"Change in Procalcitonin Result\" (>80% or <=80%) if Day 0 (or Day 1) and Day 4 values are available. Refer to [email or web address]/    Change in PCT <=80 %   A decrease of PCT levels below or equal to 80% defines a positive change in PCT test result representing a higher risk for 28-day all-cause mortality of patients diagnosed with severe sepsis or septic shock.    Change in PCT >80 %   A decrease of PCT levels of more than 80% defines a negative change in PCT result representing a lower risk for 28-day all-cause mortality of patients diagnosed with severe sepsis or septic shock.                Urine Drug Screen - Urine, Clean Catch [[id number]]  (Abnormal) Collected: 12/17/21 1835    Specimen: Urine, Clean Catch Updated: 12/17/21 2003     THC, Screen, Urine Positive     Phencyclidine (PCP), Urine Negative     Cocaine Screen, Urine Negative     Methamphetamine, Ur Negative     Opiate Screen Negative     Amphetamine Screen, Urine Negative     Benzodiazepine Screen, Urine Negative     Tricyclic Antidepressants Screen Negative     Methadone Screen, Urine Negative     Barbiturates Screen, Urine Negative     Oxycodone Screen, Urine Negative     Propoxyphene Screen Negative     Buprenorphine, Screen, Urine Negative    Narrative:      Cutoff For Drugs Screened:    Amphetamines               500 ng/ml  Barbiturates               200 ng/ml  Benzodiazepines            150 ng/ml  Cocaine                    150 ng/ml  Methadone                  200 ng/ml  Opiates                    100 ng/ml  Phencyclidine               25 ng/ml  THC                            50 ng/ml  Methamphetamine            500 ng/ml  Tricyclic Antidepressants  300 ng/ml  Oxycodone                  100 ng/ml  Propoxyphene               300 ng/ml  Buprenorphine               10 ng/ml    The normal value for all drugs tested is negative. This report includes " unconfirmed screening results, with the cutoff values listed, to be used for medical treatment purposes only.  Unconfirmed results must not be used for non-medical purposes such as employment or legal testing.  Clinical consideration should be applied to any drug of abuse test, particularly when unconfirmed results are used.      Urinalysis With Culture If Indicated - Urine, Clean Catch [[id number]]  (Abnormal) Collected: 12/17/21 1835    Specimen: Urine, Clean Catch Updated: 12/17/21 1908     Color, UA Yellow     Appearance, UA Clear     pH, UA 6.0     Specific Gravity, UA 1.011     Glucose, UA Negative     Ketones, UA Negative     Bilirubin, UA Negative     Blood, UA Small (1+)     Protein, UA >=300 mg/dL (3+)     Leuk Esterase, UA Negative     Nitrite, UA Negative     Urobilinogen, UA 0.2 E.U./dL    Urinalysis, Microscopic Only - Urine, Clean Catch [[id number]] Collected: 12/17/21 1835    Specimen: Urine, Clean Catch Updated: 12/17/21 1908     RBC, UA 0-2 /HPF      WBC, UA 0-2 /HPF      Bacteria, UA None Seen /HPF      Squamous Epithelial Cells, UA 0-2 /HPF      Hyaline Casts, UA None Seen /LPF      Methodology Automated Microscopy    Blood Culture - Blood, Arm, Left [[id number]] Collected: 12/17/21 1710    Specimen: Blood from Arm, Left Updated: 12/17/21 1715    Blood Culture - Blood, Arm, Right [[id number]] Collected: 12/17/21 1710    Specimen: Blood from Arm, Right Updated: 12/17/21 1714    Troponin [[id number]]  (Abnormal) Collected: 12/17/21 1536    Specimen: Blood Updated: 12/17/21 1608     Troponin T 0.325 ng/mL     Narrative:      Troponin T Reference Range:  <= 0.03 ng/mL-   Negative for AMI  >0.03 ng/mL-     Abnormal for myocardial necrosis.  Clinicians would have to utilize clinical acumen, EKG, Troponin and serial changes to determine if it is an Acute Myocardial Infarction or myocardial injury due to an underlying chronic condition.       Results may be falsely decreased if patient taking Biotin.      BNP  [[id number]]  (Abnormal) Collected: 12/17/21 1336    Specimen: Blood Updated: 12/17/21 1512     proBNP 14,312.0 pg/mL     Narrative:      Among patients with dyspnea, NT-proBNP is highly sensitive for the detection of acute congestive heart failure. In addition NT-proBNP of <300 pg/ml effectively rules out acute congestive heart failure with 99% negative predictive value.    Results may be falsely decreased if patient taking Biotin.      [facility] Draw [[id number]] Collected: 12/17/21 1336    Specimen: Blood Updated: 12/17/21 1445    Narrative:      The following orders were created for panel order [facility] Draw.  Procedure                               Abnormality         Status                     ---------                               -----------         ------                     Green Top (Gel)[[id number]]                                  Final result               Lavender Top[[id number]]                                     Final result               Gold Top - SST[[id number]]                                   Final result               Light Blue Top[[id number]]                                   Final result                 Please view results for these tests on the individual orders.    Green Top (Gel) [[id number]] Collected: 12/17/21 1336    Specimen: Blood Updated: 12/17/21 1445     Extra Tube Hold for add-ons.     Comment: Auto resulted.       Lavender Top [[id number]] Collected: 12/17/21 1336    Specimen: Blood Updated: 12/17/21 1445     Extra Tube hold for add-on     Comment: Auto resulted       Gold Top - SST [[id number]] Collected: 12/17/21 1336    Specimen: Blood Updated: 12/17/21 1445     Extra Tube Hold for add-ons.     Comment: Auto resulted.       Light Blue Top [[id number]] Collected: 12/17/21 1337    Specimen: Blood Updated: 12/17/21 1445     Extra Tube hold for add-on     Comment: Auto resulted       Troponin [[id number]]  (Abnormal) Collected: 12/17/21 1336    Specimen: Blood Updated: 12/17/21 8240      Troponin T 0.372 ng/mL     Narrative:      Troponin T Reference Range:  <= 0.03 ng/mL-   Negative for AMI  >0.03 ng/mL-     Abnormal for myocardial necrosis.  Clinicians would have to utilize clinical acumen, EKG, Troponin and serial changes to determine if it is an Acute Myocardial Infarction or myocardial injury due to an underlying chronic condition.       Results may be falsely decreased if patient taking Biotin.      Ferritin [[id number]]  (Normal) Collected: 12/17/21 1336    Specimen: Blood Updated: 12/17/21 1432     Ferritin 148.40 ng/mL     Narrative:      Results may be falsely decreased if patient taking Biotin.      Lactate Dehydrogenase [[id number]]  (Abnormal) Collected: 12/17/21 1336    Specimen: Blood Updated: 12/17/21 1432      U/L     Comprehensive Metabolic Panel [[id number]]  (Abnormal) Collected: 12/17/21 1336    Specimen: Blood Updated: 12/17/21 1428     Glucose 95 mg/dL      BUN 70 mg/dL      Creatinine 7.55 mg/dL      Sodium 139 mmol/L      Potassium 5.3 mmol/L      Comment: Slight hemolysis detected by analyzer. Results may be affected.        Chloride 109 mmol/L      CO2 16.7 mmol/L      Calcium 7.9 mg/dL      Total Protein 5.5 g/dL      Albumin 3.20 g/dL      ALT (SGPT) 20 U/L      AST (SGOT) 22 U/L      Alkaline Phosphatase 63 U/L      Total Bilirubin 0.2 mg/dL      eGFR Non  Amer --     Comment: <15 Indicative of kidney failure.        eGFR  African Amer 9 mL/min/1.73      Comment: <15 Indicative of kidney failure.        Globulin 2.3 gm/dL      A/G Ratio 1.4 g/dL      BUN/Creatinine Ratio 9.3     Anion Gap 13.3 mmol/L     Narrative:      GFR Normal >60  Chronic Kidney Disease <60  Kidney Failure <15      C-reactive Protein [[id number]]  (Abnormal) Collected: 12/17/21 1336    Specimen: Blood Updated: 12/17/21 1428     C-Reactive Protein 3.16 mg/dL     Magnesium [[id number]]  (Normal) Collected: 12/17/21 1336    Specimen: Blood Updated: 12/17/21 1428     Magnesium 1.8 mg/dL      Protime-INR [[id number]]  (Normal) Collected: 12/17/21 1337    Specimen: Blood Updated: 12/17/21 1355     Protime 12.9 Seconds      INR 0.93    Narrative:      Suggested INR therapeutic range for stable oral anticoagulant therapy:    Low Intensity therapy:   1.5-2.0  Moderate Intensity therapy:   2.0-3.0  High Intensity therapy:   2.5-4.0    CBC & Differential [[id number]]  (Abnormal) Collected: 12/17/21 1336    Specimen: Blood Updated: 12/17/21 1344    Narrative:      The following orders were created for panel order CBC & Differential.  Procedure                               Abnormality         Status                     ---------                               -----------         ------                     CBC Auto Differential[[id number]]        Abnormal            Final result                 Please view results for these tests on the individual orders.    CBC Auto Differential [[id number]]  (Abnormal) Collected: 12/17/21 1336    Specimen: Blood Updated: 12/17/21 1344     WBC 4.62 10*3/mm3      RBC 3.71 10*6/mm3      Hemoglobin 10.2 g/dL      Hematocrit 33.3 %      MCV 89.8 fL      MCH 27.5 pg      MCHC 30.6 g/dL      RDW 14.9 %      RDW-SD 49.0 fl      MPV 10.0 fL      Platelets 154 10*3/mm3      Neutrophil % 65.2 %      Lymphocyte % 18.0 %      Monocyte % 14.5 %      Eosinophil % 1.7 %      Basophil % 0.4 %      Immature Grans % 0.2 %      Neutrophils, Absolute 3.01 10*3/mm3      Lymphocytes, Absolute 0.83 10*3/mm3      Monocytes, Absolute 0.67 10*3/mm3      Eosinophils, Absolute 0.08 10*3/mm3      Basophils, Absolute 0.02 10*3/mm3      Immature Grans, Absolute 0.01 10*3/mm3      nRBC 0.0 /100 WBC     Blood Gas, Arterial With Co-Ox [[id number]]  (Abnormal) Collected: 12/17/21 1314    Specimen: Arterial Blood Updated: 12/17/21 1315     Site Right Radial     Karsten's Test Positive     pH, Arterial 7.314 pH units      Comment: 84 Value below reference range        pCO2, Arterial 38.8 mm Hg      pO2, Arterial 83.6 mm  Hg      HCO3, Arterial 19.7 mmol/L      Comment: 84 Value below reference range        Base Excess, Arterial -6.0 mmol/L      O2 Saturation, Arterial 96.7 %      Hemoglobin, Blood Gas 10.2 g/dL      Comment: 84 Value below reference range        Hematocrit, Blood Gas 31.4 %      Comment: 84 Value below reference range        Oxyhemoglobin 95.2 %      Methemoglobin 0.20 %      Carboxyhemoglobin 1.4 %      A-a Gradiant 16.0 mmHg      CO2 Content 20.9 mmol/L      Temperature 0.0 C      Barometric Pressure for Blood Gas 730 mmHg      Modality Room Air     FIO2 21 %      Ventilator Mode NA     Note --     Collected by [id number]     Comment: Meter: [id number]     :  [id number]        pH, Temp Corrected --     pCO2, Temperature Corrected --     pO2, Temperature Corrected --          Imaging Results (Last 24 Hours)     Procedure Component Value Units Date/Time    CT Abdomen Pelvis Without Contrast [[id number]] Collected: 12/17/21 1553     Updated: 12/17/21 1556    Narrative:      EXAM:    CT Abdomen and Pelvis Without Intravenous Contrast     EXAM DATE:    12/17/2021 3:39 PM     CLINICAL HISTORY:    ANSELMO     TECHNIQUE:    Axial computed tomography images of the abdomen and pelvis without  intravenous contrast.  Sagittal and coronal reformatted images were  created and reviewed.  This CT exam was performed using one or more of  the following dose reduction techniques:  automated exposure control,  adjustment of the mA and/or kV according to patient size, and/or use of  iterative reconstruction technique.     COMPARISON:    No relevant prior studies available.     FINDINGS:    LUNG BASES:  Unremarkable.  No mass.  No consolidation.      ABDOMEN:    LIVER:  Unremarkable.    GALLBLADDER AND BILE DUCTS:  Unremarkable.  No calcified stones.  No  ductal dilation.    PANCREAS:  Unremarkable.  No ductal dilation.    SPLEEN:  Unremarkable.  No splenomegaly.    ADRENALS:  Unremarkable.  No mass.    KIDNEYS AND URETERS:   Unremarkable.  No obstructing stones.  No  hydronephrosis.    STOMACH AND BOWEL:  Unremarkable.  No obstruction.  No mucosal  thickening.      PELVIS:    APPENDIX:  No findings to suggest acute appendicitis.    BLADDER:  Unremarkable.  No stones.    REPRODUCTIVE:  Unremarkable as visualized.      ABDOMEN and PELVIS:    INTRAPERITONEAL SPACE:  Unremarkable.  No free air.  No significant  fluid collection.    BONES/JOINTS:  No acute fracture.  No dislocation.    SOFT TISSUES:  Unremarkable.    VASCULATURE:  Unremarkable.  No abdominal aortic aneurysm.    LYMPH NODES:  Unremarkable.  No enlarged lymph nodes.       Impression:        No acute findings in the abdomen or pelvis.     This report was finalized on 12/17/2021 3:54 PM by Dr. Stu Simpson MD.       CT Chest Without Contrast Diagnostic [[id number]] Collected: 12/17/21 1553     Updated: 12/17/21 1555    Narrative:      EXAM:    CT Chest Without Intravenous Contrast     EXAM DATE:    12/17/2021 3:39 PM     CLINICAL HISTORY:    SOB     TECHNIQUE:    Axial computed tomography images of the chest without intravenous  contrast.  Sagittal and coronal reformatted images were created and  reviewed.  This CT exam was performed using one or more of the following  dose reduction techniques:  automated exposure control, adjustment of  the mA and/or kV according to patient size, and/or use of iterative  reconstruction technique.     COMPARISON:    12/22/2015     FINDINGS:    LUNGS:  Unremarkable.  No mass.  No consolidation.    PLEURAL SPACE:  Tiny right pleural effusion.  No pneumothorax.    HEART:  Cardiomegaly.  No significant pericardial effusion.    BONES/JOINTS:  Unremarkable.  No acute fracture.  No dislocation.    SOFT TISSUES:  Unremarkable.    VASCULATURE:  Unremarkable.  No thoracic aortic aneurysm.    LYMPH NODES:  Unremarkable.  No enlarged lymph nodes.       Impression:      1.  Tiny right pleural effusion.  2.  Cardiomegaly.     This report was finalized on  12/17/2021 3:53 PM by Dr. Stu Simpson MD.       XR Chest 1 View [[id number]] Collected: 12/17/21 1434     Updated: 12/17/21 1440    Narrative:      EXAM:    XR Chest, 1 View     EXAM DATE:    12/17/2021 1:52 PM     CLINICAL HISTORY:    Chest pain protocol     TECHNIQUE:    Frontal view of the chest.     COMPARISON:    08/02/2020     FINDINGS:    LUNGS:  Unremarkable.  No consolidation.    PLEURAL SPACE:  Minimal blunting of bilateral costophrenic angle  suggestive of tiny pleural effusion.  No pneumothorax.    HEART:  Cardiomegaly.    MEDIASTINUM:  Unremarkable.    BONES/JOINTS:  Unremarkable.    TUBES, LINES AND DEVICES:  Left Port-A-Cath with tip in SVC.       Impression:      1.  Cardiomegaly.  2.  Minimal blunting of bilateral costophrenic angle suggestive of tiny  pleural effusion.     This report was finalized on 12/17/2021 2:38 PM by Dr. Stu Simpson MD.             Vital Signs   Temp:  [96 °F (35.6 °C)-98.5 °F (36.9 °C)] 96.2 °F (35.7 °C)  Heart Rate:  [72-85] 77  Resp:  [16-18] 18  BP: (119-190)/() 136/82    Physical Exam:     General Appearance:    Alert, cooperative, in no acute distress, oriented times three   Head:    Normocephalic, without obvious abnormality   Eyes:            Conjunctivae and sclerae normal, no icterus, no pallor, pupils CCERLA   Ears:    Ears appear intact    Throat:   No oral lesions, no thrush, oral mucosa moist   Neck:   No adenopathy,no thyromegaly, no carotid bruit, no JVD   Back:    no tenderness to percussion, range of motion normal   Lungs:     Clear to auscultation,respirations regular, even and                  unlabored    Heart:    Regular rhythm and normal rate, normal S1 and S2, no            murmur, no gallop, no rub, no click   Chest Wall:    No abnormalities observed   Abdomen:     Normal bowel sounds, no masses, no organomegaly, soft        non-tender, non-distended, no guarding, no rebound                tenderness   Rectal:     Deferred   Extremities:    Moves all extremities well, Plus edema, no cyanosis, no             redness   Pulses:   Pulses palpable and equal bilaterally   Skin:   No petechiae, no nodules, bruising or rash   Lymph nodes:   No palpable adenopathy   Neurologic:   Cranial nerves grossly intact, sensation normal, moves all extremities.     Results Review:   I reviewed the patient's new clinical results.  I personally viewed and interpreted the patient's EKG/Telemetry data      Assessment/Plan     Active Problems:    ANSELMO (acute kidney injury) (HCC)    COVID-19 virus detected    1-Acute kidney injury on chronic kidney disease stage IV: According the patient he was very close to dialysis but the creatinine we got from last year was 2.6 now it is up to 7,  it can be just dehydration but patient has swelling of the leg that time will hold off on fluids and asked patient to drink more I will give 1 dose of albumin today.  Patient is high risk for dialysis    2-Accelerated hypertension: Already started on medication slowly getting better    3-paroxysmal atrial fibrillation    4-congestive heart failure with normal EF    5-COVID-19 positive    Plan of care discussed with pt, answered all questions, patient verbalized understanding and agreed.    MATHEW Shultz MD  12/18/21  08:53 EST

## 2021-12-18 NOTE — PAYOR COMM NOTE
"[facility]  GINETTE MCNEILL  PHONE  [phone]  FAX  [phone]  NPI:  [id number]    ADMISSION NOTIFICATION    Elisha Keene (46 y.o. Male)             Date of Birth Social Security Number Address Home Phone MRN    1975  [address] [address] [phone] [id number]    [employer] Marital Status             None Single       Admission Date Admission Type Admitting Provider Attending Provider Department, Room/Bed    12/17/21 Emergency Jaun Quiles DO Cagle, William, DO [facility] 3 [facility], 3321/1P    Discharge Date Discharge Disposition Discharge Destination                         Attending Provider: Jaun Qulies DO    Allergies: No Known Allergies    Isolation: None   Infection: None   Code Status: CPR   Advance Care Planning Activity    Ht: 176.5 cm (69.5\")   Wt: 144 kg (318 lb 6.4 oz)    Admission Cmt: None   Principal Problem: COVID-19 virus detected [U07.1]                 Active Insurance as of 12/17/2021     Primary Coverage     Payor Plan Insurance Group Employer/Plan Group    ANTHEM MEDICARE REPLACEMENT ANTHEM MEDICARE ADVANTAGE [id number]     Payor Plan Address Payor Plan Phone Number Payor Plan Fax Number Effective Dates    [address] [phone]  1/1/2020 - None Entered    [facility] [address]       Subscriber Name Subscriber Birth Date Member ID       ELISHA KEENE 1975 [id number]           Secondary Coverage     Payor Plan Insurance Group Employer/Plan Group    [facility] MEDICAID      Payor Plan Address Payor Plan Phone Number Payor Plan Fax Number Effective Dates    [address] [address] [phone]  8/1/2020 - None Entered    [facility] [id number]       Subscriber Name Subscriber Birth Date Member ID       ELISHA KEENE 1975 [id number]                 Emergency Contacts      (Rel.) Home Phone Work Phone Mobile Phone    HUAN LUI (Other) [phone] -- --    holland caruso (Mother) -- -- [phone]    "

## 2021-12-18 NOTE — PLAN OF CARE
Goal Outcome Evaluation:         Pt has rested in bed today with no complaints. Received albumin. Had good appetite.US renal and echo completed. Has been on room air all day. Will continue to monitor and follow plan of care.

## 2021-12-19 LAB
ALBUMIN SERPL-MCNC: 2.73 G/DL (ref 3.5–5.2)
ALBUMIN/GLOB SERPL: 1 G/DL
ALP SERPL-CCNC: 52 U/L (ref 39–117)
ALT SERPL W P-5'-P-CCNC: 13 U/L (ref 1–41)
ANION GAP SERPL CALCULATED.3IONS-SCNC: 12 MMOL/L (ref 5–15)
AST SERPL-CCNC: 13 U/L (ref 1–40)
BASOPHILS # BLD AUTO: 0.01 10*3/MM3 (ref 0–0.2)
BASOPHILS NFR BLD AUTO: 0.3 % (ref 0–1.5)
BILIRUB SERPL-MCNC: 0.2 MG/DL (ref 0–1.2)
BUN SERPL-MCNC: 73 MG/DL (ref 6–20)
BUN/CREAT SERPL: 9.9 (ref 7–25)
CALCIUM SPEC-SCNC: 7.8 MG/DL (ref 8.6–10.5)
CHLORIDE SERPL-SCNC: 106 MMOL/L (ref 98–107)
CHLORIDE UR-SCNC: 32 MMOL/L
CO2 SERPL-SCNC: 18 MMOL/L (ref 22–29)
CREAT SERPL-MCNC: 7.37 MG/DL (ref 0.76–1.27)
CREAT UR-MCNC: 64.1 MG/DL
DEPRECATED RDW RBC AUTO: 45.6 FL (ref 37–54)
EOSINOPHIL # BLD AUTO: 0.12 10*3/MM3 (ref 0–0.4)
EOSINOPHIL NFR BLD AUTO: 3.6 % (ref 0.3–6.2)
ERYTHROCYTE [DISTWIDTH] IN BLOOD BY AUTOMATED COUNT: 14.4 % (ref 12.3–15.4)
GFR SERPL CREATININE-BSD FRML MDRD: 10 ML/MIN/1.73
GFR SERPL CREATININE-BSD FRML MDRD: ABNORMAL ML/MIN/{1.73_M2}
GLOBULIN UR ELPH-MCNC: 2.7 GM/DL
GLUCOSE SERPL-MCNC: 101 MG/DL (ref 65–99)
HCT VFR BLD AUTO: 28.6 % (ref 37.5–51)
HGB BLD-MCNC: 8.9 G/DL (ref 13–17.7)
IMM GRANULOCYTES # BLD AUTO: 0 10*3/MM3 (ref 0–0.05)
IMM GRANULOCYTES NFR BLD AUTO: 0 % (ref 0–0.5)
LYMPHOCYTES # BLD AUTO: 1.11 10*3/MM3 (ref 0.7–3.1)
LYMPHOCYTES NFR BLD AUTO: 33.5 % (ref 19.6–45.3)
MAGNESIUM SERPL-MCNC: 1.8 MG/DL (ref 1.6–2.6)
MCH RBC QN AUTO: 27.4 PG (ref 26.6–33)
MCHC RBC AUTO-ENTMCNC: 31.1 G/DL (ref 31.5–35.7)
MCV RBC AUTO: 88 FL (ref 79–97)
MONOCYTES # BLD AUTO: 0.52 10*3/MM3 (ref 0.1–0.9)
MONOCYTES NFR BLD AUTO: 15.7 % (ref 5–12)
NEUTROPHILS NFR BLD AUTO: 1.55 10*3/MM3 (ref 1.7–7)
NEUTROPHILS NFR BLD AUTO: 46.9 % (ref 42.7–76)
NRBC BLD AUTO-RTO: 0 /100 WBC (ref 0–0.2)
PLATELET # BLD AUTO: 155 10*3/MM3 (ref 140–450)
PMV BLD AUTO: 10 FL (ref 6–12)
POTASSIUM SERPL-SCNC: 4.6 MMOL/L (ref 3.5–5.2)
POTASSIUM UR-SCNC: 21.6 MMOL/L
PROT ?TM UR-MCNC: 196 MG/DL
PROT SERPL-MCNC: 5.4 G/DL (ref 6–8.5)
PROT/CREAT UR: 3057.7 MG/G CREA (ref 0–200)
RBC # BLD AUTO: 3.25 10*6/MM3 (ref 4.14–5.8)
SODIUM SERPL-SCNC: 136 MMOL/L (ref 136–145)
SODIUM UR-SCNC: 43 MMOL/L
WBC NRBC COR # BLD: 3.31 10*3/MM3 (ref 3.4–10.8)

## 2021-12-19 PROCEDURE — 94799 UNLISTED PULMONARY SVC/PX: CPT

## 2021-12-19 PROCEDURE — 84300 ASSAY OF URINE SODIUM: CPT | Performed by: INTERNAL MEDICINE

## 2021-12-19 PROCEDURE — 80053 COMPREHEN METABOLIC PANEL: CPT | Performed by: INTERNAL MEDICINE

## 2021-12-19 PROCEDURE — 82436 ASSAY OF URINE CHLORIDE: CPT | Performed by: INTERNAL MEDICINE

## 2021-12-19 PROCEDURE — 84133 ASSAY OF URINE POTASSIUM: CPT | Performed by: INTERNAL MEDICINE

## 2021-12-19 PROCEDURE — P9047 ALBUMIN (HUMAN), 25%, 50ML: HCPCS | Performed by: INTERNAL MEDICINE

## 2021-12-19 PROCEDURE — 25010000002 ALBUMIN HUMAN 25% PER 50 ML: Performed by: INTERNAL MEDICINE

## 2021-12-19 PROCEDURE — 94660 CPAP INITIATION&MGMT: CPT

## 2021-12-19 PROCEDURE — 82570 ASSAY OF URINE CREATININE: CPT | Performed by: INTERNAL MEDICINE

## 2021-12-19 PROCEDURE — 99232 SBSQ HOSP IP/OBS MODERATE 35: CPT | Performed by: STUDENT IN AN ORGANIZED HEALTH CARE EDUCATION/TRAINING PROGRAM

## 2021-12-19 PROCEDURE — 85025 COMPLETE CBC W/AUTO DIFF WBC: CPT | Performed by: INTERNAL MEDICINE

## 2021-12-19 PROCEDURE — 84156 ASSAY OF PROTEIN URINE: CPT | Performed by: INTERNAL MEDICINE

## 2021-12-19 PROCEDURE — 83735 ASSAY OF MAGNESIUM: CPT | Performed by: INTERNAL MEDICINE

## 2021-12-19 RX ORDER — ALBUMIN (HUMAN) 12.5 G/50ML
12.5 SOLUTION INTRAVENOUS ONCE
Status: COMPLETED | OUTPATIENT
Start: 2021-12-19 | End: 2021-12-19

## 2021-12-19 RX ADMIN — AMLODIPINE BESYLATE 10 MG: 10 TABLET ORAL at 08:14

## 2021-12-19 RX ADMIN — FERROUS SULFATE TAB 325 MG (65 MG ELEMENTAL FE) 325 MG: 325 (65 FE) TAB at 08:14

## 2021-12-19 RX ADMIN — LABETALOL HYDROCHLORIDE 300 MG: 200 TABLET, FILM COATED ORAL at 21:20

## 2021-12-19 RX ADMIN — ALPRAZOLAM 1 MG: 1 TABLET ORAL at 21:20

## 2021-12-19 RX ADMIN — BUPROPION HYDROCHLORIDE 300 MG: 150 TABLET, FILM COATED, EXTENDED RELEASE ORAL at 05:22

## 2021-12-19 RX ADMIN — TAMSULOSIN HYDROCHLORIDE 0.4 MG: 0.4 CAPSULE ORAL at 08:14

## 2021-12-19 RX ADMIN — MONTELUKAST SODIUM 10 MG: 10 TABLET, COATED ORAL at 21:20

## 2021-12-19 RX ADMIN — ISOSORBIDE MONONITRATE 120 MG: 60 TABLET ORAL at 08:14

## 2021-12-19 RX ADMIN — CLONIDINE HYDROCHLORIDE 0.3 MG: 0.3 TABLET ORAL at 21:20

## 2021-12-19 RX ADMIN — MINOXIDIL 10 MG: 10 TABLET ORAL at 21:20

## 2021-12-19 RX ADMIN — CLONIDINE HYDROCHLORIDE 0.3 MG: 0.3 TABLET ORAL at 15:35

## 2021-12-19 RX ADMIN — ALPRAZOLAM 1 MG: 1 TABLET ORAL at 08:14

## 2021-12-19 RX ADMIN — LABETALOL HYDROCHLORIDE 300 MG: 200 TABLET, FILM COATED ORAL at 08:14

## 2021-12-19 RX ADMIN — SODIUM CHLORIDE, PRESERVATIVE FREE 10 ML: 5 INJECTION INTRAVENOUS at 08:14

## 2021-12-19 RX ADMIN — ALBUMIN HUMAN 12.5 G: 0.25 SOLUTION INTRAVENOUS at 11:35

## 2021-12-19 RX ADMIN — LABETALOL HYDROCHLORIDE 300 MG: 200 TABLET, FILM COATED ORAL at 15:35

## 2021-12-19 RX ADMIN — ATORVASTATIN CALCIUM 40 MG: 40 TABLET, FILM COATED ORAL at 21:20

## 2021-12-19 RX ADMIN — APIXABAN 5 MG: 5 TABLET, FILM COATED ORAL at 21:20

## 2021-12-19 RX ADMIN — CLONIDINE HYDROCHLORIDE 0.3 MG: 0.3 TABLET ORAL at 08:14

## 2021-12-19 RX ADMIN — APIXABAN 5 MG: 5 TABLET, FILM COATED ORAL at 08:14

## 2021-12-19 NOTE — PLAN OF CARE
Goal Outcome Evaluation:  Plan of Care Reviewed With: patient        Progress: improving  Outcome Summary: Pt resting in bed. Pt voices no concerns or complaints at this time. No s/s of acute distress. Will follow care of plan.

## 2021-12-19 NOTE — PHARMACY PATIENT ASSISTANCE
Pharmacy evaluated patient co-pay for home medication, Eliquis. According to the patient, the patient reports no issues with cost of the medication and obtaining it. Patient pharmacy close through the weekend. No issues identified at this time.     Thank you,    Sandy Perez. Johnathan, PharmD  12/19/21  08:04 EST

## 2021-12-19 NOTE — PROGRESS NOTES
Nephrology Progress Note      Subjective     Patient eating and drinking well no cough    Objective       Vital signs :     Temp:  [96.4 °F (35.8 °C)-98.7 °F (37.1 °C)] 97.9 °F (36.6 °C)  Heart Rate:  [] 74  Resp:  [18-21] 21  BP: (114-152)/(59-98) 152/78    I/O last 3 completed shifts:  In: 1080 [P.O.:1080]  Out: 1150 [Urine:1150]  No intake/output data recorded.    Physical Exam:    General Appearance : alert, pleasant, appears stated age, cooperative   Head  :  normocephalic, without obvious abnormality and atraumatic  Eyes  :  conjunctivae and sclerae normal, no icterus, no pallor and PERRLA  Neck  :  no adenopathy, suppple, no carotid bruit, no JVD   Lungs : clear to auscultation, respirations regular  Heart :  regular rhythm & normal rate, normal S1, S2 and no murmur, no rub  Abdomen : normal bowel sounds, no masses, no hepatomegaly, no splenomegaly, soft non-tender and no guarding  Extremities : moves extremities well, no edema, no cyanosis and no redness  Skin :  no bleeding, bruising or rash  Neurologic :   orientated to person, place, time and situation, Grossly no focal deficits  Acess :       Laboratory Data :       WBC WBC   Date Value Ref Range Status   12/19/2021 3.31 (L) 3.40 - 10.80 10*3/mm3 Final   12/18/2021 3.28 (L) 3.40 - 10.80 10*3/mm3 Final   12/17/2021 4.62 3.40 - 10.80 10*3/mm3 Final      HGB Hemoglobin   Date Value Ref Range Status   12/19/2021 8.9 (L) 13.0 - 17.7 g/dL Final   12/18/2021 9.1 (L) 13.0 - 17.7 g/dL Final   12/17/2021 10.2 (L) 13.0 - 17.7 g/dL Final      HCT Hematocrit   Date Value Ref Range Status   12/19/2021 28.6 (L) 37.5 - 51.0 % Final   12/18/2021 30.0 (L) 37.5 - 51.0 % Final   12/17/2021 33.3 (L) 37.5 - 51.0 % Final      Platlets No results found for: LABPLAT   MCV MCV   Date Value Ref Range Status   12/19/2021 88.0 79.0 - 97.0 fL Final   12/18/2021 89.0 79.0 - 97.0 fL Final   12/17/2021 89.8 79.0 - 97.0 fL Final          Sodium Sodium   Date Value Ref Range Status    12/19/2021 136 136 - 145 mmol/L Final   12/18/2021 134 (L) 136 - 145 mmol/L Final   12/17/2021 139 136 - 145 mmol/L Final      Potassium Potassium   Date Value Ref Range Status   12/19/2021 4.6 3.5 - 5.2 mmol/L Final   12/18/2021 5.0 3.5 - 5.2 mmol/L Final     Comment:     Specimen hemolyzed.  Results may be affected. 1+ Hemolysis    12/17/2021 5.3 (H) 3.5 - 5.2 mmol/L Final     Comment:     Slight hemolysis detected by analyzer. Results may be affected.      Chloride Chloride   Date Value Ref Range Status   12/19/2021 106 98 - 107 mmol/L Final   12/18/2021 107 98 - 107 mmol/L Final   12/17/2021 109 (H) 98 - 107 mmol/L Final      CO2 CO2   Date Value Ref Range Status   12/19/2021 18.0 (L) 22.0 - 29.0 mmol/L Final   12/18/2021 16.4 (L) 22.0 - 29.0 mmol/L Final   12/17/2021 16.7 (L) 22.0 - 29.0 mmol/L Final      BUN BUN   Date Value Ref Range Status   12/19/2021 73 (H) 6 - 20 mg/dL Final   12/18/2021 73 (H) 6 - 20 mg/dL Final   12/17/2021 70 (H) 6 - 20 mg/dL Final      Creatinine Creatinine   Date Value Ref Range Status   12/19/2021 7.37 (H) 0.76 - 1.27 mg/dL Final   12/18/2021 7.00 (H) 0.76 - 1.27 mg/dL Final   12/17/2021 7.55 (H) 0.76 - 1.27 mg/dL Final      Calcium Calcium   Date Value Ref Range Status   12/19/2021 7.8 (L) 8.6 - 10.5 mg/dL Final   12/18/2021 7.8 (L) 8.6 - 10.5 mg/dL Final   12/17/2021 7.9 (L) 8.6 - 10.5 mg/dL Final      PO4 No results found for: CAPO4   Albumin Albumin   Date Value Ref Range Status   12/19/2021 2.73 (L) 3.50 - 5.20 g/dL Final   12/18/2021 2.55 (L) 3.50 - 5.20 g/dL Final   12/17/2021 3.20 (L) 3.50 - 5.20 g/dL Final      Magnesium Magnesium   Date Value Ref Range Status   12/19/2021 1.8 1.6 - 2.6 mg/dL Final   12/17/2021 1.8 1.6 - 2.6 mg/dL Final          PTH               No results found for: PTH      Medications :     ALPRAZolam, 1 mg, Oral, BID  amLODIPine, 10 mg, Oral, Daily  apixaban, 5 mg, Oral, BID  atorvastatin, 40 mg, Oral, Nightly  buPROPion XL, 300 mg, Oral,  QAM  cloNIDine, 0.3 mg, Oral, TID  ferrous sulfate, 325 mg, Oral, Daily With Breakfast  isosorbide mononitrate, 120 mg, Oral, Daily  labetalol, 300 mg, Oral, TID  minoxidil, 10 mg, Oral, Nightly  montelukast, 10 mg, Oral, Nightly  sodium chloride, 10 mL, Intravenous, Q12H  tamsulosin, 0.4 mg, Oral, Daily             Assessment/Plan     1-Acute kidney injury on chronic kidney disease stage IV:  Patient's creatinine 7.3 stable is making urine no shortness of breath no other signs of uremia at this time so I do not see any acute need of dialysis today , according the patient he was very close to dialysis but the creatinine we got from last year was 2.6 now it is up to 8.0 and now at 7.3,  it can be just dehydration but patient has swelling of the leg that time will hold off on fluids and asked patient to drink more I will give 1 dose of albumin today again.  Patient is high risk for dialysis     2-Accelerated hypertension: Already started on medication slowly getting better     3-paroxysmal atrial fibrillation     4-congestive heart failure with normal EF     5-COVID-19 positive    6. Anemia we will check iron studies     Plan of care discussed with pt, answered all questions, patient verbalized understanding and agreed.    MATHEW Shultz MD  12/19/21  09:21 EST

## 2021-12-19 NOTE — PLAN OF CARE
Goal Outcome Evaluation:  Plan of Care Reviewed With: patient           Outcome Summary: Pt resting in bed with no complaints noted. VSS. No s/s of acute distress noted. Will cont to follow POC.

## 2021-12-19 NOTE — PROGRESS NOTES
[facility] HOSPITALIST PROGRESS NOTE     Patient Identification:  Name:  Sudarshan Keene  Age:  46 y.o.  Sex:  male  :  1975  MRN:  [id number]  Visit Number:  [id number]  ROOM: [address] [place] [address]     Primary Care Provider:  Dedra Azar MD    Length of stay in inpatient status:  1    Subjective     Chief Compliant:    Chief Complaint   Patient presents with   • Exposure To Known Illness   • Shortness of Breath       History of Presenting Illness: Patient seen in follow-up for COVID-19 positive status in the setting of acute kidney injury on CKD stage IIIb with accelerated hypertension.  Patient today feeling well and seen by nephrology.    Objective     Current Hospital Meds:ALPRAZolam, 1 mg, Oral, BID  amLODIPine, 10 mg, Oral, Daily  apixaban, 5 mg, Oral, BID  atorvastatin, 40 mg, Oral, Nightly  buPROPion XL, 300 mg, Oral, QAM  cloNIDine, 0.3 mg, Oral, TID  ferrous sulfate, 325 mg, Oral, Daily With Breakfast  isosorbide mononitrate, 120 mg, Oral, Daily  labetalol, 300 mg, Oral, TID  minoxidil, 10 mg, Oral, Nightly  montelukast, 10 mg, Oral, Nightly  sodium chloride, 10 mL, Intravenous, Q12H  tamsulosin, 0.4 mg, Oral, Daily         Current Antimicrobial Therapy:  Anti-Infectives (From admission, onward)    None        Current Diuretic Therapy:  Diuretics (From admission, onward)    None        ----------------------------------------------------------------------------------------------------------------------  Vital Signs:  Temp:  [96 °F (35.6 °C)-98.7 °F (37.1 °C)] 97 °F (36.1 °C)  Heart Rate:  [] 80  Resp:  [18-20] 20  BP: (114-152)/() 140/90  SpO2:  [96 %-99 %] 97 %  on   ;   Device (Oxygen Therapy): room air  Body mass index is 46.35 kg/m².    Wt Readings from Last 3 Encounters:   21 (!) 144 kg (318 lb 6.4 oz)   20 (!) 166 kg (366 lb 11.2 oz)     Intake & Output (last 3 days)        0701   0700  0701   0700  0701   0700    P.O.  360 360     Total Intake(mL/kg)  360 (2.5) 360 (2.5)    Net  +360 +360               Diet Regular; Renal, Cardiac  ----------------------------------------------------------------------------------------------------------------------  Physical exam:  This physical exam has been personally performed remotely in the unit aided by real-time audio/visual communication tools.  Nursing present at bedside during this exam and assisted during exam. The use of a video visit has been reviewed with the patient and verbal informed consent has been obtained.     Physical Exam:  General: Patient appears awake, alert, and in no acute distress.  Head: Normocephalic, atraumatic  Eyes: EOMI. Conjunctivae and sclerae normal.  Ears: Ears appear intact with no abnormalities noted.   Neck: Trachea midline. No obvious JVD.  Heart: Tele reveals sinus rhythm  Lungs: Respirations appear to be regular, even and unlabored with no signs of respiratory distress. No audible wheezing.  Abdomen: No obvious abdominal distension.  MS: Muscle tone appears normal. No gross deformities.  Extremities: No clubbing, cyanosis or edema noted.  Skin: No visible bleeding, bruising, or rash.  Neurologic: Alert and oriented x3. No gross focal deficits.   ----------------------------------------------------------------------------------------------------------------------  Tele:    ----------------------------------------------------------------------------------------------------------------------  Results from last 7 days   Lab Units 12/18/21  0408 12/17/21  1337 12/17/21  1336   CRP mg/dL  --   --  3.16*   WBC 10*3/mm3 3.28*  --  4.62   HEMOGLOBIN g/dL 9.1*  --  10.2*   HEMATOCRIT % 30.0*  --  33.3*   MCV fL 89.0  --  89.8   MCHC g/dL 30.3*  --  30.6*   PLATELETS 10*3/mm3 155  --  154   INR   --  0.93  --      Results from last 7 days   Lab Units 12/17/21  1314   PH, ARTERIAL pH units 7.314*   PO2 ART mm Hg 83.6   PCO2, ARTERIAL mm Hg 38.8   HCO3 ART mmol/L 19.7*      Results from last 7 days   Lab Units 12/18/21  0408 12/17/21  1336   SODIUM mmol/L 134* 139   POTASSIUM mmol/L 5.0 5.3*   MAGNESIUM mg/dL  --  1.8   CHLORIDE mmol/L 107 109*   CO2 mmol/L 16.4* 16.7*   BUN mg/dL 73* 70*   CREATININE mg/dL 7.00* 7.55*   EGFR IF AFRICN AM mL/min/1.73 10* 9*   CALCIUM mg/dL 7.8* 7.9*   GLUCOSE mg/dL 114* 95   ALBUMIN g/dL 2.55* 3.20*   BILIRUBIN mg/dL 0.2 0.2   ALK PHOS U/L 55 63   AST (SGOT) U/L 20 22   ALT (SGPT) U/L 16 20   Estimated Creatinine Clearance: 18.8 mL/min (A) (by C-G formula based on SCr of 7 mg/dL (H)).  No results found for: AMMONIA  Results from last 7 days   Lab Units 12/17/21  1536 12/17/21  1336   TROPONIN T ng/mL 0.325* 0.372*     Results from last 7 days   Lab Units 12/17/21  1336   PROBNP pg/mL 14,312.0*         No results found for: HGBA1C, POCGLU  Lab Results   Component Value Date    TSH 1.670 08/01/2020    FREET4 1.05 12/21/2015     No results found for: PREGTESTUR, PREGSERUM, HCG, HCGQUANT  Pain Management Panel     Pain Management Panel Latest Ref Rng & Units 12/17/2021 8/3/2020    CREATININE UR mg/dL - 90.2    AMPHETAMINES SCREEN, URINE Negative Negative -    BARBITURATES SCREEN Negative Negative -    BENZODIAZEPINE SCREEN, URINE Negative Negative -    BUPRENORPHINEUR Negative Negative -    COCAINE SCREEN, URINE Negative Negative -    METHADONE SCREEN, URINE Negative Negative -    METHAMPHETAMINEUR Negative Negative -        Brief Urine Lab Results  (Last result in the past 365 days)      Color   Clarity   Blood   Leuk Est   Nitrite   Protein   CREAT   Urine HCG        12/17/21 1835 Yellow   Clear   Small (1+)   Negative   Negative   >=300 mg/dL (3+)               Blood Culture   Date Value Ref Range Status   12/17/2021 No growth at 24 hours  Preliminary   12/17/2021 No growth at 24 hours  Preliminary     No results found for: URINECX  No results found for: WOUNDCX  No results found for: STOOLCX  No results found for: RESPCX  No results found for:  AFBCX  Results from last 7 days   Lab Units 12/17/21  1336   PROCALCITONIN ng/mL 0.21   CRP mg/dL 3.16*       I have personally looked at the labs and they are summarized above.  ----------------------------------------------------------------------------------------------------------------------  Detailed radiology reports for the last 24 hours:    Imaging Results (Last 24 Hours)     Procedure Component Value Units Date/Time    US Renal Bilateral [[id number]] Collected: 12/18/21 1911     Updated: 12/18/21 1913    Narrative:      US Renal Comp    HISTORY:  Acute renal failure. COVID-19 positive.    COMPARISON:    None available.    TECHNIQUE:  Grayscale ultrasound imaging of both kidneys and the urinary bladder was performed.    FINDINGS:  Both kidneys are normal in size. Diffusely echogenic renal parenchyma throughout both kidneys. No evidence of urinary obstruction. No visible nephrolithiasis, renal mass, perinephric fluid collection or renal parenchymal scarring. Renal length measures  9.9 cm on the right and 2.3 cm on the left.      Impression:      Increased bilateral renal echogenicity, consistent with medical renal disease. No hydronephrosis.    Signer Name: Isidoro Aldrich MD   Signed: 12/18/2021 7:11 PM   Workstation Name: [email or web address]    [facility]        Assessment & Plan      Acute kidney injury on CKD 3B  Accelerated hypertension    -Patient does not appear volume overloaded at this time however given his heart failure history and some peripheral edema will hold off on administration of IV fluids.    -Suspect hypertensive related and progression of his chronic kidney disease    -Bilateral renal ultrasound shows bilateral increased echogenicity consistent with medical renal disease but no hydronephrosis.    -Repeat TTE shows grade 2 diastolic dysfunction with normal EF with left ventricular wall thickness consistent with severe concentric hypertrophy likely related to hypertension.   Which was also likely the source of his progressive kidney disease.    -Nephrology consultation placed, appreciate input and help     COVID-19 positive    -Not hypoxemic at this time, therefore no indication for Remdesivir dexamethasone     Elevated troponin  Paroxysmal atrial fibrillation on chronic anticoagulation  Heart failure with preserved ejection fraction    -Patient chest pain-free, troponin elevation likely related to his acute renal dysfunction as patient troponin trend in the emergency department is flat.  However patient troponin significantly elevated from prior.    -We will resume patient's home medications as appropriate after medication reconciliation     COPD  Obstructive sleep apnea    -BiPAP ordered for at night usage    -Continue home inhalers once reconciled      VTE Prophylaxis:   Mechanical Order History:     None      Pharmalogical Order History:      Ordered     Dose Route Frequency Stop    12/17/21 1923  heparin (porcine) 5000 UNIT/ML injection 5,000 Units  Status:  Discontinued         5,000 Units SC Every 8 Hours Scheduled 12/17/21 2107 12/17/21 2106  apixaban (ELIQUIS) tablet 5 mg         5 mg PO 2 Times Daily --                Disposition Home once medically stable and improved and cleared by nephrology.    Jaun Quiles DO  [facility]  12/18/21  20:23 EST

## 2021-12-20 LAB
ALBUMIN SERPL-MCNC: 2.96 G/DL (ref 3.5–5.2)
ALBUMIN/GLOB SERPL: 1.1 G/DL
ALP SERPL-CCNC: 54 U/L (ref 39–117)
ALT SERPL W P-5'-P-CCNC: 13 U/L (ref 1–41)
ANION GAP SERPL CALCULATED.3IONS-SCNC: 9.6 MMOL/L (ref 5–15)
AST SERPL-CCNC: 12 U/L (ref 1–40)
BASOPHILS # BLD AUTO: 0.01 10*3/MM3 (ref 0–0.2)
BASOPHILS NFR BLD AUTO: 0.2 % (ref 0–1.5)
BILIRUB SERPL-MCNC: <0.2 MG/DL (ref 0–1.2)
BUN SERPL-MCNC: 74 MG/DL (ref 6–20)
BUN/CREAT SERPL: 9.9 (ref 7–25)
CALCIUM SPEC-SCNC: 7.9 MG/DL (ref 8.6–10.5)
CHLORIDE SERPL-SCNC: 108 MMOL/L (ref 98–107)
CO2 SERPL-SCNC: 17.4 MMOL/L (ref 22–29)
CREAT SERPL-MCNC: 7.47 MG/DL (ref 0.76–1.27)
DEPRECATED RDW RBC AUTO: 45.1 FL (ref 37–54)
EOSINOPHIL # BLD AUTO: 0.1 10*3/MM3 (ref 0–0.4)
EOSINOPHIL NFR BLD AUTO: 2.2 % (ref 0.3–6.2)
ERYTHROCYTE [DISTWIDTH] IN BLOOD BY AUTOMATED COUNT: 14.2 % (ref 12.3–15.4)
FERRITIN SERPL-MCNC: 126.8 NG/ML (ref 30–400)
GFR SERPL CREATININE-BSD FRML MDRD: 10 ML/MIN/1.73
GFR SERPL CREATININE-BSD FRML MDRD: ABNORMAL ML/MIN/{1.73_M2}
GLOBULIN UR ELPH-MCNC: 2.6 GM/DL
GLUCOSE SERPL-MCNC: 96 MG/DL (ref 65–99)
HCT VFR BLD AUTO: 29.3 % (ref 37.5–51)
HGB BLD-MCNC: 9.2 G/DL (ref 13–17.7)
IMM GRANULOCYTES # BLD AUTO: 0.01 10*3/MM3 (ref 0–0.05)
IMM GRANULOCYTES NFR BLD AUTO: 0.2 % (ref 0–0.5)
IRON 24H UR-MRATE: 42 MCG/DL (ref 59–158)
IRON SATN MFR SERPL: 16 % (ref 20–50)
LYMPHOCYTES # BLD AUTO: 1.27 10*3/MM3 (ref 0.7–3.1)
LYMPHOCYTES NFR BLD AUTO: 28.5 % (ref 19.6–45.3)
MAGNESIUM SERPL-MCNC: 1.7 MG/DL (ref 1.6–2.6)
MCH RBC QN AUTO: 27.3 PG (ref 26.6–33)
MCHC RBC AUTO-ENTMCNC: 31.4 G/DL (ref 31.5–35.7)
MCV RBC AUTO: 86.9 FL (ref 79–97)
MONOCYTES # BLD AUTO: 0.54 10*3/MM3 (ref 0.1–0.9)
MONOCYTES NFR BLD AUTO: 12.1 % (ref 5–12)
NEUTROPHILS NFR BLD AUTO: 2.52 10*3/MM3 (ref 1.7–7)
NEUTROPHILS NFR BLD AUTO: 56.8 % (ref 42.7–76)
NRBC BLD AUTO-RTO: 0 /100 WBC (ref 0–0.2)
PLATELET # BLD AUTO: 155 10*3/MM3 (ref 140–450)
PMV BLD AUTO: 9.5 FL (ref 6–12)
POTASSIUM SERPL-SCNC: 4.8 MMOL/L (ref 3.5–5.2)
PROT SERPL-MCNC: 5.6 G/DL (ref 6–8.5)
RBC # BLD AUTO: 3.37 10*6/MM3 (ref 4.14–5.8)
SODIUM SERPL-SCNC: 135 MMOL/L (ref 136–145)
TIBC SERPL-MCNC: 259 MCG/DL (ref 298–536)
TRANSFERRIN SERPL-MCNC: 174 MG/DL (ref 200–360)
WBC NRBC COR # BLD: 4.45 10*3/MM3 (ref 3.4–10.8)

## 2021-12-20 PROCEDURE — 82728 ASSAY OF FERRITIN: CPT | Performed by: INTERNAL MEDICINE

## 2021-12-20 PROCEDURE — 80053 COMPREHEN METABOLIC PANEL: CPT | Performed by: INTERNAL MEDICINE

## 2021-12-20 PROCEDURE — 83735 ASSAY OF MAGNESIUM: CPT | Performed by: INTERNAL MEDICINE

## 2021-12-20 PROCEDURE — 84466 ASSAY OF TRANSFERRIN: CPT | Performed by: INTERNAL MEDICINE

## 2021-12-20 PROCEDURE — 94799 UNLISTED PULMONARY SVC/PX: CPT

## 2021-12-20 PROCEDURE — 83540 ASSAY OF IRON: CPT | Performed by: INTERNAL MEDICINE

## 2021-12-20 PROCEDURE — 94660 CPAP INITIATION&MGMT: CPT

## 2021-12-20 PROCEDURE — 85025 COMPLETE CBC W/AUTO DIFF WBC: CPT | Performed by: INTERNAL MEDICINE

## 2021-12-20 PROCEDURE — 99232 SBSQ HOSP IP/OBS MODERATE 35: CPT | Performed by: INTERNAL MEDICINE

## 2021-12-20 RX ORDER — CIMETIDINE 400 MG/1
600 TABLET, FILM COATED ORAL 3 TIMES DAILY
Status: COMPLETED | OUTPATIENT
Start: 2021-12-20 | End: 2021-12-21

## 2021-12-20 RX ADMIN — CLONIDINE HYDROCHLORIDE 0.3 MG: 0.3 TABLET ORAL at 16:54

## 2021-12-20 RX ADMIN — CIMETIDINE 600 MG: 400 TABLET, FILM COATED ORAL at 11:10

## 2021-12-20 RX ADMIN — SODIUM CHLORIDE, PRESERVATIVE FREE 10 ML: 5 INJECTION INTRAVENOUS at 08:44

## 2021-12-20 RX ADMIN — AMLODIPINE BESYLATE 10 MG: 10 TABLET ORAL at 08:44

## 2021-12-20 RX ADMIN — LABETALOL HYDROCHLORIDE 300 MG: 200 TABLET, FILM COATED ORAL at 16:54

## 2021-12-20 RX ADMIN — LABETALOL HYDROCHLORIDE 300 MG: 200 TABLET, FILM COATED ORAL at 20:03

## 2021-12-20 RX ADMIN — CIMETIDINE 600 MG: 400 TABLET, FILM COATED ORAL at 20:04

## 2021-12-20 RX ADMIN — CIMETIDINE 600 MG: 400 TABLET, FILM COATED ORAL at 16:55

## 2021-12-20 RX ADMIN — TAMSULOSIN HYDROCHLORIDE 0.4 MG: 0.4 CAPSULE ORAL at 08:44

## 2021-12-20 RX ADMIN — MINOXIDIL 10 MG: 10 TABLET ORAL at 20:02

## 2021-12-20 RX ADMIN — LABETALOL HYDROCHLORIDE 300 MG: 200 TABLET, FILM COATED ORAL at 08:44

## 2021-12-20 RX ADMIN — FERROUS SULFATE TAB 325 MG (65 MG ELEMENTAL FE) 325 MG: 325 (65 FE) TAB at 08:44

## 2021-12-20 RX ADMIN — APIXABAN 5 MG: 5 TABLET, FILM COATED ORAL at 20:03

## 2021-12-20 RX ADMIN — APIXABAN 5 MG: 5 TABLET, FILM COATED ORAL at 08:44

## 2021-12-20 RX ADMIN — ALPRAZOLAM 1 MG: 1 TABLET ORAL at 08:44

## 2021-12-20 RX ADMIN — BUPROPION HYDROCHLORIDE 300 MG: 150 TABLET, FILM COATED, EXTENDED RELEASE ORAL at 08:44

## 2021-12-20 RX ADMIN — CLONIDINE HYDROCHLORIDE 0.3 MG: 0.3 TABLET ORAL at 20:03

## 2021-12-20 RX ADMIN — CLONIDINE HYDROCHLORIDE 0.3 MG: 0.3 TABLET ORAL at 08:44

## 2021-12-20 RX ADMIN — MONTELUKAST SODIUM 10 MG: 10 TABLET, COATED ORAL at 20:03

## 2021-12-20 RX ADMIN — ALPRAZOLAM 1 MG: 1 TABLET ORAL at 20:02

## 2021-12-20 RX ADMIN — ISOSORBIDE MONONITRATE 120 MG: 60 TABLET ORAL at 08:44

## 2021-12-20 RX ADMIN — ATORVASTATIN CALCIUM 40 MG: 40 TABLET, FILM COATED ORAL at 20:03

## 2021-12-20 NOTE — PLAN OF CARE
Goal Outcome Evaluation:         Patient resting quietly in bed at this time. VSS and no new issues noted. Will continue to monitor and follow plan of care.

## 2021-12-20 NOTE — PROGRESS NOTES
Nephrology Progress Note      Subjective     Patient is feeling better denies any shortness of breath or cough    Objective       Vital signs :     Temp:  [97.1 °F (36.2 °C)-97.8 °F (36.6 °C)] 97.1 °F (36.2 °C)  Heart Rate:  [70-78] 73  Resp:  [18-20] 20  BP: (141-151)/(75-90) 150/80    I/O last 3 completed shifts:  In: 1520 [P.O.:1520]  Out: 2050 [Urine:2050]  I/O this shift:  In: 360 [P.O.:360]  Out: 700 [Urine:700]    Physical Exam:    General Appearance : alert, pleasant, appears stated age, cooperative   Head  :  normocephalic, without obvious abnormality and atraumatic  Eyes  :  conjunctivae and sclerae normal, no icterus, no pallor and PERRLA  Neck  :  no adenopathy, suppple, no carotid bruit, no JVD   Lungs : clear to auscultation, respirations regular  Heart :  regular rhythm & normal rate, normal S1, S2 and no murmur, no rub  Abdomen : normal bowel sounds, no masses, no hepatomegaly, no splenomegaly, soft non-tender and no guarding  Extremities : moves extremities well, no edema, no cyanosis and no redness  Skin :  no bleeding, bruising or rash  Neurologic :   orientated to person, place, time and situation, Grossly no focal deficits  Acess :       Laboratory Data :       WBC WBC   Date Value Ref Range Status   12/20/2021 4.45 3.40 - 10.80 10*3/mm3 Final   12/19/2021 3.31 (L) 3.40 - 10.80 10*3/mm3 Final   12/18/2021 3.28 (L) 3.40 - 10.80 10*3/mm3 Final   12/17/2021 4.62 3.40 - 10.80 10*3/mm3 Final      HGB Hemoglobin   Date Value Ref Range Status   12/20/2021 9.2 (L) 13.0 - 17.7 g/dL Final   12/19/2021 8.9 (L) 13.0 - 17.7 g/dL Final   12/18/2021 9.1 (L) 13.0 - 17.7 g/dL Final   12/17/2021 10.2 (L) 13.0 - 17.7 g/dL Final      HCT Hematocrit   Date Value Ref Range Status   12/20/2021 29.3 (L) 37.5 - 51.0 % Final   12/19/2021 28.6 (L) 37.5 - 51.0 % Final   12/18/2021 30.0 (L) 37.5 - 51.0 % Final   12/17/2021 33.3 (L) 37.5 - 51.0 % Final      Platlets No results found for: LABPLAT   MCV MCV   Date Value Ref  Range Status   12/20/2021 86.9 79.0 - 97.0 fL Final   12/19/2021 88.0 79.0 - 97.0 fL Final   12/18/2021 89.0 79.0 - 97.0 fL Final   12/17/2021 89.8 79.0 - 97.0 fL Final          Sodium Sodium   Date Value Ref Range Status   12/20/2021 135 (L) 136 - 145 mmol/L Final   12/19/2021 136 136 - 145 mmol/L Final   12/18/2021 134 (L) 136 - 145 mmol/L Final   12/17/2021 139 136 - 145 mmol/L Final      Potassium Potassium   Date Value Ref Range Status   12/20/2021 4.8 3.5 - 5.2 mmol/L Final   12/19/2021 4.6 3.5 - 5.2 mmol/L Final   12/18/2021 5.0 3.5 - 5.2 mmol/L Final     Comment:     Specimen hemolyzed.  Results may be affected. 1+ Hemolysis    12/17/2021 5.3 (H) 3.5 - 5.2 mmol/L Final     Comment:     Slight hemolysis detected by analyzer. Results may be affected.      Chloride Chloride   Date Value Ref Range Status   12/20/2021 108 (H) 98 - 107 mmol/L Final   12/19/2021 106 98 - 107 mmol/L Final   12/18/2021 107 98 - 107 mmol/L Final   12/17/2021 109 (H) 98 - 107 mmol/L Final      CO2 CO2   Date Value Ref Range Status   12/20/2021 17.4 (L) 22.0 - 29.0 mmol/L Final   12/19/2021 18.0 (L) 22.0 - 29.0 mmol/L Final   12/18/2021 16.4 (L) 22.0 - 29.0 mmol/L Final   12/17/2021 16.7 (L) 22.0 - 29.0 mmol/L Final      BUN BUN   Date Value Ref Range Status   12/20/2021 74 (H) 6 - 20 mg/dL Final   12/19/2021 73 (H) 6 - 20 mg/dL Final   12/18/2021 73 (H) 6 - 20 mg/dL Final   12/17/2021 70 (H) 6 - 20 mg/dL Final      Creatinine Creatinine   Date Value Ref Range Status   12/20/2021 7.47 (H) 0.76 - 1.27 mg/dL Final   12/19/2021 7.37 (H) 0.76 - 1.27 mg/dL Final   12/18/2021 7.00 (H) 0.76 - 1.27 mg/dL Final   12/17/2021 7.55 (H) 0.76 - 1.27 mg/dL Final      Calcium Calcium   Date Value Ref Range Status   12/20/2021 7.9 (L) 8.6 - 10.5 mg/dL Final   12/19/2021 7.8 (L) 8.6 - 10.5 mg/dL Final   12/18/2021 7.8 (L) 8.6 - 10.5 mg/dL Final   12/17/2021 7.9 (L) 8.6 - 10.5 mg/dL Final      PO4 No results found for: CAPO4   Albumin Albumin   Date Value  Ref Range Status   12/20/2021 2.96 (L) 3.50 - 5.20 g/dL Final   12/19/2021 2.73 (L) 3.50 - 5.20 g/dL Final   12/18/2021 2.55 (L) 3.50 - 5.20 g/dL Final   12/17/2021 3.20 (L) 3.50 - 5.20 g/dL Final      Magnesium Magnesium   Date Value Ref Range Status   12/20/2021 1.7 1.6 - 2.6 mg/dL Final   12/19/2021 1.8 1.6 - 2.6 mg/dL Final   12/17/2021 1.8 1.6 - 2.6 mg/dL Final          PTH               No results found for: PTH      Medications :     ALPRAZolam, 1 mg, Oral, BID  amLODIPine, 10 mg, Oral, Daily  apixaban, 5 mg, Oral, BID  atorvastatin, 40 mg, Oral, Nightly  buPROPion XL, 300 mg, Oral, QAM  cloNIDine, 0.3 mg, Oral, TID  ferrous sulfate, 325 mg, Oral, Daily With Breakfast  isosorbide mononitrate, 120 mg, Oral, Daily  labetalol, 300 mg, Oral, TID  minoxidil, 10 mg, Oral, Nightly  montelukast, 10 mg, Oral, Nightly  sodium chloride, 10 mL, Intravenous, Q12H  tamsulosin, 0.4 mg, Oral, Daily             Assessment/Plan     1-Acute kidney injury on chronic kidney disease stage IV:  Patient's creatinine 7.6  is making urine, will do cimetidine based 24-hour urine creatinine clearance and decide about dialysis after we get the results of it as patient has no shortness of breath no other signs of uremia at this time so I do not see any acute need of dialysis today , according the patient he was very close to dialysis but the creatinine we got from last year was 2.6 now it is up to 8.0 and now at 7.3,  it can be just dehydration but patient has swelling of the leg that time will hold off on fluids and asked patient to drink more I will give 1 dose of albumin today again.  Patient is high risk for dialysis     2-Accelerated hypertension: Already started on medication slowly getting better     3-paroxysmal atrial fibrillation     4-congestive heart failure with normal EF     5-COVID-19 positive    6. Anemia we will check iron studies    Discussed with RN     Plan of care discussed with pt, answered all questions, patient  verbalized understanding and agreed.    S Galen Shultz MD  12/20/21  06:40 EST

## 2021-12-20 NOTE — CASE MANAGEMENT/SOCIAL WORK
Discharge Planning Assessment   [place]     Patient Name: Sudarshan Keene  MRN: [id number]  Today's Date: 12/20/2021    Admit Date: 12/17/2021     Discharge Needs Assessment     Row Name 12/20/21 1456       Living Environment    Lives With significant other    Name(s) of Who Lives With Patient Lives at home with significant other.    Current Living Arrangements home/apartment/condo    Primary Care Provided by self    Provides Primary Care For no one    Family Caregiver if Needed significant other    Quality of Family Relationships helpful; involved; supportive       Transition Planning    Patient/Family Anticipates Transition to home with family    Transportation Anticipated family or friend will provide       Discharge Needs Assessment    Equipment Currently Used at Home none               Discharge Plan     Row Name 12/20/21 1458       Plan    Plan Pt admitted on 12/17/21.  SS received consult per ns for discharge planning.  SS spoke with pt on this date.  Pt lives at home with significant other and plans to return home at discharge.  Pt currently does not utilize home health or DME.  Pt stated possibility of need for outpatient dialysis in the future.  SS will follow and assist with discharge needs.              Continued Care and Services - Admitted Since 12/17/2021    Coordination has not been started for this encounter.          Demographic Summary     Row Name 12/20/21 1455       General Information    Admission Type inpatient    Referral Source nursing    Reason for Consult discharge planning    Preferred Language English              SABIHA Santiago

## 2021-12-20 NOTE — PROGRESS NOTES
"Assisted By: Patient's wife was on video chat while I saw the patient under Covid isolation precautions    CC: Follow-up on COVID-19 as well as ANSELMO on CKD    Interview History/HPI: Patient states he feels \"fine\" and wants to go home.  He denies any chest pain states he is breathing well, no fever chills.  He has been asymptomatic with Covid, he has been vaccinated with 2 vaccines last dose being in September.    ROS:     Vitals:    12/20/21 1242   BP: 132/72   Pulse: 81   Resp:    Temp:    SpO2:          Intake/Output Summary (Last 24 hours) at 12/20/2021 1415  Last data filed at 12/20/2021 1242  Gross per 24 hour   Intake 820 ml   Output 3200 ml   Net -2380 ml       EXAM: 132/72, 81, 97.2, room air saturation 97%.  He is somewhat disgruntled over having to stay, I did discuss with Dr. Shultz this a.m. he wanted to get a 24-hour urine cimetidine clearance to see if the patient needs dialysis.  Lungs clear heart regular rate and rhythm no edema      EKG:     Tele: Sinus rhythm    LABS:     Lab Results (last 48 hours)     Procedure Component Value Units Date/Time    Ferritin [[id number]]  (Normal) Collected: 12/20/21 0555    Specimen: Blood Updated: 12/20/21 0636     Ferritin 126.80 ng/mL     Narrative:      Results may be falsely decreased if patient taking Biotin.      Comprehensive Metabolic Panel [[id number]]  (Abnormal) Collected: 12/20/21 0555    Specimen: Blood Updated: 12/20/21 0631     Glucose 96 mg/dL      BUN 74 mg/dL      Creatinine 7.47 mg/dL      Sodium 135 mmol/L      Potassium 4.8 mmol/L      Chloride 108 mmol/L      CO2 17.4 mmol/L      Calcium 7.9 mg/dL      Total Protein 5.6 g/dL      Albumin 2.96 g/dL      ALT (SGPT) 13 U/L      AST (SGOT) 12 U/L      Alkaline Phosphatase 54 U/L      Total Bilirubin <0.2 mg/dL      eGFR Non  Amer --     Comment: <15 Indicative of kidney failure.        eGFR  African Amer 10 mL/min/1.73      Comment: <15 Indicative of kidney failure.        Globulin 2.6 gm/dL  "     A/G Ratio 1.1 g/dL      BUN/Creatinine Ratio 9.9     Anion Gap 9.6 mmol/L     Narrative:      GFR Normal >60  Chronic Kidney Disease <60  Kidney Failure <15      Iron Profile [[id number]]  (Abnormal) Collected: 12/20/21 0555    Specimen: Blood Updated: 12/20/21 0631     Iron 42 mcg/dL      Iron Saturation 16 %      Transferrin 174 mg/dL      TIBC 259 mcg/dL     Magnesium [[id number]]  (Normal) Collected: 12/20/21 0555    Specimen: Blood Updated: 12/20/21 0631     Magnesium 1.7 mg/dL     CBC & Differential [[id number]]  (Abnormal) Collected: 12/20/21 0555    Specimen: Blood Updated: 12/20/21 0601    Narrative:      The following orders were created for panel order CBC & Differential.  Procedure                               Abnormality         Status                     ---------                               -----------         ------                     CBC Auto Differential[[id number]]        Abnormal            Final result                 Please view results for these tests on the individual orders.    CBC Auto Differential [[id number]]  (Abnormal) Collected: 12/20/21 0555    Specimen: Blood Updated: 12/20/21 0601     WBC 4.45 10*3/mm3      RBC 3.37 10*6/mm3      Hemoglobin 9.2 g/dL      Hematocrit 29.3 %      MCV 86.9 fL      MCH 27.3 pg      MCHC 31.4 g/dL      RDW 14.2 %      RDW-SD 45.1 fl      MPV 9.5 fL      Platelets 155 10*3/mm3      Neutrophil % 56.8 %      Lymphocyte % 28.5 %      Monocyte % 12.1 %      Eosinophil % 2.2 %      Basophil % 0.2 %      Immature Grans % 0.2 %      Neutrophils, Absolute 2.52 10*3/mm3      Lymphocytes, Absolute 1.27 10*3/mm3      Monocytes, Absolute 0.54 10*3/mm3      Eosinophils, Absolute 0.10 10*3/mm3      Basophils, Absolute 0.01 10*3/mm3      Immature Grans, Absolute 0.01 10*3/mm3      nRBC 0.0 /100 WBC     Protein / Creatinine Ratio, Urine - Urine, Clean Catch [[id number]]  (Abnormal) Collected: 12/19/21 1039    Specimen: Urine, Clean Catch Updated: 12/19/21 9353      Protein/Creatinine Ratio, Urine 3,057.7 mg/G Crea      Creatinine, Urine 64.1 mg/dL      Total Protein, Urine 196.0 mg/dL     Blood Culture - Blood, Arm, Left [[id number]]  (Normal) Collected: 12/17/21 1710    Specimen: Blood from Arm, Left Updated: 12/19/21 1715     Blood Culture No growth at 2 days    Blood Culture - Blood, Arm, Right [[id number]]  (Normal) Collected: 12/17/21 1710    Specimen: Blood from Arm, Right Updated: 12/19/21 1715     Blood Culture No growth at 2 days    Sodium, Urine, Random - Urine, Clean Catch [[id number]] Collected: 12/19/21 1049    Specimen: Urine, Clean Catch Updated: 12/19/21 1103     Sodium, Urine 43 mmol/L     Narrative:      Reference intervals for random urine have not been established.  Clinical usage is dependent upon physician's interpretation in combination with other laboratory tests.       Chloride, Urine, Random - Urine, Clean Catch [[id number]] Collected: 12/19/21 1049    Specimen: Urine, Clean Catch Updated: 12/19/21 1103     Chloride, Urine 32 mmol/L     Narrative:      Reference intervals for random urine have not been established.  Clinical usage is dependent upon physician's interpretation in combination with other laboratory tests.       Potassium, Urine, Random - Urine, Clean Catch [[id number]] Collected: 12/19/21 1049    Specimen: Urine, Clean Catch Updated: 12/19/21 1102     Potassium, Urine 21.6 mmol/L     Narrative:      Reference intervals for random urine have not been established.  Clinical usage is dependent upon physician's interpretation in combination with other laboratory tests.       Comprehensive Metabolic Panel [[id number]]  (Abnormal) Collected: 12/19/21 0418    Specimen: Blood Updated: 12/19/21 0530     Glucose 101 mg/dL      BUN 73 mg/dL      Creatinine 7.37 mg/dL      Sodium 136 mmol/L      Potassium 4.6 mmol/L      Chloride 106 mmol/L      CO2 18.0 mmol/L      Calcium 7.8 mg/dL      Total Protein 5.4 g/dL      Albumin 2.73 g/dL      ALT (SGPT) 13 U/L       AST (SGOT) 13 U/L      Alkaline Phosphatase 52 U/L      Total Bilirubin 0.2 mg/dL      eGFR Non  Amer --     Comment: <15 Indicative of kidney failure.        eGFR  African Amer 10 mL/min/1.73      Comment: <15 Indicative of kidney failure.        Globulin 2.7 gm/dL      A/G Ratio 1.0 g/dL      BUN/Creatinine Ratio 9.9     Anion Gap 12.0 mmol/L     Narrative:      GFR Normal >60  Chronic Kidney Disease <60  Kidney Failure <15      Magnesium [[id number]]  (Normal) Collected: 12/19/21 0418    Specimen: Blood Updated: 12/19/21 0530     Magnesium 1.8 mg/dL     CBC & Differential [[id number]]  (Abnormal) Collected: 12/19/21 0418    Specimen: Blood Updated: 12/19/21 0508    Narrative:      The following orders were created for panel order CBC & Differential.  Procedure                               Abnormality         Status                     ---------                               -----------         ------                     CBC Auto Differential[[id number]]        Abnormal            Final result                 Please view results for these tests on the individual orders.    CBC Auto Differential [[id number]]  (Abnormal) Collected: 12/19/21 0418    Specimen: Blood Updated: 12/19/21 0508     WBC 3.31 10*3/mm3      RBC 3.25 10*6/mm3      Hemoglobin 8.9 g/dL      Hematocrit 28.6 %      MCV 88.0 fL      MCH 27.4 pg      MCHC 31.1 g/dL      RDW 14.4 %      RDW-SD 45.6 fl      MPV 10.0 fL      Platelets 155 10*3/mm3      Neutrophil % 46.9 %      Lymphocyte % 33.5 %      Monocyte % 15.7 %      Eosinophil % 3.6 %      Basophil % 0.3 %      Immature Grans % 0.0 %      Neutrophils, Absolute 1.55 10*3/mm3      Lymphocytes, Absolute 1.11 10*3/mm3      Monocytes, Absolute 0.52 10*3/mm3      Eosinophils, Absolute 0.12 10*3/mm3      Basophils, Absolute 0.01 10*3/mm3      Immature Grans, Absolute 0.00 10*3/mm3      nRBC 0.0 /100 WBC                Radiology:    Imaging Results (Last 72 Hours)     Procedure Component Value  Units Date/Time    US Renal Bilateral [[id number]] Collected: 12/18/21 1911     Updated: 12/18/21 1913    Narrative:      US Renal Comp    HISTORY:  Acute renal failure. COVID-19 positive.    COMPARISON:    None available.    TECHNIQUE:  Grayscale ultrasound imaging of both kidneys and the urinary bladder was performed.    FINDINGS:  Both kidneys are normal in size. Diffusely echogenic renal parenchyma throughout both kidneys. No evidence of urinary obstruction. No visible nephrolithiasis, renal mass, perinephric fluid collection or renal parenchymal scarring. Renal length measures  9.9 cm on the right and 2.3 cm on the left.      Impression:      Increased bilateral renal echogenicity, consistent with medical renal disease. No hydronephrosis.    Signer Name: Isidoro Aldrich MD   Signed: 12/18/2021 7:11 PM   Workstation Name: [email or web address]    [facility]    CT Abdomen Pelvis Without Contrast [[id number]] Collected: 12/17/21 1553     Updated: 12/17/21 1556    Narrative:      EXAM:    CT Abdomen and Pelvis Without Intravenous Contrast     EXAM DATE:    12/17/2021 3:39 PM     CLINICAL HISTORY:    ANSELMO     TECHNIQUE:    Axial computed tomography images of the abdomen and pelvis without  intravenous contrast.  Sagittal and coronal reformatted images were  created and reviewed.  This CT exam was performed using one or more of  the following dose reduction techniques:  automated exposure control,  adjustment of the mA and/or kV according to patient size, and/or use of  iterative reconstruction technique.     COMPARISON:    No relevant prior studies available.     FINDINGS:    LUNG BASES:  Unremarkable.  No mass.  No consolidation.      ABDOMEN:    LIVER:  Unremarkable.    GALLBLADDER AND BILE DUCTS:  Unremarkable.  No calcified stones.  No  ductal dilation.    PANCREAS:  Unremarkable.  No ductal dilation.    SPLEEN:  Unremarkable.  No splenomegaly.    ADRENALS:  Unremarkable.  No mass.    KIDNEYS AND  URETERS:  Unremarkable.  No obstructing stones.  No  hydronephrosis.    STOMACH AND BOWEL:  Unremarkable.  No obstruction.  No mucosal  thickening.      PELVIS:    APPENDIX:  No findings to suggest acute appendicitis.    BLADDER:  Unremarkable.  No stones.    REPRODUCTIVE:  Unremarkable as visualized.      ABDOMEN and PELVIS:    INTRAPERITONEAL SPACE:  Unremarkable.  No free air.  No significant  fluid collection.    BONES/JOINTS:  No acute fracture.  No dislocation.    SOFT TISSUES:  Unremarkable.    VASCULATURE:  Unremarkable.  No abdominal aortic aneurysm.    LYMPH NODES:  Unremarkable.  No enlarged lymph nodes.       Impression:        No acute findings in the abdomen or pelvis.     This report was finalized on 12/17/2021 3:54 PM by Dr. Stu Simpson MD.       CT Chest Without Contrast Diagnostic [[id number]] Collected: 12/17/21 1553     Updated: 12/17/21 1555    Narrative:      EXAM:    CT Chest Without Intravenous Contrast     EXAM DATE:    12/17/2021 3:39 PM     CLINICAL HISTORY:    SOB     TECHNIQUE:    Axial computed tomography images of the chest without intravenous  contrast.  Sagittal and coronal reformatted images were created and  reviewed.  This CT exam was performed using one or more of the following  dose reduction techniques:  automated exposure control, adjustment of  the mA and/or kV according to patient size, and/or use of iterative  reconstruction technique.     COMPARISON:    12/22/2015     FINDINGS:    LUNGS:  Unremarkable.  No mass.  No consolidation.    PLEURAL SPACE:  Tiny right pleural effusion.  No pneumothorax.    HEART:  Cardiomegaly.  No significant pericardial effusion.    BONES/JOINTS:  Unremarkable.  No acute fracture.  No dislocation.    SOFT TISSUES:  Unremarkable.    VASCULATURE:  Unremarkable.  No thoracic aortic aneurysm.    LYMPH NODES:  Unremarkable.  No enlarged lymph nodes.       Impression:      1.  Tiny right pleural effusion.  2.  Cardiomegaly.     This report was  finalized on 12/17/2021 3:53 PM by Dr. Stu Simpson MD.       XR Chest 1 View [[id number]] Collected: 12/17/21 1434     Updated: 12/17/21 1440    Narrative:      EXAM:    XR Chest, 1 View     EXAM DATE:    12/17/2021 1:52 PM     CLINICAL HISTORY:    Chest pain protocol     TECHNIQUE:    Frontal view of the chest.     COMPARISON:    08/02/2020     FINDINGS:    LUNGS:  Unremarkable.  No consolidation.    PLEURAL SPACE:  Minimal blunting of bilateral costophrenic angle  suggestive of tiny pleural effusion.  No pneumothorax.    HEART:  Cardiomegaly.    MEDIASTINUM:  Unremarkable.    BONES/JOINTS:  Unremarkable.    TUBES, LINES AND DEVICES:  Left Port-A-Cath with tip in SVC.       Impression:      1.  Cardiomegaly.  2.  Minimal blunting of bilateral costophrenic angle suggestive of tiny  pleural effusion.     This report was finalized on 12/17/2021 2:38 PM by Dr. Stu Simpson MD.             Results for orders placed during the hospital encounter of 12/17/21    Adult Transthoracic Echo Complete w/ Color, Spectral and Contrast if necessary per protocol    Interpretation Summary  · Left ventricular wall thickness is consistent with severe concentric hypertrophy.  · Left ventricular ejection fraction appears to be 61 - 65%. Left ventricular systolic function is normal.  · Left ventricular diastolic function is consistent with (grade II w/high LAP) pseudonormalization.  · Moderate to severe mitral valve regurgitation is present with an eccentric jet noted.      Assessment/Plan:   Chronic kidney disease at least stage IV, patient may now be end-stage, nephrology is following, 24 urine to be done today, if creatinine clearance less than 15 consideration of instigation of dialysis per nephrology.  Patient initially was going to leave but he is willing to stay to get this done.  Renal ultrasound just showed increased echogenicity.    Moderate to severe TR, can be followed up as an outpatient    Asymptomatic COVID-19 and  saturation on room air still adequate, no need for medical intervention at this time.  He was vaccinated.    Hypertension, controlled, continue Norvasc, Catapres, Normodyne, minoxidil and Imdur.  It has been difficult to control in the past.    Paroxysmal atrial fibrillation now sinus, patient is on Eliquis for stroke prevention.    KATHLEEN, he did have a CPAP at home but he states it has been recalled and he is waiting to get another one back.    Elevated troponin over baseline, recheck trend in the a.m, EF normal and asymptomatic.  EKG overall unchanged    DVT prophylaxis, Eliquis will serve for this as well    Morbid obesity by BMI adversely affecting his overall health    Leukopenia probably related to Covid, improved    Anemia, most consistent with chronic disease by indices stable    Disposition Home    Terrell Stout MD

## 2021-12-20 NOTE — PLAN OF CARE
Goal Outcome Evaluation:    Patient resting in bed with no complaints. Patient VSS. Currently collecting 24 hour urine. Will continue to monitor.

## 2021-12-21 VITALS
TEMPERATURE: 97.2 F | OXYGEN SATURATION: 98 % | DIASTOLIC BLOOD PRESSURE: 78 MMHG | SYSTOLIC BLOOD PRESSURE: 122 MMHG | BODY MASS INDEX: 45.1 KG/M2 | HEART RATE: 82 BPM | RESPIRATION RATE: 18 BRPM | HEIGHT: 70 IN | WEIGHT: 315 LBS

## 2021-12-21 LAB
ALBUMIN SERPL-MCNC: 2.84 G/DL (ref 3.5–5.2)
ALBUMIN/GLOB SERPL: 1.1 G/DL
ALP SERPL-CCNC: 55 U/L (ref 39–117)
ALT SERPL W P-5'-P-CCNC: 12 U/L (ref 1–41)
ANION GAP SERPL CALCULATED.3IONS-SCNC: 12.1 MMOL/L (ref 5–15)
APTT PPP: 33.5 SECONDS (ref 25.5–35.4)
AST SERPL-CCNC: 11 U/L (ref 1–40)
BASOPHILS # BLD AUTO: 0.01 10*3/MM3 (ref 0–0.2)
BASOPHILS NFR BLD AUTO: 0.3 % (ref 0–1.5)
BILIRUB SERPL-MCNC: <0.2 MG/DL (ref 0–1.2)
BUN SERPL-MCNC: 79 MG/DL (ref 6–20)
BUN/CREAT SERPL: 10 (ref 7–25)
CALCIUM SPEC-SCNC: 8.1 MG/DL (ref 8.6–10.5)
CHLORIDE SERPL-SCNC: 107 MMOL/L (ref 98–107)
CO2 SERPL-SCNC: 16.9 MMOL/L (ref 22–29)
CREAT SERPL-MCNC: 7.9 MG/DL (ref 0.76–1.27)
DEPRECATED RDW RBC AUTO: 44.4 FL (ref 37–54)
EOSINOPHIL # BLD AUTO: 0.07 10*3/MM3 (ref 0–0.4)
EOSINOPHIL NFR BLD AUTO: 2.2 % (ref 0.3–6.2)
ERYTHROCYTE [DISTWIDTH] IN BLOOD BY AUTOMATED COUNT: 14 % (ref 12.3–15.4)
GFR SERPL CREATININE-BSD FRML MDRD: 9 ML/MIN/1.73
GFR SERPL CREATININE-BSD FRML MDRD: ABNORMAL ML/MIN/{1.73_M2}
GLOBULIN UR ELPH-MCNC: 2.7 GM/DL
GLUCOSE SERPL-MCNC: 89 MG/DL (ref 65–99)
HCT VFR BLD AUTO: 27.8 % (ref 37.5–51)
HGB BLD-MCNC: 8.8 G/DL (ref 13–17.7)
IMM GRANULOCYTES # BLD AUTO: 0.01 10*3/MM3 (ref 0–0.05)
IMM GRANULOCYTES NFR BLD AUTO: 0.3 % (ref 0–0.5)
INR PPP: 0.95 (ref 0.9–1.1)
LYMPHOCYTES # BLD AUTO: 0.93 10*3/MM3 (ref 0.7–3.1)
LYMPHOCYTES NFR BLD AUTO: 28.7 % (ref 19.6–45.3)
MAGNESIUM SERPL-MCNC: 1.7 MG/DL (ref 1.6–2.6)
MCH RBC QN AUTO: 27.6 PG (ref 26.6–33)
MCHC RBC AUTO-ENTMCNC: 31.7 G/DL (ref 31.5–35.7)
MCV RBC AUTO: 87.1 FL (ref 79–97)
MONOCYTES # BLD AUTO: 0.45 10*3/MM3 (ref 0.1–0.9)
MONOCYTES NFR BLD AUTO: 13.9 % (ref 5–12)
NEUTROPHILS NFR BLD AUTO: 1.77 10*3/MM3 (ref 1.7–7)
NEUTROPHILS NFR BLD AUTO: 54.6 % (ref 42.7–76)
NRBC BLD AUTO-RTO: 0 /100 WBC (ref 0–0.2)
PLATELET # BLD AUTO: 167 10*3/MM3 (ref 140–450)
PMV BLD AUTO: 9.8 FL (ref 6–12)
POTASSIUM SERPL-SCNC: 5.3 MMOL/L (ref 3.5–5.2)
PROT SERPL-MCNC: 5.5 G/DL (ref 6–8.5)
PROTHROMBIN TIME: 13.1 SECONDS (ref 12.8–14.5)
RBC # BLD AUTO: 3.19 10*6/MM3 (ref 4.14–5.8)
SODIUM SERPL-SCNC: 136 MMOL/L (ref 136–145)
WBC NRBC COR # BLD: 3.24 10*3/MM3 (ref 3.4–10.8)

## 2021-12-21 PROCEDURE — 99239 HOSP IP/OBS DSCHRG MGMT >30: CPT | Performed by: INTERNAL MEDICINE

## 2021-12-21 PROCEDURE — 94660 CPAP INITIATION&MGMT: CPT

## 2021-12-21 PROCEDURE — 94799 UNLISTED PULMONARY SVC/PX: CPT

## 2021-12-21 PROCEDURE — 83735 ASSAY OF MAGNESIUM: CPT | Performed by: INTERNAL MEDICINE

## 2021-12-21 PROCEDURE — 80053 COMPREHEN METABOLIC PANEL: CPT | Performed by: INTERNAL MEDICINE

## 2021-12-21 PROCEDURE — 85025 COMPLETE CBC W/AUTO DIFF WBC: CPT | Performed by: INTERNAL MEDICINE

## 2021-12-21 PROCEDURE — 85730 THROMBOPLASTIN TIME PARTIAL: CPT | Performed by: INTERNAL MEDICINE

## 2021-12-21 PROCEDURE — 82570 ASSAY OF URINE CREATININE: CPT | Performed by: INTERNAL MEDICINE

## 2021-12-21 PROCEDURE — 81050 URINALYSIS VOLUME MEASURE: CPT | Performed by: INTERNAL MEDICINE

## 2021-12-21 PROCEDURE — 85610 PROTHROMBIN TIME: CPT | Performed by: INTERNAL MEDICINE

## 2021-12-21 RX ORDER — HEPARIN SODIUM (PORCINE) LOCK FLUSH IV SOLN 100 UNIT/ML 100 UNIT/ML
SOLUTION INTRAVENOUS
Status: DISCONTINUED
Start: 2021-12-21 | End: 2021-12-21 | Stop reason: HOSPADM

## 2021-12-21 RX ADMIN — ISOSORBIDE MONONITRATE 120 MG: 60 TABLET ORAL at 08:29

## 2021-12-21 RX ADMIN — CLONIDINE HYDROCHLORIDE 0.3 MG: 0.3 TABLET ORAL at 08:29

## 2021-12-21 RX ADMIN — FERROUS SULFATE TAB 325 MG (65 MG ELEMENTAL FE) 325 MG: 325 (65 FE) TAB at 08:29

## 2021-12-21 RX ADMIN — AMLODIPINE BESYLATE 10 MG: 10 TABLET ORAL at 08:29

## 2021-12-21 RX ADMIN — SODIUM ZIRCONIUM CYCLOSILICATE 10 G: 10 POWDER, FOR SUSPENSION ORAL at 11:12

## 2021-12-21 RX ADMIN — BUPROPION HYDROCHLORIDE 300 MG: 150 TABLET, FILM COATED, EXTENDED RELEASE ORAL at 08:28

## 2021-12-21 RX ADMIN — SODIUM CHLORIDE, PRESERVATIVE FREE 10 ML: 5 INJECTION INTRAVENOUS at 08:31

## 2021-12-21 RX ADMIN — TAMSULOSIN HYDROCHLORIDE 0.4 MG: 0.4 CAPSULE ORAL at 08:29

## 2021-12-21 RX ADMIN — APIXABAN 5 MG: 5 TABLET, FILM COATED ORAL at 08:29

## 2021-12-21 RX ADMIN — CIMETIDINE 600 MG: 400 TABLET, FILM COATED ORAL at 08:29

## 2021-12-21 RX ADMIN — ALPRAZOLAM 1 MG: 1 TABLET ORAL at 08:29

## 2021-12-21 RX ADMIN — LABETALOL HYDROCHLORIDE 300 MG: 200 TABLET, FILM COATED ORAL at 08:29

## 2021-12-21 NOTE — PAYOR COMM NOTE
"[facility]  NPI:[id number]    Utilization Review  Contact: Lara Mckinney RN  Phone: [phone]  Fax:[phone]    DISCHARGE NOTIFICATION   Pending auth # [id number]  Jassi, [place] (46 y.o. Male)             Date of Birth Social Security Number Address Home Phone MRN    1975  [address] [place] [id number] [phone] [id number]    Voodoo Marital Status             None Single       Admission Date Admission Type Admitting Provider Attending Provider Department, Room/Bed    12/17/21 Emergency Jaun Quiles DO  [facility], 3321/1P    Discharge Date Discharge Disposition Discharge Destination          12/21/2021 Left Against Medical Advice              Attending Provider: (none)   Allergies: No Known Allergies    Isolation: Enh Drop/Con   Infection: COVID (confirmed) (12/19/21)   Code Status: CPR   Advance Care Planning Activity    Ht: 176.5 cm (69.5\")   Wt: 148 kg (326 lb)    Admission Cmt: None   Principal Problem: COVID-19 virus detected [U07.1]                 Active Insurance as of 12/17/2021     Primary Coverage     Payor Plan Insurance Group Employer/Plan Group    ANTHEM MEDICARE REPLACEMENT ANTHEM MEDICARE ADVANTAGE [id number]     Payor Plan Address Payor Plan Phone Number Payor Plan Fax Number Effective Dates    [address] [phone]  1/1/2020 - None Entered    [facility] [address]       Subscriber Name Subscriber Birth Date Member ID       JASSI,ELISHA 1975 [id number]           Secondary Coverage     Payor Plan Insurance Group Employer/Plan Group    WELL[employer] [place] WELL[place]ARE MEDICAID      Payor Plan Address Payor Plan Phone Number Payor Plan Fax Number Effective Dates    [address] [address] [phone]  8/1/2020 - None Entered    [facility] [address]       Subscriber Name Subscriber Birth Date Member ID       JASSIELISHA 1975 [id number]                 Emergency Contacts      (Rel.) Home Phone Work Phone Mobile " "Phone    HUAN LUI (Other) [phone] -- --    holland caurso (Mother) -- -- [phone]               Discharge Summary      Terrell Stout MD at 12/21/21 1430        Date of admission: 12/17/2021  Date of discharge: 12/21/2021    Principal diagnosis: ANSELMO on CKD stage IV  Secondary diagnosis:  -Hyperkalemia  -Covid diagnosis positive asymptomatic  -Morbid obesity by BMI  -Accelerated hypertension  -History of paroxysmal atrial fibrillation chronically anticoagulated  -KATHLEEN  -HFpEF symptomatic this visit  -Asx Troponemia uncertain significance flat trend possibly due to renal failure with continued preserved EF as above  -Metabolic acidosis related to his renal failure    Consultants:  /[facility] nephrology    Procedures:  Noncontrasted CT chest  Noncontrasted CT abdomen  Renal ultrasound    Exam: I talked to the patient multiple times and seen him earlier today.  He is asymptomatic from a Covid standpoint, states he feels \"fine\" and wants to go home.  His lungs are clear heart regular rate and rhythm trace edema only, vital signs: 122/78, 18, 82, 97.2 room air saturation 90%    Hospital course: Patient was admitted with hypertension and renal failure.  Patient had had prior evaluation for proteinuria without obvious cause and most likely cause was diseases accelerated hypertension.  Patient incidentally tested positive for Covid but did not require treatment.  Unfortunately patient's creatinine did not improve, patient had hyperkalemia and it was thought that he may have to have dialysis instigated.  This being the case Tagamet 24 urine was ordered and in process currently.  Patient is fairly adamant however he is going to go home for [place].  I have explained that the 24 urine will be back in the morning and we can decide if he needed dialysis at that time however if he does need dialysis then he most likely would be here for [place].  I explained that his potassium was high and this could cause a " fatal rhythm disturbance if he went home yet despite this he decided to leave AGAINST MEDICAL ADVICE.  He is well aware of the risk of doing this.  He states he has follow-up with his PCP that he will have blood work drawn and he does have follow-up with nephrology early January.  He was told certainly if he left AMA that he could return here if he is having any further problems.  I discussed risk of leaving multiple times today the patient.  Prognosis is guarded with noncompliance.  Patient did have elevated troponin, likely due to renal failure he is asymptomatic, EF was preserved and EKG only showed some nonspecific findings.  If PCP feels warranted certainly patient could undergo physiologic stress testing.  Would be high risk for any contrast because of his renal failure unless he is on dialysis.    Disposition: Home AGAINST MEDICAL ADVICE      Electronically signed by Terrell Stout MD at 12/21/21 2737

## 2021-12-21 NOTE — PLAN OF CARE
Goal Outcome Evaluation:  Plan of Care Reviewed With: patient           Outcome Summary: Pt resting in bed this shift with no complaints noted. 24 hr urine still in process. VSS. No s/s of acute distress noted. Will cont to follow POC.

## 2021-12-21 NOTE — NURSING NOTE
Pt adamant on going home. Pt states he has things he needs to take care of and does not want to be here for [place]. MD aware. MD spoke with patient as well. Pt states he understands risk and benefits of leaving.  Pt stated if he had any worsening symptoms he would seek medical attention.

## 2021-12-21 NOTE — DISCHARGE SUMMARY
"Date of admission: 12/17/2021  Date of discharge: 12/21/2021    Principal diagnosis: ANSELMO on CKD stage IV  Secondary diagnosis:  -Hyperkalemia  -Covid diagnosis positive asymptomatic  -Morbid obesity by BMI  -Accelerated hypertension  -History of paroxysmal atrial fibrillation chronically anticoagulated  -KATHLEEN  -HFpEF symptomatic this visit  -Asx Troponemia uncertain significance flat trend possibly due to renal failure with continued preserved EF as above  -Metabolic acidosis related to his renal failure    Consultants:  /[facility] neph[facility]    Procedures:  Noncontrasted CT chest  Noncontrasted CT abdomen  Renal ultrasound    Exam: I talked to the patient multiple times and seen him earlier today.  He is asymptomatic from a Covid standpoint, states he feels \"fine\" and wants to go home.  His lungs are clear heart regular rate and rhythm trace edema only, vital signs: 122/78, 18, 82, 97.2 room air saturation 90%    Hospital course: Patient was admitted with hypertension and renal failure.  Patient had had prior evaluation for proteinuria without obvious cause and most likely cause was diseases accelerated hypertension.  Patient incidentally tested positive for Covid but did not require treatment.  Unfortunately patient's creatinine did not improve, patient had hyperkalemia and it was thought that he may have to have dialysis instigated.  This being the case Tagamet 24 urine was ordered and in process currently.  Patient is fairly adamant however he is going to go home for [place].  I have explained that the 24 urine will be back in the morning and we can decide if he needed dialysis at that time however if he does need dialysis then he most likely would be here for [place].  I explained that his potassium was high and this could cause a fatal rhythm disturbance if he went home yet despite this he decided to leave AGAINST MEDICAL ADVICE.  He is well aware of the risk of doing this.  He states he has follow-up " with his PCP that he will have blood work drawn and he does have follow-up with nephrology early January.  He was told certainly if he left AMA that he could return here if he is having any further problems.  I discussed risk of leaving multiple times today the patient.  Prognosis is guarded with noncompliance.  Patient did have elevated troponin, likely due to renal failure he is asymptomatic, EF was preserved and EKG only showed some nonspecific findings.  If PCP feels warranted certainly patient could undergo physiologic stress testing.  Would be high risk for any contrast because of his renal failure unless he is on dialysis.    Disposition: Home AGAINST MEDICAL ADVICE

## 2021-12-21 NOTE — CASE MANAGEMENT/SOCIAL WORK
Case Management Discharge Note      Final Note: Patient left AMA 12/21/21.         Selected Continued Care - Discharged on 12/21/2021 Admission date: 12/17/2021 - Discharge disposition: Left Against Medical Advice     Final Discharge Disposition Code: 07 - left AMA

## 2021-12-22 LAB
BACTERIA SPEC AEROBE CULT: NORMAL
BACTERIA SPEC AEROBE CULT: NORMAL
COLLECT DURATION TIME UR: 24 HRS
CREAT UR-MCNC: 48.3 MG/DL
CREATINE 24H UR-MRATE: 1.71 G/24 HR (ref 1–2.4)
SPECIMEN VOL 24H UR: 3550 ML

## 2021-12-22 NOTE — PAYOR COMM NOTE
"CONTACT:  ADAM LUJAN MSN, APRN  UTILIZATION MANAGEMENT DEPT.  [facility]  [address] [facility], [address]  PHONE:  [phone]  FAX: [phone]    PATIENT LEFT AMA ON 12/21/21, AWAITING AUTHORIZATION DETERMINATION.    REFERENCE # [id number]    Elisha Keene (46 y.o. Male)             Date of Birth Social Security Number Address Home Phone MRN    [id number]  [address] [place] [id number] [phone] [id number]    [employer] Marital Status             None Single       Admission Date Admission Type Admitting Provider Attending Provider Department, Room/Bed    12/17/21 Emergency Jaun Quiles DO  [facility] [address] [facility], 3321/5D    Discharge Date Discharge Disposition Discharge Destination          12/21/2021 Left Against Medical Advice              Attending Provider: (none)   Allergies: No Known Allergies    Isolation: None   Infection: COVID (confirmed) (12/19/21)   Code Status: Prior   Advance Care Planning Activity    Ht: 176.5 cm (69.5\")   Wt: 148 kg (326 lb)    Admission Cmt: None   Principal Problem: COVID-19 virus detected [U07.1]                 Active Insurance as of 12/17/2021     Primary Coverage     Payor Plan Insurance Group Employer/Plan Group    ANTHEM MEDICARE REPLACEMENT ANTHEM MEDICARE ADVANTAGE [id number]     Payor Plan Address Payor Plan Phone Number Payor Plan Fax Number Effective Dates    [address] [phone]  1/1/2020 - None Entered    [facility] [phone]       Subscriber Name Subscriber Birth Date Member ID       KATHYELISHA 1975 [id number]           Secondary Coverage     Payor Plan Insurance Group Employer/Plan Group    [facility] MEDICAID      Payor Plan Address Payor Plan Phone Number Payor Plan Fax Number Effective Dates    [address] [phone]  8/1/2020 - None Entered    [facility] [id number]       Subscriber Name Subscriber Birth Date Member ID       ELISHA KEENE 1975 [id number]                 Emergency " "Contacts      (Rel.) Home Phone Work Phone Mobile Phone    HUAN LUI (Other) [phone] -- --    holland caruso (Mother) -- -- [phone]               Discharge Summary      Terrell Stout MD at 12/21/21 1430        Date of admission: 12/17/2021  Date of discharge: 12/21/2021    Principal diagnosis: ANSELMO on CKD stage IV  Secondary diagnosis:  -Hyperkalemia  -Covid diagnosis positive asymptomatic  -Morbid obesity by BMI  -Accelerated hypertension  -History of paroxysmal atrial fibrillation chronically anticoagulated  -KATHLEEN  -HFpEF symptomatic this visit  -Asx Troponemia uncertain significance flat trend possibly due to renal failure with continued preserved EF as above  -Metabolic acidosis related to his renal failure    Consultants:  /[facility] nephrology    Procedures:  Noncontrasted CT chest  Noncontrasted CT abdomen  Renal ultrasound    Exam: I talked to the patient multiple times and seen him earlier today.  He is asymptomatic from a Covid standpoint, states he feels \"fine\" and wants to go home.  His lungs are clear heart regular rate and rhythm trace edema only, vital signs: 122/78, 18, 82, 97.2 room air saturation 90%    Hospital course: Patient was admitted with hypertension and renal failure.  Patient had had prior evaluation for proteinuria without obvious cause and most likely cause was diseases accelerated hypertension.  Patient incidentally tested positive for Covid but did not require treatment.  Unfortunately patient's creatinine did not improve, patient had hyperkalemia and it was thought that he may have to have dialysis instigated.  This being the case Tagamet 24 urine was ordered and in process currently.  Patient is fairly adamant however he is going to go home for [place].  I have explained that the 24 urine will be back in the morning and we can decide if he needed dialysis at that time however if he does need dialysis then he most likely would be here for [place].  " I explained that his potassium was high and this could cause a fatal rhythm disturbance if he went home yet despite this he decided to leave AGAINST MEDICAL ADVICE.  He is well aware of the risk of doing this.  He states he has follow-up with his PCP that he will have blood work drawn and he does have follow-up with nephrology early January.  He was told certainly if he left AMA that he could return here if he is having any further problems.  I discussed risk of leaving multiple times today the patient.  Prognosis is guarded with noncompliance.  Patient did have elevated troponin, likely due to renal failure he is asymptomatic, EF was preserved and EKG only showed some nonspecific findings.  If PCP feels warranted certainly patient could undergo physiologic stress testing.  Would be high risk for any contrast because of his renal failure unless he is on dialysis.    Disposition: Home AGAINST MEDICAL ADVICE      Electronically signed by Terrell Stout MD at 12/21/21 1353

## 2022-05-27 ENCOUNTER — HOSPITAL ENCOUNTER (OUTPATIENT)
Dept: HOSPITAL 79 - CCU | Age: 47
Setting detail: OBSERVATION
Discharge: HOME | End: 2022-05-27
Attending: HOSPITALIST | Admitting: HOSPITALIST
Payer: MEDICARE

## 2022-05-27 VITALS — HEIGHT: 69 IN | WEIGHT: 305 LBS | BODY MASS INDEX: 45.18 KG/M2

## 2022-05-27 DIAGNOSIS — F17.210: ICD-10-CM

## 2022-05-27 DIAGNOSIS — N18.6: ICD-10-CM

## 2022-05-27 DIAGNOSIS — Z79.899: ICD-10-CM

## 2022-05-27 DIAGNOSIS — Z79.01: ICD-10-CM

## 2022-05-27 DIAGNOSIS — E66.01: ICD-10-CM

## 2022-05-27 DIAGNOSIS — G47.33: ICD-10-CM

## 2022-05-27 DIAGNOSIS — Z79.52: ICD-10-CM

## 2022-05-27 DIAGNOSIS — I13.2: ICD-10-CM

## 2022-05-27 DIAGNOSIS — F32.A: ICD-10-CM

## 2022-05-27 DIAGNOSIS — I48.0: ICD-10-CM

## 2022-05-27 DIAGNOSIS — I16.0: Primary | ICD-10-CM

## 2022-05-27 DIAGNOSIS — F41.9: ICD-10-CM

## 2022-05-27 DIAGNOSIS — Z79.82: ICD-10-CM

## 2022-05-27 DIAGNOSIS — F12.929: ICD-10-CM

## 2022-05-27 DIAGNOSIS — I50.32: ICD-10-CM

## 2022-05-27 DIAGNOSIS — N40.0: ICD-10-CM

## 2022-05-27 DIAGNOSIS — Z99.2: ICD-10-CM

## 2022-05-27 DIAGNOSIS — Z82.49: ICD-10-CM

## 2022-05-27 PROCEDURE — G0378 HOSPITAL OBSERVATION PER HR: HCPCS

## 2022-05-27 PROCEDURE — G0379 DIRECT REFER HOSPITAL OBSERV: HCPCS

## 2023-05-16 ENCOUNTER — HOSPITAL ENCOUNTER (INPATIENT)
Facility: HOSPITAL | Age: 48
LOS: 5 days | Discharge: OTHER INSTITUTION | End: 2023-05-22
Attending: STUDENT IN AN ORGANIZED HEALTH CARE EDUCATION/TRAINING PROGRAM | Admitting: INTERNAL MEDICINE
Payer: MEDICARE

## 2023-05-16 DIAGNOSIS — A41.9 SEPSIS, DUE TO UNSPECIFIED ORGANISM, UNSPECIFIED WHETHER ACUTE ORGAN DYSFUNCTION PRESENT: Primary | ICD-10-CM

## 2023-05-16 DIAGNOSIS — T82.7XXA INFECTION OF HEMODIALYSIS CATHETER, INITIAL ENCOUNTER: ICD-10-CM

## 2023-05-16 PROCEDURE — 99284 EMERGENCY DEPT VISIT MOD MDM: CPT

## 2023-05-16 PROCEDURE — 36415 COLL VENOUS BLD VENIPUNCTURE: CPT

## 2023-05-17 ENCOUNTER — APPOINTMENT (OUTPATIENT)
Dept: GENERAL RADIOLOGY | Facility: HOSPITAL | Age: 48
End: 2023-05-17
Payer: MEDICARE

## 2023-05-17 PROBLEM — A41.9 SEPSIS, DUE TO UNSPECIFIED ORGANISM, UNSPECIFIED WHETHER ACUTE ORGAN DYSFUNCTION PRESENT: Status: ACTIVE | Noted: 2023-05-17

## 2023-05-17 LAB
ALBUMIN SERPL-MCNC: 3.6 G/DL (ref 3.5–5.2)
ALBUMIN SERPL-MCNC: 3.7 G/DL (ref 3.5–5.2)
ALBUMIN/GLOB SERPL: 1.1 G/DL
ALBUMIN/GLOB SERPL: 1.2 G/DL
ALP SERPL-CCNC: 52 U/L (ref 39–117)
ALP SERPL-CCNC: 55 U/L (ref 39–117)
ALT SERPL W P-5'-P-CCNC: 6 U/L (ref 1–41)
ALT SERPL W P-5'-P-CCNC: 7 U/L (ref 1–41)
ANION GAP SERPL CALCULATED.3IONS-SCNC: 14.5 MMOL/L (ref 5–15)
ANION GAP SERPL CALCULATED.3IONS-SCNC: 15.5 MMOL/L (ref 5–15)
APTT PPP: 30.8 SECONDS (ref 26.5–34.5)
AST SERPL-CCNC: 13 U/L (ref 1–40)
AST SERPL-CCNC: 14 U/L (ref 1–40)
BACTERIA BLD CULT: ABNORMAL
BASOPHILS # BLD AUTO: 0.02 10*3/MM3 (ref 0–0.2)
BASOPHILS # BLD AUTO: 0.04 10*3/MM3 (ref 0–0.2)
BASOPHILS NFR BLD AUTO: 0.2 % (ref 0–1.5)
BASOPHILS NFR BLD AUTO: 0.3 % (ref 0–1.5)
BILIRUB SERPL-MCNC: 0.4 MG/DL (ref 0–1.2)
BILIRUB SERPL-MCNC: 0.4 MG/DL (ref 0–1.2)
BUN SERPL-MCNC: 58 MG/DL (ref 6–20)
BUN SERPL-MCNC: 59 MG/DL (ref 6–20)
BUN/CREAT SERPL: 5.4 (ref 7–25)
BUN/CREAT SERPL: 5.8 (ref 7–25)
CALCIUM SPEC-SCNC: 9.1 MG/DL (ref 8.6–10.5)
CALCIUM SPEC-SCNC: 9.2 MG/DL (ref 8.6–10.5)
CHLORIDE SERPL-SCNC: 98 MMOL/L (ref 98–107)
CHLORIDE SERPL-SCNC: 99 MMOL/L (ref 98–107)
CO2 SERPL-SCNC: 19.5 MMOL/L (ref 22–29)
CO2 SERPL-SCNC: 21.5 MMOL/L (ref 22–29)
CREAT SERPL-MCNC: 10.19 MG/DL (ref 0.76–1.27)
CREAT SERPL-MCNC: 10.65 MG/DL (ref 0.76–1.27)
CRP SERPL-MCNC: 11.81 MG/DL (ref 0–0.5)
D-LACTATE SERPL-SCNC: 0.7 MMOL/L (ref 0.5–2)
DEPRECATED RDW RBC AUTO: 59.2 FL (ref 37–54)
DEPRECATED RDW RBC AUTO: 59.6 FL (ref 37–54)
EGFRCR SERPLBLD CKD-EPI 2021: 5.4 ML/MIN/1.73
EGFRCR SERPLBLD CKD-EPI 2021: 5.7 ML/MIN/1.73
EOSINOPHIL # BLD AUTO: 0.08 10*3/MM3 (ref 0–0.4)
EOSINOPHIL # BLD AUTO: 0.1 10*3/MM3 (ref 0–0.4)
EOSINOPHIL NFR BLD AUTO: 0.6 % (ref 0.3–6.2)
EOSINOPHIL NFR BLD AUTO: 0.7 % (ref 0.3–6.2)
ERYTHROCYTE [DISTWIDTH] IN BLOOD BY AUTOMATED COUNT: 16.4 % (ref 12.3–15.4)
ERYTHROCYTE [DISTWIDTH] IN BLOOD BY AUTOMATED COUNT: 16.5 % (ref 12.3–15.4)
ERYTHROCYTE [SEDIMENTATION RATE] IN BLOOD: 26 MM/HR (ref 0–15)
FLUAV RNA RESP QL NAA+PROBE: NOT DETECTED
FLUBV RNA RESP QL NAA+PROBE: NOT DETECTED
GLOBULIN UR ELPH-MCNC: 3 GM/DL
GLOBULIN UR ELPH-MCNC: 3.3 GM/DL
GLUCOSE SERPL-MCNC: 70 MG/DL (ref 65–99)
GLUCOSE SERPL-MCNC: 83 MG/DL (ref 65–99)
HAV IGM SERPL QL IA: NORMAL
HBV CORE IGM SERPL QL IA: NORMAL
HBV SURFACE AG SERPL QL IA: NORMAL
HCT VFR BLD AUTO: 38.6 % (ref 37.5–51)
HCT VFR BLD AUTO: 40.4 % (ref 37.5–51)
HCV AB SER DONR QL: NORMAL
HGB BLD-MCNC: 12.2 G/DL (ref 13–17.7)
HGB BLD-MCNC: 12.8 G/DL (ref 13–17.7)
HOLD SPECIMEN: NORMAL
HOLD SPECIMEN: NORMAL
IMM GRANULOCYTES # BLD AUTO: 0.08 10*3/MM3 (ref 0–0.05)
IMM GRANULOCYTES # BLD AUTO: 0.09 10*3/MM3 (ref 0–0.05)
IMM GRANULOCYTES NFR BLD AUTO: 0.6 % (ref 0–0.5)
IMM GRANULOCYTES NFR BLD AUTO: 0.6 % (ref 0–0.5)
INR PPP: 1.17 (ref 0.9–1.1)
LYMPHOCYTES # BLD AUTO: 0.33 10*3/MM3 (ref 0.7–3.1)
LYMPHOCYTES # BLD AUTO: 0.41 10*3/MM3 (ref 0.7–3.1)
LYMPHOCYTES NFR BLD AUTO: 2.6 % (ref 19.6–45.3)
LYMPHOCYTES NFR BLD AUTO: 2.9 % (ref 19.6–45.3)
MCH RBC QN AUTO: 31.4 PG (ref 26.6–33)
MCH RBC QN AUTO: 31.5 PG (ref 26.6–33)
MCHC RBC AUTO-ENTMCNC: 31.6 G/DL (ref 31.5–35.7)
MCHC RBC AUTO-ENTMCNC: 31.7 G/DL (ref 31.5–35.7)
MCV RBC AUTO: 99.3 FL (ref 79–97)
MCV RBC AUTO: 99.7 FL (ref 79–97)
MONOCYTES # BLD AUTO: 0.78 10*3/MM3 (ref 0.1–0.9)
MONOCYTES # BLD AUTO: 1.27 10*3/MM3 (ref 0.1–0.9)
MONOCYTES NFR BLD AUTO: 6.1 % (ref 5–12)
MONOCYTES NFR BLD AUTO: 9 % (ref 5–12)
NEUTROPHILS NFR BLD AUTO: 11.52 10*3/MM3 (ref 1.7–7)
NEUTROPHILS NFR BLD AUTO: 12.18 10*3/MM3 (ref 1.7–7)
NEUTROPHILS NFR BLD AUTO: 86.5 % (ref 42.7–76)
NEUTROPHILS NFR BLD AUTO: 89.9 % (ref 42.7–76)
NRBC BLD AUTO-RTO: 0 /100 WBC (ref 0–0.2)
NRBC BLD AUTO-RTO: 0 /100 WBC (ref 0–0.2)
PLATELET # BLD AUTO: 150 10*3/MM3 (ref 140–450)
PLATELET # BLD AUTO: 155 10*3/MM3 (ref 140–450)
PMV BLD AUTO: 9.8 FL (ref 6–12)
PMV BLD AUTO: 9.8 FL (ref 6–12)
POTASSIUM SERPL-SCNC: 5.1 MMOL/L (ref 3.5–5.2)
POTASSIUM SERPL-SCNC: 5.2 MMOL/L (ref 3.5–5.2)
PROT SERPL-MCNC: 6.6 G/DL (ref 6–8.5)
PROT SERPL-MCNC: 7 G/DL (ref 6–8.5)
PROTHROMBIN TIME: 15.5 SECONDS (ref 12.1–14.7)
QT INTERVAL: 330 MS
QTC INTERVAL: 433 MS
RBC # BLD AUTO: 3.87 10*6/MM3 (ref 4.14–5.8)
RBC # BLD AUTO: 4.07 10*6/MM3 (ref 4.14–5.8)
SARS-COV-2 RNA RESP QL NAA+PROBE: NOT DETECTED
SODIUM SERPL-SCNC: 133 MMOL/L (ref 136–145)
SODIUM SERPL-SCNC: 135 MMOL/L (ref 136–145)
WBC NRBC COR # BLD: 12.81 10*3/MM3 (ref 3.4–10.8)
WBC NRBC COR # BLD: 14.09 10*3/MM3 (ref 3.4–10.8)
WHOLE BLOOD HOLD COAG: NORMAL
WHOLE BLOOD HOLD SPECIMEN: NORMAL

## 2023-05-17 PROCEDURE — 94799 UNLISTED PULMONARY SVC/PX: CPT

## 2023-05-17 PROCEDURE — 80053 COMPREHEN METABOLIC PANEL: CPT | Performed by: INTERNAL MEDICINE

## 2023-05-17 PROCEDURE — 85025 COMPLETE CBC W/AUTO DIFF WBC: CPT | Performed by: STUDENT IN AN ORGANIZED HEALTH CARE EDUCATION/TRAINING PROGRAM

## 2023-05-17 PROCEDURE — 87636 SARSCOV2 & INF A&B AMP PRB: CPT | Performed by: STUDENT IN AN ORGANIZED HEALTH CARE EDUCATION/TRAINING PROGRAM

## 2023-05-17 PROCEDURE — 87147 CULTURE TYPE IMMUNOLOGIC: CPT | Performed by: INTERNAL MEDICINE

## 2023-05-17 PROCEDURE — 87040 BLOOD CULTURE FOR BACTERIA: CPT | Performed by: INTERNAL MEDICINE

## 2023-05-17 PROCEDURE — 25010000002 VANCOMYCIN 5 G RECONSTITUTED SOLUTION: Performed by: STUDENT IN AN ORGANIZED HEALTH CARE EDUCATION/TRAINING PROGRAM

## 2023-05-17 PROCEDURE — 86140 C-REACTIVE PROTEIN: CPT | Performed by: STUDENT IN AN ORGANIZED HEALTH CARE EDUCATION/TRAINING PROGRAM

## 2023-05-17 PROCEDURE — 85652 RBC SED RATE AUTOMATED: CPT | Performed by: STUDENT IN AN ORGANIZED HEALTH CARE EDUCATION/TRAINING PROGRAM

## 2023-05-17 PROCEDURE — 25010000002 ONDANSETRON PER 1 MG: Performed by: INTERNAL MEDICINE

## 2023-05-17 PROCEDURE — 87147 CULTURE TYPE IMMUNOLOGIC: CPT | Performed by: STUDENT IN AN ORGANIZED HEALTH CARE EDUCATION/TRAINING PROGRAM

## 2023-05-17 PROCEDURE — 85610 PROTHROMBIN TIME: CPT | Performed by: STUDENT IN AN ORGANIZED HEALTH CARE EDUCATION/TRAINING PROGRAM

## 2023-05-17 PROCEDURE — 25010000002 CEFTAZIDIME PER 500 MG: Performed by: INTERNAL MEDICINE

## 2023-05-17 PROCEDURE — 93010 ELECTROCARDIOGRAM REPORT: CPT | Performed by: INTERNAL MEDICINE

## 2023-05-17 PROCEDURE — 87186 SC STD MICRODIL/AGAR DIL: CPT | Performed by: STUDENT IN AN ORGANIZED HEALTH CARE EDUCATION/TRAINING PROGRAM

## 2023-05-17 PROCEDURE — 80074 ACUTE HEPATITIS PANEL: CPT | Performed by: INTERNAL MEDICINE

## 2023-05-17 PROCEDURE — 85025 COMPLETE CBC W/AUTO DIFF WBC: CPT | Performed by: INTERNAL MEDICINE

## 2023-05-17 PROCEDURE — 99222 1ST HOSP IP/OBS MODERATE 55: CPT | Performed by: INTERNAL MEDICINE

## 2023-05-17 PROCEDURE — 94761 N-INVAS EAR/PLS OXIMETRY MLT: CPT

## 2023-05-17 PROCEDURE — 87040 BLOOD CULTURE FOR BACTERIA: CPT | Performed by: STUDENT IN AN ORGANIZED HEALTH CARE EDUCATION/TRAINING PROGRAM

## 2023-05-17 PROCEDURE — 83605 ASSAY OF LACTIC ACID: CPT | Performed by: STUDENT IN AN ORGANIZED HEALTH CARE EDUCATION/TRAINING PROGRAM

## 2023-05-17 PROCEDURE — 71046 X-RAY EXAM CHEST 2 VIEWS: CPT | Performed by: RADIOLOGY

## 2023-05-17 PROCEDURE — 93005 ELECTROCARDIOGRAM TRACING: CPT | Performed by: STUDENT IN AN ORGANIZED HEALTH CARE EDUCATION/TRAINING PROGRAM

## 2023-05-17 PROCEDURE — 85730 THROMBOPLASTIN TIME PARTIAL: CPT | Performed by: STUDENT IN AN ORGANIZED HEALTH CARE EDUCATION/TRAINING PROGRAM

## 2023-05-17 PROCEDURE — 87150 DNA/RNA AMPLIFIED PROBE: CPT | Performed by: STUDENT IN AN ORGANIZED HEALTH CARE EDUCATION/TRAINING PROGRAM

## 2023-05-17 PROCEDURE — 5A1D70Z PERFORMANCE OF URINARY FILTRATION, INTERMITTENT, LESS THAN 6 HOURS PER DAY: ICD-10-PCS | Performed by: SURGERY

## 2023-05-17 PROCEDURE — 80053 COMPREHEN METABOLIC PANEL: CPT | Performed by: STUDENT IN AN ORGANIZED HEALTH CARE EDUCATION/TRAINING PROGRAM

## 2023-05-17 PROCEDURE — 94640 AIRWAY INHALATION TREATMENT: CPT

## 2023-05-17 PROCEDURE — 71046 X-RAY EXAM CHEST 2 VIEWS: CPT

## 2023-05-17 RX ORDER — PANTOPRAZOLE SODIUM 40 MG/1
40 TABLET, DELAYED RELEASE ORAL DAILY
COMMUNITY

## 2023-05-17 RX ORDER — CLONIDINE HYDROCHLORIDE 0.1 MG/1
0.3 TABLET ORAL 3 TIMES DAILY
Status: CANCELLED | OUTPATIENT
Start: 2023-05-17

## 2023-05-17 RX ORDER — MONTELUKAST SODIUM 10 MG/1
10 TABLET ORAL NIGHTLY
Status: DISCONTINUED | OUTPATIENT
Start: 2023-05-17 | End: 2023-05-22 | Stop reason: HOSPADM

## 2023-05-17 RX ORDER — ALPRAZOLAM 0.5 MG/1
2 TABLET ORAL NIGHTLY
Status: CANCELLED | OUTPATIENT
Start: 2023-05-17

## 2023-05-17 RX ORDER — BUMETANIDE 1 MG/1
2 TABLET ORAL DAILY
Status: CANCELLED | OUTPATIENT
Start: 2023-05-17

## 2023-05-17 RX ORDER — ALBUTEROL SULFATE 2.5 MG/3ML
2.5 SOLUTION RESPIRATORY (INHALATION) EVERY 6 HOURS PRN
Status: CANCELLED | OUTPATIENT
Start: 2023-05-17

## 2023-05-17 RX ORDER — CARVEDILOL 25 MG/1
25 TABLET ORAL 2 TIMES DAILY WITH MEALS
Status: DISCONTINUED | OUTPATIENT
Start: 2023-05-17 | End: 2023-05-22 | Stop reason: HOSPADM

## 2023-05-17 RX ORDER — ROPINIROLE 0.25 MG/1
0.25 TABLET, FILM COATED ORAL 3 TIMES DAILY
Status: CANCELLED | OUTPATIENT
Start: 2023-05-17

## 2023-05-17 RX ORDER — ACETAMINOPHEN 325 MG/1
650 TABLET ORAL EVERY 6 HOURS PRN
Status: DISCONTINUED | OUTPATIENT
Start: 2023-05-17 | End: 2023-05-22 | Stop reason: HOSPADM

## 2023-05-17 RX ORDER — ALPRAZOLAM 1 MG/1
2 TABLET ORAL NIGHTLY
Status: DISCONTINUED | OUTPATIENT
Start: 2023-05-17 | End: 2023-05-22 | Stop reason: HOSPADM

## 2023-05-17 RX ORDER — BUMETANIDE 1 MG/1
2 TABLET ORAL DAILY
Status: DISCONTINUED | OUTPATIENT
Start: 2023-05-17 | End: 2023-05-22 | Stop reason: HOSPADM

## 2023-05-17 RX ORDER — NITROGLYCERIN 0.4 MG/1
0.4 TABLET SUBLINGUAL
Status: DISCONTINUED | OUTPATIENT
Start: 2023-05-17 | End: 2023-05-22 | Stop reason: HOSPADM

## 2023-05-17 RX ORDER — VANCOMYCIN 2 GRAM/500 ML IN 0.9 % SODIUM CHLORIDE INTRAVENOUS
2000 ONCE
Status: COMPLETED | OUTPATIENT
Start: 2023-05-17 | End: 2023-05-17

## 2023-05-17 RX ORDER — TAMSULOSIN HYDROCHLORIDE 0.4 MG/1
0.4 CAPSULE ORAL DAILY
Status: DISCONTINUED | OUTPATIENT
Start: 2023-05-17 | End: 2023-05-22 | Stop reason: HOSPADM

## 2023-05-17 RX ORDER — BUDESONIDE AND FORMOTEROL FUMARATE DIHYDRATE 160; 4.5 UG/1; UG/1
2 AEROSOL RESPIRATORY (INHALATION)
Status: CANCELLED | OUTPATIENT
Start: 2023-05-17

## 2023-05-17 RX ORDER — ATORVASTATIN CALCIUM 40 MG/1
40 TABLET, FILM COATED ORAL NIGHTLY
Status: CANCELLED | OUTPATIENT
Start: 2023-05-17

## 2023-05-17 RX ORDER — BUPROPION HYDROCHLORIDE 150 MG/1
300 TABLET ORAL EVERY MORNING
Status: CANCELLED | OUTPATIENT
Start: 2023-05-17

## 2023-05-17 RX ORDER — ONDANSETRON 2 MG/ML
4 INJECTION INTRAMUSCULAR; INTRAVENOUS EVERY 6 HOURS PRN
Status: DISCONTINUED | OUTPATIENT
Start: 2023-05-17 | End: 2023-05-22 | Stop reason: HOSPADM

## 2023-05-17 RX ORDER — BUDESONIDE AND FORMOTEROL FUMARATE DIHYDRATE 160; 4.5 UG/1; UG/1
2 AEROSOL RESPIRATORY (INHALATION)
Status: DISCONTINUED | OUTPATIENT
Start: 2023-05-17 | End: 2023-05-22 | Stop reason: HOSPADM

## 2023-05-17 RX ORDER — CARVEDILOL 25 MG/1
25 TABLET ORAL 2 TIMES DAILY WITH MEALS
COMMUNITY

## 2023-05-17 RX ORDER — HEPARIN SODIUM 5000 [USP'U]/ML
5000 INJECTION, SOLUTION INTRAVENOUS; SUBCUTANEOUS EVERY 12 HOURS SCHEDULED
Status: DISCONTINUED | OUTPATIENT
Start: 2023-05-17 | End: 2023-05-17

## 2023-05-17 RX ORDER — ATORVASTATIN CALCIUM 40 MG/1
40 TABLET, FILM COATED ORAL NIGHTLY
Status: DISCONTINUED | OUTPATIENT
Start: 2023-05-17 | End: 2023-05-22 | Stop reason: HOSPADM

## 2023-05-17 RX ORDER — PANTOPRAZOLE SODIUM 40 MG/1
40 TABLET, DELAYED RELEASE ORAL DAILY
Status: CANCELLED | OUTPATIENT
Start: 2023-05-17

## 2023-05-17 RX ORDER — BUPROPION HYDROCHLORIDE 150 MG/1
300 TABLET ORAL EVERY MORNING
Status: DISCONTINUED | OUTPATIENT
Start: 2023-05-17 | End: 2023-05-22 | Stop reason: HOSPADM

## 2023-05-17 RX ORDER — BUMETANIDE 2 MG/1
2 TABLET ORAL DAILY
COMMUNITY

## 2023-05-17 RX ORDER — METOLAZONE 10 MG/1
10 TABLET ORAL DAILY
COMMUNITY

## 2023-05-17 RX ORDER — SODIUM CHLORIDE 9 MG/ML
40 INJECTION, SOLUTION INTRAVENOUS AS NEEDED
Status: DISCONTINUED | OUTPATIENT
Start: 2023-05-17 | End: 2023-05-22 | Stop reason: HOSPADM

## 2023-05-17 RX ORDER — METOLAZONE 2.5 MG/1
10 TABLET ORAL DAILY
Status: CANCELLED | OUTPATIENT
Start: 2023-05-17

## 2023-05-17 RX ORDER — ALBUTEROL SULFATE 90 UG/1
2 AEROSOL, METERED RESPIRATORY (INHALATION) EVERY 4 HOURS PRN
COMMUNITY

## 2023-05-17 RX ORDER — METOLAZONE 2.5 MG/1
10 TABLET ORAL DAILY
Status: DISCONTINUED | OUTPATIENT
Start: 2023-05-17 | End: 2023-05-22 | Stop reason: HOSPADM

## 2023-05-17 RX ORDER — SODIUM CHLORIDE 0.9 % (FLUSH) 0.9 %
10 SYRINGE (ML) INJECTION EVERY 12 HOURS SCHEDULED
Status: DISCONTINUED | OUTPATIENT
Start: 2023-05-17 | End: 2023-05-22 | Stop reason: HOSPADM

## 2023-05-17 RX ORDER — ROPINIROLE 0.25 MG/1
0.25 TABLET, FILM COATED ORAL 3 TIMES DAILY
Status: DISCONTINUED | OUTPATIENT
Start: 2023-05-17 | End: 2023-05-22 | Stop reason: HOSPADM

## 2023-05-17 RX ORDER — EZETIMIBE 10 MG/1
10 TABLET ORAL DAILY
Status: CANCELLED | OUTPATIENT
Start: 2023-05-17

## 2023-05-17 RX ORDER — ROPINIROLE 0.25 MG/1
0.25 TABLET, FILM COATED ORAL 3 TIMES DAILY
COMMUNITY

## 2023-05-17 RX ORDER — MONTELUKAST SODIUM 10 MG/1
10 TABLET ORAL NIGHTLY
Status: CANCELLED | OUTPATIENT
Start: 2023-05-17

## 2023-05-17 RX ORDER — CLONIDINE HYDROCHLORIDE 0.3 MG/1
0.3 TABLET ORAL 3 TIMES DAILY
Status: DISCONTINUED | OUTPATIENT
Start: 2023-05-17 | End: 2023-05-22 | Stop reason: HOSPADM

## 2023-05-17 RX ORDER — AMLODIPINE BESYLATE 10 MG/1
10 TABLET ORAL DAILY
Status: DISCONTINUED | OUTPATIENT
Start: 2023-05-17 | End: 2023-05-22 | Stop reason: HOSPADM

## 2023-05-17 RX ORDER — ONDANSETRON 4 MG/1
8 TABLET, FILM COATED ORAL EVERY 8 HOURS PRN
Status: CANCELLED | OUTPATIENT
Start: 2023-05-17

## 2023-05-17 RX ORDER — AMLODIPINE BESYLATE 5 MG/1
10 TABLET ORAL DAILY
Status: CANCELLED | OUTPATIENT
Start: 2023-05-17

## 2023-05-17 RX ORDER — ALPRAZOLAM 1 MG/1
2 TABLET ORAL NIGHTLY
Status: CANCELLED | OUTPATIENT
Start: 2023-05-17

## 2023-05-17 RX ORDER — SODIUM CHLORIDE 0.9 % (FLUSH) 0.9 %
10 SYRINGE (ML) INJECTION AS NEEDED
Status: DISCONTINUED | OUTPATIENT
Start: 2023-05-17 | End: 2023-05-22 | Stop reason: HOSPADM

## 2023-05-17 RX ORDER — BUDESONIDE AND FORMOTEROL FUMARATE DIHYDRATE 160; 4.5 UG/1; UG/1
2 AEROSOL RESPIRATORY (INHALATION)
COMMUNITY

## 2023-05-17 RX ORDER — ISOSORBIDE MONONITRATE 30 MG/1
120 TABLET, EXTENDED RELEASE ORAL DAILY
Status: CANCELLED | OUTPATIENT
Start: 2023-05-17

## 2023-05-17 RX ORDER — TRAMADOL HYDROCHLORIDE 50 MG/1
50 TABLET ORAL EVERY 12 HOURS PRN
Status: DISCONTINUED | OUTPATIENT
Start: 2023-05-17 | End: 2023-05-22 | Stop reason: HOSPADM

## 2023-05-17 RX ORDER — PANTOPRAZOLE SODIUM 40 MG/1
40 TABLET, DELAYED RELEASE ORAL DAILY
Status: DISCONTINUED | OUTPATIENT
Start: 2023-05-17 | End: 2023-05-22 | Stop reason: HOSPADM

## 2023-05-17 RX ORDER — ALBUTEROL SULFATE 2.5 MG/3ML
2.5 SOLUTION RESPIRATORY (INHALATION) EVERY 4 HOURS PRN
Status: DISCONTINUED | OUTPATIENT
Start: 2023-05-17 | End: 2023-05-21

## 2023-05-17 RX ORDER — CARVEDILOL 25 MG/1
25 TABLET ORAL 2 TIMES DAILY WITH MEALS
Status: CANCELLED | OUTPATIENT
Start: 2023-05-17

## 2023-05-17 RX ORDER — ISOSORBIDE MONONITRATE 30 MG/1
120 TABLET, EXTENDED RELEASE ORAL DAILY
Status: DISCONTINUED | OUTPATIENT
Start: 2023-05-17 | End: 2023-05-22 | Stop reason: HOSPADM

## 2023-05-17 RX ORDER — TAMSULOSIN HYDROCHLORIDE 0.4 MG/1
0.4 CAPSULE ORAL DAILY
Status: CANCELLED | OUTPATIENT
Start: 2023-05-17

## 2023-05-17 RX ORDER — ONDANSETRON HYDROCHLORIDE 8 MG/1
8 TABLET, FILM COATED ORAL EVERY 8 HOURS PRN
COMMUNITY

## 2023-05-17 RX ORDER — ONDANSETRON 4 MG/1
4 TABLET, ORALLY DISINTEGRATING ORAL EVERY 6 HOURS PRN
Status: DISCONTINUED | OUTPATIENT
Start: 2023-05-17 | End: 2023-05-17

## 2023-05-17 RX ADMIN — Medication 10 ML: at 20:50

## 2023-05-17 RX ADMIN — Medication: at 16:59

## 2023-05-17 RX ADMIN — TRAMADOL HYDROCHLORIDE 50 MG: 50 TABLET, COATED ORAL at 11:30

## 2023-05-17 RX ADMIN — ROPINIROLE HYDROCHLORIDE 0.25 MG: 0.25 TABLET, FILM COATED ORAL at 18:03

## 2023-05-17 RX ADMIN — MONTELUKAST SODIUM 10 MG: 10 TABLET, COATED ORAL at 20:50

## 2023-05-17 RX ADMIN — PANTOPRAZOLE SODIUM 40 MG: 40 TABLET, DELAYED RELEASE ORAL at 09:38

## 2023-05-17 RX ADMIN — CEFTAZIDIME 1 G: 1 INJECTION, POWDER, FOR SOLUTION INTRAMUSCULAR; INTRAVENOUS at 09:37

## 2023-05-17 RX ADMIN — ALPRAZOLAM 2 MG: 1 TABLET ORAL at 20:53

## 2023-05-17 RX ADMIN — AMLODIPINE BESYLATE 10 MG: 10 TABLET ORAL at 18:03

## 2023-05-17 RX ADMIN — ACETAMINOPHEN 650 MG: 325 TABLET ORAL at 04:21

## 2023-05-17 RX ADMIN — BUMETANIDE 2 MG: 1 TABLET ORAL at 18:03

## 2023-05-17 RX ADMIN — Medication 10 ML: at 09:38

## 2023-05-17 RX ADMIN — BUDESONIDE AND FORMOTEROL FUMARATE DIHYDRATE 2 PUFF: 160; 4.5 AEROSOL RESPIRATORY (INHALATION) at 18:23

## 2023-05-17 RX ADMIN — ISOSORBIDE MONONITRATE 120 MG: 30 TABLET, EXTENDED RELEASE ORAL at 18:03

## 2023-05-17 RX ADMIN — BUPROPION HYDROCHLORIDE 300 MG: 150 TABLET, EXTENDED RELEASE ORAL at 09:38

## 2023-05-17 RX ADMIN — ATORVASTATIN CALCIUM 40 MG: 40 TABLET, FILM COATED ORAL at 20:50

## 2023-05-17 RX ADMIN — ROPINIROLE HYDROCHLORIDE 0.25 MG: 0.25 TABLET, FILM COATED ORAL at 09:38

## 2023-05-17 RX ADMIN — ONDANSETRON 4 MG: 2 INJECTION INTRAMUSCULAR; INTRAVENOUS at 13:09

## 2023-05-17 RX ADMIN — METOLAZONE 10 MG: 2.5 TABLET ORAL at 18:03

## 2023-05-17 RX ADMIN — CLONIDINE HYDROCHLORIDE 0.3 MG: 0.3 TABLET ORAL at 18:03

## 2023-05-17 RX ADMIN — CARVEDILOL 25 MG: 25 TABLET, FILM COATED ORAL at 18:03

## 2023-05-17 RX ADMIN — VANCOMYCIN HYDROCHLORIDE 2000 MG: 5 INJECTION, POWDER, LYOPHILIZED, FOR SOLUTION INTRAVENOUS at 02:38

## 2023-05-17 RX ADMIN — TAMSULOSIN HYDROCHLORIDE 0.4 MG: 0.4 CAPSULE ORAL at 09:38

## 2023-05-17 RX ADMIN — BUDESONIDE AND FORMOTEROL FUMARATE DIHYDRATE 2 PUFF: 160; 4.5 AEROSOL RESPIRATORY (INHALATION) at 06:36

## 2023-05-17 RX ADMIN — ROPINIROLE HYDROCHLORIDE 0.25 MG: 0.25 TABLET, FILM COATED ORAL at 20:50

## 2023-05-17 NOTE — PAYOR COMM NOTE
"CONTACT:  Nicole Jules RN    Utilization Management Dept.   [facility]  [address] [place] [address]    Phone:[phone]  Fax: [phone]    REQUEST FOR INPATIENT AUTHORIZATION  REF # [id number]  ICD 10: A41.9  ATTENDING MD:Geovanny Mosquera   NPI: [id number]  FACILITY NPI: [id number]  ADM DATE: 5/17/23      Elisha Keene \"[place]\" (48 y.o. Male)     Date of Birth   1975    Social Security Number       Address   [address] [place], [place] [address]    Home Phone   [phone]    MRN   [id number]       [place]   [facility]    Marital Status   Single                            Admission Date   5/16/23    Admission Type   Emergency    Admitting Provider   Natali Auguste DO    Attending Provider   Geovanny Mosquera Jr., MD    Department, Room/Bed   [address], [facility]4/       Discharge Date       Discharge Disposition       Discharge Destination                               Attending Provider: Goevanny Mosquera Jr., MD    Allergies: No Known Allergies    Isolation: None   Infection: None   Code Status: CPR    Ht: 175.3 cm (69\")   Wt: 120 kg (263 lb 10.7 oz)    Admission Cmt: None   Principal Problem: Sepsis, due to unspecified organism, unspecified whether acute organ dysfunction present [A41.9]                 Active Insurance as of 5/16/2023     Primary Coverage     Payor Plan Insurance Group Employer/Plan Group    ANTHEM MEDICARE REPLACEMENT ANTHEM MEDICARE ADVANTAGE [id number]     Payor Plan Address Payor Plan Phone Number Payor Plan Fax Number Effective Dates    [address] [phone]  1/1/2020 - None Entered    [facility] [id number]       Subscriber Name Subscriber Birth Date Member ID       ELISHA KEENE 1975 [id number]           Secondary Coverage     Payor Plan Insurance Group Employer/Plan Group    [employer] [place] WELL[place]ARE MEDICAID      Payor Plan Address Payor Plan Phone Number Payor Plan Fax Number Effective Dates    [address] [address] " [phone]  2020 - None Entered    [facility] [id number]       Subscriber Name Subscriber Birth Date Member ID       ELISHA KEENE 1975 [id number]                 Emergency Contacts      (Rel.) Home Phone Work Phone Mobile Phone    HUAN LUI (Other) [phone] -- --    holland caruso (Mother) -- -- [phone]    Kit Keene (Father) [phone] -- --               History & Physical      Sylvia Gill PA-C at 23 0248     Attestation signed by Natali Auguste DO at 23 2837    I have reviewed this documentation and agree. Patient independently seen and examined at bedside. Patient resting; only complaint is feeling hot/wanting temperature turned down. No nausea or vomiting at present.     Left chest with port in place; Left TDC with some serosanguinous drainage; no appreciable warmth or erythema.     Sepsis, POA, concern for catheter related blood stream infection  ESRD on HD //  HTN  PAF on chronic a/c with eliquis    Admit to telemetry. Blood cultures ordered in the ED.  Patient will be started on pharmacy to dose vancomycin and pharmacy to dose ceftazidime while pending final blood culture results.  Nephrology consulted and will defer to them regarding removal of patient's tunneled dialysis catheter; may need to consult general surgery upon nephrology recommendations.  Patient has a left upper extremity fistula that has not yet matured.  We will hold patient's Eliquis for now pending nephrology recommendations.  If bacteremia confirmed then would order echocardiogram and consider infectious disease consultation.                       [facility] Medicine Services  HISTORY & PHYSICAL    Patient Identification:  Name:  Elisha Keene  Age:  48 y.o.  Sex:  male  :  1975  MRN:  [id number]   Visit Number:  [id number]  Admit Date: 2023   Primary Care Physician:  Marah Bain APRN     Subjective     Chief complaint:   Chief  Complaint   Patient presents with   • Vascular Access Problem     History of presenting illness:   Patient is a 48 y.o. male with past medical history significant for hypertension, COPD/asthma, CAD, CHF, arthritis, KATHLEEN, morbid obesity, paroxysmal atrial fibrillation chronically anticoagulated, ESRD on dialysis that presented to the [facility] emergency department for evaluation of concerns of an infected dialysis catheter.  States he had a tunneled dialysis catheter placed at [facility] by Dr. Bennett on 4/22/2023.  Patient states he has had pain around the port site, dizziness, nausea, vomiting, chills, decreased intake.  Patient states he went to [place] ED yesterday and they recommended follow-up at dialysis clinic.  Patient went to dialysis clinic where they performed dialysis but did not further get investigate.  Patient gets dialyzed every Tuesday Thursday and Saturday.  Patient called to follow-up with his vascular surgeon Dr. Bennett but did not hear back therefore he came to the ED today for evaluation.  On my exam patient resting comfortably in bed.  Patient states he began noticing the area around his port, painful approximately 2 days ago.  Patient also notes that it has been draining serosanguineous fluid and pus around the insertion point.  He states it has been enough to saturate the pad around the base of the port.  Patient states he has had a port get infected in the past and this is similar to what happened then.  He states he was hospitalized in [place] at that time.  Patient states he has also felt feverish and chilled, but has not taken his temperature.  Patient also complains of fatigue and nausea.  Patient states he was hospitalized at [place] around 1 month ago.    Upon arrival to the ED, vitals were temperature 98.8, HR 66, RR 19, /71, SPO2 98% on room air.  Labs significant for creatinine 10.65, BUN 58, BUN/creatinine ratio 5.4, GFR 5.4.  WBC 14.09  neutrophil % 86.5, absolute neutrophils 12.18, sed rate 26, CRP 11.81.      Chest x-ray shows mildly enlarged heart size.  No lobar consolidation or edema.  No pleural effusion or pneumothorax.  Left neck central vascular catheter with tip in SVC/RA junction.  Left anterior chest port with catheter tip into the SVC.  No features to suggest mass.  No air in soft tissues.    Patient has been admitted to the telemetry unit.  ---------------------------------------------------------------------------------------------------------------------   Review of Systems   Constitutional: Positive for chills and fatigue. Negative for diaphoresis and fever.   Respiratory: Negative for cough, shortness of breath and wheezing.    Cardiovascular: Negative for chest pain, palpitations and leg swelling.   Gastrointestinal: Negative for abdominal pain, constipation, diarrhea, nausea and vomiting.   Genitourinary: Negative for flank pain and hematuria.   Musculoskeletal: Negative for arthralgias, myalgias and neck stiffness.   Skin: Negative for rash and wound.        Erythema and purulent drainage around port site   Neurological: Negative for dizziness, weakness and light-headedness.   Psychiatric/Behavioral: Negative for agitation and confusion.      ---------------------------------------------------------------------------------------------------------------------   Past Medical History:   Diagnosis Date   • Arthritis    • Asthma    • CHF (congestive heart failure)    • COPD (chronic obstructive pulmonary disease)    • Coronary artery disease    • Hypertension    • Sleep apnea      Past Surgical History:   Procedure Laterality Date   • CARDIAC CATHETERIZATION  2008 2006   • HERNIA REPAIR  1982     Family History   Problem Relation Age of Onset   • Hypertension Mother    • Hypertension Father    • Hypertension Sister    • Heart disease Sister      Social History     Socioeconomic History   • Marital status: Single   Tobacco Use   •  Smoking status: Former     Packs/day: 0.25     Types: Cigarettes     Quit date: 2014     Years since quittin.7     Passive exposure: Never   • Smokeless tobacco: Never   Vaping Use   • Vaping Use: Never used   Substance and Sexual Activity   • Alcohol use: Never   • Drug use: Yes     Frequency: 7.0 times per week     Types: Marijuana     Comment: tobacco free   • Sexual activity: Yes     Partners: Female     ---------------------------------------------------------------------------------------------------------------------   Allergies:  Patient has no known allergies.  ---------------------------------------------------------------------------------------------------------------------   Medications below are reported home medications pulling from within the system; at this time, these medications have not been reconciled unless otherwise specified and are in the verification process for further verifcation as current home medications.    Prior to Admission Medications     Prescriptions Last Dose Informant Patient Reported? Taking?    albuterol sulfate  (90 Base) MCG/ACT inhaler 2023 Self, Other Yes Yes    Inhale 2 puffs Every 4 (Four) Hours As Needed for Wheezing.    ALPRAZolam (XANAX) 1 MG tablet 2023 Self, Other Yes Yes    Take 2 tablets by mouth Every Night.    amLODIPine (NORVASC) 10 MG tablet 2023 Self, Other Yes Yes    Take 1 tablet by mouth Daily.    apixaban (ELIQUIS) 5 MG tablet tablet 2023 Self, Other Yes Yes    Take 1 tablet by mouth 2 (Two) Times a Day.    atorvastatin (LIPITOR) 40 MG tablet 2023 Self, Other Yes Yes    Take 1 tablet by mouth Every Night.    budesonide-formoterol (SYMBICORT) 160-4.5 MCG/ACT inhaler 2023 Self, Other Yes Yes    Inhale 2 puffs 2 (Two) Times a Day.    bumetanide (BUMEX) 2 MG tablet 2023 Self, Other Yes Yes    Take 1 tablet by mouth Daily.    buPROPion XL (WELLBUTRIN XL) 300 MG 24 hr tablet 2023 Self, Other Yes Yes     Take 1 tablet by mouth Every Morning.    carvedilol (COREG) 25 MG tablet 5/16/2023 Self, Other Yes Yes    Take 1 tablet by mouth 2 (Two) Times a Day With Meals.    cloNIDine (CATAPRES) 0.3 MG tablet 5/16/2023 Self, Other Yes Yes    Take 1 tablet by mouth 3 (Three) Times a Day.    ezetimibe (ZETIA) 10 MG tablet 5/16/2023 Self, Other Yes Yes    Take 1 tablet by mouth Daily.    isosorbide mononitrate (IMDUR) 120 MG 24 hr tablet 5/16/2023 Self, Other Yes Yes    Take 1 tablet by mouth Daily.    linaclotide (LINZESS) 145 MCG capsule capsule 5/16/2023 Self, Other Yes Yes    Take 1 capsule by mouth Every Morning Before Breakfast.    metOLazone (ZAROXOLYN) 10 MG tablet 5/16/2023 Self, Other Yes Yes    Take 1 tablet by mouth Daily.    montelukast (SINGULAIR) 10 MG tablet 5/16/2023 Self, Other Yes Yes    Take 1 tablet by mouth Every Night.    ondansetron (ZOFRAN) 8 MG tablet 5/16/2023 Self, Other Yes Yes    Take 1 tablet by mouth Every 8 (Eight) Hours As Needed for Nausea or Vomiting.    pantoprazole (PROTONIX) 40 MG EC tablet 5/16/2023 Self, Other Yes Yes    Take 1 tablet by mouth Daily.    rOPINIRole (REQUIP) 0.25 MG tablet 5/16/2023 Self, Other Yes Yes    Take 1 tablet by mouth 3 (Three) Times a Day.    tamsulosin (FLOMAX) 0.4 MG capsule 24 hr capsule 5/16/2023 Self, Other Yes Yes    Take 1 capsule by mouth Daily.        ---------------------------------------------------------------------------------------------------------------------    Objective     Hospital Scheduled Meds:  heparin (porcine), 5,000 Units, Subcutaneous, Q12H  sodium chloride, 10 mL, Intravenous, Q12H  vancomycin, 2,000 mg, Intravenous, Once  Vancomycin Pharmacy Intermittent/Pulse Dosing, , Does not apply, Daily           Current listed hospital scheduled medications may not yet reflect those currently placed in orders that are signed and held, awaiting patient's arrival to floor/unit.     ---------------------------------------------------------------------------------------------------------------------   Vital Signs:  Temp:  [98.3 °F (36.8 °C)-98.8 °F (37.1 °C)] 98.3 °F (36.8 °C)  Heart Rate:  [66-99] 99  Resp:  [18-19] 18  BP: (135-140)/(71-89) 140/89  Mean Arterial Pressure (Non-Invasive) for the past 24 hrs (Last 3 readings):   Noninvasive MAP (mmHg)   05/17/23 0318 104     SpO2 Percentage    05/16/23 2321 05/17/23 0305 05/17/23 0318   SpO2: 98% 97% 98%     SpO2:  [97 %-98 %] 98 %  on   ;   Device (Oxygen Therapy): room air    Body mass index is 38.94 kg/m².  Wt Readings from Last 3 Encounters:   05/17/23 120 kg (263 lb 10.7 oz)   12/21/21 (!) 148 kg (326 lb)   08/04/20 (!) 166 kg (366 lb 11.2 oz)     ---------------------------------------------------------------------------------------------------------------------   Physical Exam:  Physical Exam  Constitutional:       General: He is not in acute distress.     Appearance: Normal appearance.   HENT:      Head: Normocephalic and atraumatic.      Right Ear: External ear normal.      Left Ear: External ear normal.      Nose: No nasal deformity.      Mouth/Throat:      Lips: Pink.      Mouth: Mucous membranes are moist.   Eyes:      Conjunctiva/sclera: Conjunctivae normal.      Pupils: Pupils are equal, round, and reactive to light.   Cardiovascular:      Rate and Rhythm: Normal rate and regular rhythm.      Pulses:           Dorsalis pedis pulses are 2+ on the right side and 2+ on the left side.      Heart sounds: Normal heart sounds.   Pulmonary:      Effort: Pulmonary effort is normal.      Breath sounds: Normal breath sounds. No wheezing, rhonchi or rales.   Abdominal:      General: Abdomen is flat. Bowel sounds are normal.      Palpations: Abdomen is soft.      Tenderness: There is no guarding or rebound.   Genitourinary:     Comments: No fajardo catheter in place   Musculoskeletal:      Cervical back: Neck supple. Normal range of motion.       Right lower leg: No edema.      Left lower leg: No edema.   Skin:     General: Skin is warm and dry.      Comments: Warmth around port site, maybe slight erythema but hard to observe due to deep skin tone. No active drainage around port site   Neurological:      General: No focal deficit present.      Mental Status: He is alert and oriented to person, place, and time.   Psychiatric:         Mood and Affect: Mood normal.         Behavior: Behavior normal.       ---------------------------------------------------------------------------------------------------------------------  EKG: EKG shows sinus tachycardia.  Awaiting confirmation by cardiology.      Telemetry:    Patient not yet on telemetry.      I have personally reviewed the EKG/Telemetry strip  ---------------------------------------------------------------------------------------------------------------------             Results from last 7 days   Lab Units 05/17/23  0316 05/17/23 0053   CRP mg/dL  --  11.81*   LACTATE mmol/L  --  0.7   WBC 10*3/mm3 12.81* 14.09*   HEMOGLOBIN g/dL 12.8* 12.2*   HEMATOCRIT % 40.4 38.6   MCV fL 99.3* 99.7*   MCHC g/dL 31.7 31.6   PLATELETS 10*3/mm3 155 150   INR   --  1.17*     Results from last 7 days   Lab Units 05/17/23 0053   SODIUM mmol/L 135*   POTASSIUM mmol/L 5.1   CHLORIDE mmol/L 99   CO2 mmol/L 21.5*   BUN mg/dL 58*   CREATININE mg/dL 10.65*   CALCIUM mg/dL 9.1   GLUCOSE mg/dL 83   ALBUMIN g/dL 3.6   BILIRUBIN mg/dL 0.4   ALK PHOS U/L 52   AST (SGOT) U/L 14   ALT (SGPT) U/L 6   Estimated Creatinine Clearance: 10.8 mL/min (A) (by C-G formula based on SCr of 10.65 mg/dL (H)).  No results found for: AMMONIA    No results found for: HGBA1C, POCGLU  Lab Results   Component Value Date    HGBA1C 5.60 08/01/2020     Lab Results   Component Value Date    TSH 1.670 08/01/2020    FREET4 1.05 12/21/2015       No results found for: BLOODCX  No results found for: URINECX  No results found for: WOUNDCX  No results found for:  STOOLCX  No results found for: RESPCX  No results found for: MRSACX  Pain Management Panel         Latest Ref Rng & Units 12/21/2021 12/19/2021   Pain Management Panel   Creatinine, Urine mg/dL 48.3   64.1                I have personally reviewed the above laboratory results.   ---------------------------------------------------------------------------------------------------------------------  Imaging Results (Last 7 Days)     Procedure Component Value Units Date/Time    XR Chest 2 View [[id number]] Collected: 05/17/23 0246     Updated: 05/17/23 0250    Narrative:      Procedure: X-ray examination of the chest performed on 05/17/2023. 2  views.     HISTORY: Infection in the neck. Evaluate chest.     FINDINGS:     Mildly enlarged heart size.  Left neck central vascular catheter with tip to the SVC/RA junction.  Left anterior chest port with catheter tip in the SVC. Lower  consolidation or edema.  No pleural effusion or pneumothorax.  Mild hypoinflated lungs.  No fracture or dislocation.       Impression:         1.  Mildly enlarged heart size.  2.  No lobar consolidation or edema.  3.  No pleural effusion or pneumothorax.  4.  Left neck central vascular catheter with tip to the SVC/RA junction.  5.  Left anterior chest port with catheter tip into the SVC.  6.  No features to suggest mass.  7.  No air in the soft tissues.     This report was finalized on 5/17/2023 2:48 AM by Shorty Wei MD.           I have personally reviewed the above radiology results.     Last Echocardiogram:  Results for orders placed during the hospital encounter of 12/17/21    Adult Transthoracic Echo Complete w/ Color, Spectral and Contrast if necessary per protocol    Interpretation Summary  · Left ventricular wall thickness is consistent with severe concentric hypertrophy.  · Left ventricular ejection fraction appears to be 61 - 65%. Left ventricular systolic function is normal.  · Left ventricular diastolic function is consistent with  (grade II w/high LAP) pseudonormalization.  · Moderate to severe mitral valve regurgitation is present with an eccentric jet noted.    ---------------------------------------------------------------------------------------------------------------------    Assessment & Plan      Active Hospital Problems    Diagnosis  POA   • **Sepsis, due to unspecified organism, unspecified whether acute organ dysfunction present [A41.9]  Yes     · Sepsis likely secondary to cellulitis surrounding dialysis port, POA  · ESRD on dialysis, POA  · Patient does meet sepsis criteria at this time. Continue to monitor vital signs. HR >90 WBC >12, and symptoms of infection surrounding dialysis port  · Patient reports serosanguineous and purulent material draining around insertion site of port.  Also notes redness and warmth around port.   · Patient given IV vancomycin in the ED  · Blood cultures obtained in the ED  · Continue IV vancomycin   · Can obtain wound culture if site begins to actively drain on admission  · Recheck labs in the am  · Continue to monitor vital signs closely  · Can consult nephrology to facilitate hemodialysis while admitted   CHRONIC MEDICAL PROBLEMS    Essential hypertension  BP appears well controlled   Plan to resume home antihypertensive regimen once reconciled per pharmacy with appropriate holding parameters to prevent hypotension and/or bradycardia   Closely monitor BP per hospital protocol, titrate medications as necessary  · COPD/asthma  · CHF  · CAD  · KATHLEEN  · Paroxysmal atrial fibrillation chronically anticoagulated on Eliquis  · Morbid obesity  · Arthritis  · Restart home medications per med rec  · Supportive care    · F/E/N: No IV fluids at this time.  Continue to monitor electrolytes and replace as indicated.  Renal diet.    ---------------------------------------------------  DVT Prophylaxis: Home anticoagulation  GI Prophylaxis: Restart home PPI  Activity: Up with assistance    The patient is considered  to be a high risk patient due to: ESRD on HD, sepsis Madyson secondary to cellulitis around port    INPATIENT status due to the need for care which can only be reasonably provided in an hospital setting such as aggressive/expedited ancillary services and/or consultation services, the necessity for IV medications, close physician monitoring and/or the possible need for procedures.  In such, I feel patient’s risk for adverse outcomes and need for care warrant INPATIENT evaluation and predict the patient’s care encounter to likely last beyond 2 midnights.      Code Status: Full code    Disposition/Discharge planning: Pending clinical course    I have discussed the patient's assessment and plan with Dr. Auguste.      Sylvia Gill PA-C  Hospitalist Service -- [facility]       05/17/23  03:47 EDT    Attending Physician: Natali Auguste DO        Electronically signed by Natali Auguste DO at 05/17/23 0659          Emergency Department Notes      Jaquan Casas MD at 05/17/23 0002            [facility]  emergency department encounter        Pt Name: Sduarshan Keene  MRN: [id number]  Birthdate 1975  Date of evaluation: 5/17/2023    Chief Complaint   Patient presents with   • Vascular Access Problem             HISTORY OF PRESENT ILLNESS:   Sudarshan Keene is a 48 y.o. male PMH HTN, COPD/asthma, CAD, CHF, arthritis, KATHLEEN, morbid obesity, paroxysmal atrial fibrillation chronically anticoagulated, ESRD on dialysis.  Dialyzed Tuesday Thursday Saturday, last dialyzed yesterday.    Patient had tunneled dialysis catheter placed at [facility] by Dr. Laws on 4/22/23.    Patient presents with predominant concern for infected catheter in the left upper chest wall.  Patient reports symptom onset yesterday including pain around the port site, dizziness, nausea, vomiting, chills, decreased p.o. intake.  Patient was seen at [place] ED yesterday, they recommended he follow-up at dialysis  clinic.  Patient went to dialysis clinic where they perform dialysis but did not further investigate.  Patient called to follow-up with his vascular surgeon Dr. Laws, but did not hear back prompting patient to come here for evaluation.    Has fistula in the left antecubital that is few more weeks to maturity.  PCP: Marah Bain APRN          REVIEW OF SYSTEMS:     Review of Systems   Constitutional: Positive for appetite change and chills. Negative for fever.   HENT: Negative for congestion and rhinorrhea.    Eyes: Negative for visual disturbance.   Respiratory: Negative for cough and shortness of breath.    Cardiovascular: Negative for chest pain.   Gastrointestinal: Positive for nausea and vomiting. Negative for abdominal pain.   Genitourinary: Negative for dysuria.   Musculoskeletal: Negative for myalgias.   Skin: Negative for rash.   Neurological: Positive for dizziness. Negative for headaches.   Psychiatric/Behavioral: Negative for confusion.       Review of systems otherwise as per HPI.          PREVIOUS HISTORY:         Past Medical History:   Diagnosis Date   • Arthritis    • Asthma    • CHF (congestive heart failure)    • COPD (chronic obstructive pulmonary disease)    • Coronary artery disease    • Hypertension    • Sleep apnea            Past Surgical History:   Procedure Laterality Date   • CARDIAC CATHETERIZATION  2008   • HERNIA REPAIR             Social History     Socioeconomic History   • Marital status: Single   Tobacco Use   • Smoking status: Former     Packs/day: 0.25     Types: Cigarettes     Quit date: 2014     Years since quittin.7   • Smokeless tobacco: Never   Substance and Sexual Activity   • Alcohol use: Never   • Drug use: Yes     Frequency: 7.0 times per week     Types: Marijuana     Comment: tobacco free   • Sexual activity: Yes     Partners: Female           Family History   Problem Relation Age of Onset   • Hypertension Mother    • Hypertension  "Father    • Hypertension Sister    • Heart disease Sister          Current Outpatient Medications   Medication Instructions   • ALPRAZolam (XANAX) 1 mg, Oral, Nightly   • amLODIPine (NORVASC) 10 mg, Oral, Daily   • apixaban (ELIQUIS) 5 mg, Oral, 2 Times Daily   • atorvastatin (LIPITOR) 40 mg, Oral, Nightly   • bumetanide (BUMEX) 1 mg, Oral, Daily   • buPROPion XL (WELLBUTRIN XL) 300 mg, Oral, Every Morning   • cloNIDine (CATAPRES) 0.3 mg, Oral, 3 Times Daily   • ezetimibe (ZETIA) 10 mg, Oral, Daily   • ferrous sulfate 325 mg, Oral, Daily With Breakfast   • isosorbide mononitrate (IMDUR) 120 mg, Oral, Daily   • labetalol (NORMODYNE) 300 mg, Oral, 3 Times Daily   • minoxidil (LONITEN) 10 mg, Oral, Nightly   • montelukast (SINGULAIR) 10 mg, Oral, Nightly   • predniSONE (DELTASONE) 5 mg, Oral, Daily   • tamsulosin (FLOMAX) 0.4 MG capsule 24 hr capsule 1 capsule, Oral, Daily         Allergies:  Patient has no known allergies.          PHYSICAL EXAM:     Patient Vitals for the past 24 hrs:   BP Temp Temp src Pulse Resp SpO2 Height Weight   05/16/23 2321 135/71 98.8 °F (37.1 °C) Infrared 66 19 98 % 175.3 cm (69\") 116 kg (255 lb)       Physical Exam  Vitals and nursing note reviewed.   Constitutional:       General: He is not in acute distress.     Appearance: Normal appearance.   HENT:      Head: Normocephalic and atraumatic.   Eyes:      Extraocular Movements: Extraocular movements intact.      Conjunctiva/sclera: Conjunctivae normal.   Cardiovascular:      Rate and Rhythm: Normal rate.      Comments: Palpable thrill in the left AC fistula.  Left chest wall tunneled dialysis catheter with possible mild purulence versus granulation tissue.  Patient endorses tenderness around the site.  Unable to recognize as if erythema is present due to dark skin.  Pulmonary:      Effort: Pulmonary effort is normal. No respiratory distress.   Abdominal:      General: Abdomen is flat. There is no distension.   Musculoskeletal:         " General: No deformity. Normal range of motion.      Cervical back: Normal range of motion. No rigidity.   Skin:     Coloration: Skin is not jaundiced.      Findings: No rash.   Neurological:      General: No focal deficit present.      Mental Status: He is alert and oriented to person, place, and time.   Psychiatric:         Mood and Affect: Mood normal.         Behavior: Behavior normal.             COMPLETED DIAGNOSTIC STUDIES AND INTERVENTIONS:     Lab Results (last 24 hours)     Procedure Component Value Units Date/Time    Blood Culture - Blood, Arm, Right [[id number]] Collected: 05/17/23 0025    Specimen: Blood from Arm, Right Updated: 05/17/23 0028    Lactic Acid, Plasma [[id number]]  (Normal) Collected: 05/17/23 0053    Specimen: Blood, Central Line Updated: 05/17/23 0121     Lactate 0.7 mmol/L     Blood Culture - Blood, Blood, Central Line [[id number]] Collected: 05/17/23 0053    Specimen: Blood, Central Line Updated: 05/17/23 0100    CBC & Differential [[id number]]  (Abnormal) Collected: 05/17/23 0053    Specimen: Blood Updated: 05/17/23 0103    Narrative:      The following orders were created for panel order CBC & Differential.  Procedure                               Abnormality         Status                     ---------                               -----------         ------                     CBC Auto Differential[[id number]]        Abnormal            Final result                 Please view results for these tests on the individual orders.    Comprehensive Metabolic Panel [[id number]]  (Abnormal) Collected: 05/17/23 0053    Specimen: Blood Updated: 05/17/23 0125     Glucose 83 mg/dL      BUN 58 mg/dL      Creatinine 10.65 mg/dL      Sodium 135 mmol/L      Potassium 5.1 mmol/L      Comment: Slight hemolysis detected by analyzer. Results may be affected.        Chloride 99 mmol/L      CO2 21.5 mmol/L      Calcium 9.1 mg/dL      Total Protein 6.6 g/dL      Albumin 3.6 g/dL      ALT (SGPT) 6 U/L      AST  (SGOT) 14 U/L      Alkaline Phosphatase 52 U/L      Total Bilirubin 0.4 mg/dL      Globulin 3.0 gm/dL      A/G Ratio 1.2 g/dL      BUN/Creatinine Ratio 5.4     Anion Gap 14.5 mmol/L      eGFR 5.4 mL/min/1.73      Comment: <15 Indicative of kidney failure       Narrative:      GFR Normal >60  Chronic Kidney Disease <60  Kidney Failure <15      Protime-INR [[id number]]  (Abnormal) Collected: 05/17/23 0053    Specimen: Blood Updated: 05/17/23 0123     Protime 15.5 Seconds      INR 1.17    Narrative:      Suggested INR therapeutic range for stable oral anticoagulant therapy:    Low Intensity therapy:   1.5-2.0  Moderate Intensity therapy:   2.0-3.0  High Intensity therapy:   2.5-4.0    aPTT [[id number]]  (Normal) Collected: 05/17/23 0053    Specimen: Blood Updated: 05/17/23 0123     PTT 30.8 seconds     Narrative:      PTT Heparin Therapeutic Range:  59 - 95 seconds      C-reactive Protein [[id number]]  (Abnormal) Collected: 05/17/23 0053    Specimen: Blood Updated: 05/17/23 0125     C-Reactive Protein 11.81 mg/dL     Sedimentation Rate [[id number]]  (Abnormal) Collected: 05/17/23 0053    Specimen: Blood Updated: 05/17/23 0135     Sed Rate 26 mm/hr     CBC Auto Differential [[id number]]  (Abnormal) Collected: 05/17/23 0053    Specimen: Blood Updated: 05/17/23 0103     WBC 14.09 10*3/mm3      RBC 3.87 10*6/mm3      Hemoglobin 12.2 g/dL      Hematocrit 38.6 %      MCV 99.7 fL      MCH 31.5 pg      MCHC 31.6 g/dL      RDW 16.5 %      RDW-SD 59.2 fl      MPV 9.8 fL      Platelets 150 10*3/mm3      Neutrophil % 86.5 %      Lymphocyte % 2.9 %      Monocyte % 9.0 %      Eosinophil % 0.7 %      Basophil % 0.3 %      Immature Grans % 0.6 %      Neutrophils, Absolute 12.18 10*3/mm3      Lymphocytes, Absolute 0.41 10*3/mm3      Monocytes, Absolute 1.27 10*3/mm3      Eosinophils, Absolute 0.10 10*3/mm3      Basophils, Absolute 0.04 10*3/mm3      Immature Grans, Absolute 0.09 10*3/mm3      nRBC 0.0 /100 WBC     COVID PRE-OP /  PRE-PROCEDURE SCREENING ORDER (NO ISOLATION) - Swab, Nasopharynx [[id number]]  (Normal) Collected: 05/17/23 0104    Specimen: Swab from Nasopharynx Updated: 05/17/23 0127    Narrative:      The following orders were created for panel order COVID PRE-OP / PRE-PROCEDURE SCREENING ORDER (NO ISOLATION) - Swab, Nasopharynx.  Procedure                               Abnormality         Status                     ---------                               -----------         ------                     COVID-19 and FLU A/B PCR...[[id number]]  Normal              Final result                 Please view results for these tests on the individual orders.    COVID-19 and FLU A/B PCR - Swab, Nasopharynx [[id number]]  (Normal) Collected: 05/17/23 0104    Specimen: Swab from Nasopharynx Updated: 05/17/23 0127     COVID19 Not Detected     Influenza A PCR Not Detected     Influenza B PCR Not Detected    Narrative:      Fact sheet for providers: https://www.fda.gov/media/616512/download    Fact sheet for patients: https://www.fda.gov/media/137513/download    Test performed by PCR.            XR Chest 2 View    (Results Pending)         New Medications Ordered This Visit   Medications   • AND Linked Order Group    • vancomycin IVPB 2000 mg in 0.9% Sodium Chloride (premix) 500 mL    • Pharmacy to dose vancomycin         Procedures            MEDICAL DECISION-MAKING AND ED COURSE:     ED Course as of 05/17/23 0147   Wed May 17, 2023   0019 48-year-old male with tunneled dialysis catheter presents concern for infection based on systemic symptoms and increasing pain at the catheter site.  Will check inflammatory markers, peripheral blood cultures x2, blood culture from catheter.  No tachycardia, no indication of sepsis at this time. [KP]   0116 WBC(!): 14.09 [KP]   0116 Hemoglobin(!): 12.2 [KP]   0136 EKG at 1:27 AM sinus tachycardia 104 bpm, , , QTc 433, regular axis, no STEMI. [KP]   0136 C-Reactive Protein(!): 11.81 [KP]   0136  With heart rate greater than 100, patient is septic.  Vancomycin ordered. [KP]   0137 Sed Rate(!): 26 [KP]   0146 Case discussed with and admit to hospitalist Dr. Auguste. []      ED Course User Index  [KP] Jaquan Casas MD       ?      FINAL IMPRESSION:       1. Sepsis, due to unspecified organism, unspecified whether acute organ dysfunction present    2. Infection of hemodialysis catheter, initial encounter         The complaints listed here are new problems to this examiner.       Medication List      No changes were made to your prescriptions during this visit.         FOLLOW-UP  No follow-up provider specified.    DISPOSITION  ED Disposition     ED Disposition   Decision to Admit    Condition   --    Comment   --                 Please note that portions of this note were completed with a voice recognition program.      Jaquan Casas MD  01:47 EDT  5/17/2023             Jaquan Casas MD  05/17/23 0146       Jaquan Casas MD  05/17/23 0147      Electronically signed by Jaquan Casas MD at 05/17/23 0147         Current Facility-Administered Medications   Medication Dose Route Frequency Provider Last Rate Last Admin   • acetaminophen (TYLENOL) tablet 650 mg  650 mg Oral Q6H PRN Sylvia Gill PA-C   650 mg at 05/17/23 0421   • albuterol (PROVENTIL) nebulizer solution 0.083% 2.5 mg/3mL  2.5 mg Nebulization Q4H PRN Sylvia Gill PA-C       • ALPRAZolam (XANAX) tablet 2 mg  2 mg Oral Nightly Natali Auguste,        • amLODIPine (NORVASC) tablet 10 mg  10 mg Oral Daily Sylvia Gill PA-C       • atorvastatin (LIPITOR) tablet 40 mg  40 mg Oral Nightly Sylvia Gill PA-C       • budesonide-formoterol (SYMBICORT) 160-4.5 MCG/ACT inhaler 2 puff  2 puff Inhalation BID - RT Sylvia Gill PA-C   2 puff at 05/17/23 0636   • bumetanide (BUMEX) tablet 2 mg  2 mg Oral Daily Sylvia Gill PA-C       • buPROPion XL (WELLBUTRIN XL) 24 hr tablet 300 mg  300 mg Oral QAM Sylvia Gill PA-C    300 mg at 05/17/23 0938   • carvedilol (COREG) tablet 25 mg  25 mg Oral BID With Meals Natali Auguste DO       • [START ON 5/18/2023] cefTAZidime (FORTAZ) 1 g in sodium chloride 0.9 % 100 mL IVPB  1 g Intravenous Q24H Natali Auguste DO       • cloNIDine (CATAPRES) tablet 0.3 mg  0.3 mg Oral TID Natali Auguste DO       • isosorbide mononitrate (IMDUR) 24 hr tablet 120 mg  120 mg Oral Daily Natali Auguste DO       • linaclotide (LINZESS) capsule 145 mcg  145 mcg Oral QAM AC Sylvia Gill PA-C       • metOLazone (ZAROXOLYN) tablet 10 mg  10 mg Oral Daily Sylvia Gill PA-C       • montelukast (SINGULAIR) tablet 10 mg  10 mg Oral Nightly Sylvia Gill PA-C       • nitroglycerin (NITROSTAT) SL tablet 0.4 mg  0.4 mg Sublingual Q5 Min PRN Natali Auguste DO       • ondansetron ODT (ZOFRAN-ODT) disintegrating tablet 4 mg  4 mg Oral Q6H PRN Sylvia Gill PA-C       • pantoprazole (PROTONIX) EC tablet 40 mg  40 mg Oral Daily Sylvia Gill PA-C   40 mg at 05/17/23 0938   • rOPINIRole (REQUIP) tablet 0.25 mg  0.25 mg Oral TID Sylvia Gill PA-C   0.25 mg at 05/17/23 0938   • sodium chloride 0.9 % flush 10 mL  10 mL Intravenous Q12H Natali Auguste DO   10 mL at 05/17/23 0938   • sodium chloride 0.9 % flush 10 mL  10 mL Intravenous PRN Natali Auguste DO       • sodium chloride 0.9 % infusion 40 mL  40 mL Intravenous PRN Natali Auguste DO       • tamsulosin (FLOMAX) 24 hr capsule 0.4 mg  0.4 mg Oral Daily Sylvia Gill PA-C   0.4 mg at 05/17/23 0938   • Vancomycin Pharmacy Intermittent/Pulse Dosing   Does not apply Daily Jaquan Casas MD           Lab Results (last 24 hours)     Procedure Component Value Units Date/Time    Comprehensive Metabolic Panel [[id number]]  (Abnormal) Collected: 05/17/23 0316    Specimen: Blood Updated: 05/17/23 0408     Glucose 70 mg/dL      BUN 59 mg/dL      Creatinine 10.19 mg/dL      Sodium 133 mmol/L      Potassium 5.2  mmol/L      Chloride 98 mmol/L      CO2 19.5 mmol/L      Calcium 9.2 mg/dL      Total Protein 7.0 g/dL      Albumin 3.7 g/dL      ALT (SGPT) 7 U/L      AST (SGOT) 13 U/L      Alkaline Phosphatase 55 U/L      Total Bilirubin 0.4 mg/dL      Globulin 3.3 gm/dL      A/G Ratio 1.1 g/dL      BUN/Creatinine Ratio 5.8     Anion Gap 15.5 mmol/L      eGFR 5.7 mL/min/1.73      Comment: <15 Indicative of kidney failure       Narrative:      GFR Normal >60  Chronic Kidney Disease <60  Kidney Failure <15      CBC & Differential [[id number]]  (Abnormal) Collected: 05/17/23 0316    Specimen: Blood Updated: 05/17/23 0340    Narrative:      The following orders were created for panel order CBC & Differential.  Procedure                               Abnormality         Status                     ---------                               -----------         ------                     CBC Auto Differential[[id number]]        Abnormal            Final result                 Please view results for these tests on the individual orders.    CBC Auto Differential [[id number]]  (Abnormal) Collected: 05/17/23 0316    Specimen: Blood Updated: 05/17/23 0340     WBC 12.81 10*3/mm3      RBC 4.07 10*6/mm3      Hemoglobin 12.8 g/dL      Hematocrit 40.4 %      MCV 99.3 fL      MCH 31.4 pg      MCHC 31.7 g/dL      RDW 16.4 %      RDW-SD 59.6 fl      MPV 9.8 fL      Platelets 155 10*3/mm3      Neutrophil % 89.9 %      Lymphocyte % 2.6 %      Monocyte % 6.1 %      Eosinophil % 0.6 %      Basophil % 0.2 %      Immature Grans % 0.6 %      Neutrophils, Absolute 11.52 10*3/mm3      Lymphocytes, Absolute 0.33 10*3/mm3      Monocytes, Absolute 0.78 10*3/mm3      Eosinophils, Absolute 0.08 10*3/mm3      Basophils, Absolute 0.02 10*3/mm3      Immature Grans, Absolute 0.08 10*3/mm3      nRBC 0.0 /100 WBC     Blood Culture - Blood, Arm, Left [[id number]] Collected: 05/17/23 0317    Specimen: Blood from Arm, Left Updated: 05/17/23 0338    [place] Draw [[id number]]  Collected: 05/17/23 0053    Specimen: Blood Updated: 05/17/23 0201    Narrative:      The following orders were created for panel order [place] Draw.  Procedure                               Abnormality         Status                     ---------                               -----------         ------                     Green Top (Gel)[[id number]]                                  Final result               Lavender Top[[id number]]                                     Final result               Gold Top - SST[[id number]]                                   Final result               Light Blue Top[[id number]]                                   Final result                 Please view results for these tests on the individual orders.    Green Top (Gel) [[id number]] Collected: 05/17/23 0053    Specimen: Blood Updated: 05/17/23 0201     Extra Tube Hold for add-ons.     Comment: Auto resulted.       Gold Top - SST [[id number]] Collected: 05/17/23 0053    Specimen: Blood Updated: 05/17/23 0201     Extra Tube Hold for add-ons.     Comment: Auto resulted.       Lavender Top [[id number]] Collected: 05/17/23 0053    Specimen: Blood Updated: 05/17/23 0201     Extra Tube hold for add-on     Comment: Auto resulted       Light Blue Top [[id number]] Collected: 05/17/23 0053    Specimen: Blood Updated: 05/17/23 0201     Extra Tube Hold for add-ons.     Comment: Auto resulted       Sedimentation Rate [[id number]]  (Abnormal) Collected: 05/17/23 0053    Specimen: Blood Updated: 05/17/23 0135     Sed Rate 26 mm/hr     COVID PRE-OP / PRE-PROCEDURE SCREENING ORDER (NO ISOLATION) - Swab, Nasopharynx [[id number]]  (Normal) Collected: 05/17/23 0104    Specimen: Swab from Nasopharynx Updated: 05/17/23 0127    Narrative:      The following orders were created for panel order COVID PRE-OP / PRE-PROCEDURE SCREENING ORDER (NO ISOLATION) - Swab, Nasopharynx.  Procedure                               Abnormality         Status                     ---------                                -----------         ------                     COVID-19 and FLU A/B PCR...[[id number]]  Normal              Final result                 Please view results for these tests on the individual orders.    COVID-19 and FLU A/B PCR - Swab, Nasopharynx [[id number]]  (Normal) Collected: 05/17/23 0104    Specimen: Swab from Nasopharynx Updated: 05/17/23 0127     COVID19 Not Detected     Influenza A PCR Not Detected     Influenza B PCR Not Detected    Narrative:      Fact sheet for providers: https://www.fda.gov/media/273348/download    Fact sheet for patients: https://www.fda.gov/media/926663/download    Test performed by PCR.    Comprehensive Metabolic Panel [[id number]]  (Abnormal) Collected: 05/17/23 0053    Specimen: Blood Updated: 05/17/23 0125     Glucose 83 mg/dL      BUN 58 mg/dL      Creatinine 10.65 mg/dL      Sodium 135 mmol/L      Potassium 5.1 mmol/L      Comment: Slight hemolysis detected by analyzer. Results may be affected.        Chloride 99 mmol/L      CO2 21.5 mmol/L      Calcium 9.1 mg/dL      Total Protein 6.6 g/dL      Albumin 3.6 g/dL      ALT (SGPT) 6 U/L      AST (SGOT) 14 U/L      Alkaline Phosphatase 52 U/L      Total Bilirubin 0.4 mg/dL      Globulin 3.0 gm/dL      A/G Ratio 1.2 g/dL      BUN/Creatinine Ratio 5.4     Anion Gap 14.5 mmol/L      eGFR 5.4 mL/min/1.73      Comment: <15 Indicative of kidney failure       Narrative:      GFR Normal >60  Chronic Kidney Disease <60  Kidney Failure <15      C-reactive Protein [[id number]]  (Abnormal) Collected: 05/17/23 0053    Specimen: Blood Updated: 05/17/23 0125     C-Reactive Protein 11.81 mg/dL     Protime-INR [[id number]]  (Abnormal) Collected: 05/17/23 0053    Specimen: Blood Updated: 05/17/23 0123     Protime 15.5 Seconds      INR 1.17    Narrative:      Suggested INR therapeutic range for stable oral anticoagulant therapy:    Low Intensity therapy:   1.5-2.0  Moderate Intensity therapy:   2.0-3.0  High Intensity therapy:    2.5-4.0    aPTT [[id number]]  (Normal) Collected: 05/17/23 0053    Specimen: Blood Updated: 05/17/23 0123     PTT 30.8 seconds     Narrative:      PTT Heparin Therapeutic Range:  59 - 95 seconds      Lactic Acid, Plasma [[id number]]  (Normal) Collected: 05/17/23 0053    Specimen: Blood, Central Line Updated: 05/17/23 0121     Lactate 0.7 mmol/L     CBC & Differential [[id number]]  (Abnormal) Collected: 05/17/23 0053    Specimen: Blood Updated: 05/17/23 0103    Narrative:      The following orders were created for panel order CBC & Differential.  Procedure                               Abnormality         Status                     ---------                               -----------         ------                     CBC Auto Differential[[id number]]        Abnormal            Final result                 Please view results for these tests on the individual orders.    CBC Auto Differential [[id number]]  (Abnormal) Collected: 05/17/23 0053    Specimen: Blood Updated: 05/17/23 0103     WBC 14.09 10*3/mm3      RBC 3.87 10*6/mm3      Hemoglobin 12.2 g/dL      Hematocrit 38.6 %      MCV 99.7 fL      MCH 31.5 pg      MCHC 31.6 g/dL      RDW 16.5 %      RDW-SD 59.2 fl      MPV 9.8 fL      Platelets 150 10*3/mm3      Neutrophil % 86.5 %      Lymphocyte % 2.9 %      Monocyte % 9.0 %      Eosinophil % 0.7 %      Basophil % 0.3 %      Immature Grans % 0.6 %      Neutrophils, Absolute 12.18 10*3/mm3      Lymphocytes, Absolute 0.41 10*3/mm3      Monocytes, Absolute 1.27 10*3/mm3      Eosinophils, Absolute 0.10 10*3/mm3      Basophils, Absolute 0.04 10*3/mm3      Immature Grans, Absolute 0.09 10*3/mm3      nRBC 0.0 /100 WBC     Blood Culture - Blood, Blood, Central Line [[id number]] Collected: 05/17/23 0053    Specimen: Blood, Central Line Updated: 05/17/23 0100    Blood Culture - Blood, Arm, Right [[id number]] Collected: 05/17/23 0025    Specimen: Blood from Arm, Right Updated: 05/17/23 0028

## 2023-05-17 NOTE — PROGRESS NOTES
Nephrology Progress Note      Subjective     Patient is known to have ESKD on IHDx on TTS, last dialysis was on Tuesday. He is here with fever, malaise and pus coming out from left IJ TDC. Pt denied any chest pain or shortness of breath.   He has left AVF that has not matured yet. Pt denied any chest pain or shortness of breath.     Objective       Vital signs :     Temp:  [97.6 °F (36.4 °C)-98.8 °F (37.1 °C)] 97.6 °F (36.4 °C)  Heart Rate:  [] 92  Resp:  [18-19] 18  BP: (134-150)/(71-94) 150/94    Intake/Output                 05/17/23 0701 - 05/18/23 0700     [id number] Total              Intake    P.O.  360  -- 360    Total Intake 360 -- 360       Output    Total Output -- -- --           Physical Exam:    General Appearance : not in acute distress  Lungs : clear to auscultation, respirations regular  Heart :  regular rhythm & normal rate, normal S1, S2 and no murmur, no rub  Abdomen : soft, non distended  Extremities : no edema  Neurologic :   orientated to person, place, time and situation, Grossly no focal deficits  IJ TDC with exit site infection and tunnel infection    Laboratory Data :     Albumin Albumin   Date Value Ref Range Status   05/17/2023 3.7 3.5 - 5.2 g/dL Final   05/17/2023 3.6 3.5 - 5.2 g/dL Final      Magnesium No results found for: MG       PTH               No results found for: PTH    CBC and coagulation:  Results from last 7 days   Lab Units 05/17/23  0316 05/17/23  0053   LACTATE mmol/L  --  0.7   SED RATE mm/hr  --  26*   CRP mg/dL  --  11.81*   WBC 10*3/mm3 12.81* 14.09*   HEMOGLOBIN g/dL 12.8* 12.2*   HEMATOCRIT % 40.4 38.6   MCV fL 99.3* 99.7*   MCHC g/dL 31.7 31.6   PLATELETS 10*3/mm3 155 150   INR   --  1.17*     Acid/base balance:      Renal and electrolytes:    Results from last 7 days   Lab Units 05/17/23  0316 05/17/23  0053   SODIUM mmol/L 133* 135*   POTASSIUM mmol/L 5.2 5.1   CHLORIDE mmol/L 98 99   CO2 mmol/L 19.5* 21.5*   BUN mg/dL 59* 58*   CREATININE mg/dL  10.19* 10.65*   CALCIUM mg/dL 9.2 9.1     Estimated Creatinine Clearance: 11.3 mL/min (A) (by C-G formula based on SCr of 10.19 mg/dL (H)).  @GFRCG:3@   Liver and pancreatic function:  Results from last 7 days   Lab Units 05/17/23  0316 05/17/23  0053   ALBUMIN g/dL 3.7 3.6   BILIRUBIN mg/dL 0.4 0.4   ALK PHOS U/L 55 52   AST (SGOT) U/L 13 14   ALT (SGPT) U/L 7 6         Cardiac:      Liver and pancreatic function:  Results from last 7 days   Lab Units 05/17/23  0316 05/17/23  0053   ALBUMIN g/dL 3.7 3.6   BILIRUBIN mg/dL 0.4 0.4   ALK PHOS U/L 55 52   AST (SGOT) U/L 13 14   ALT (SGPT) U/L 7 6       Medications :     ALPRAZolam, 2 mg, Oral, Nightly  amLODIPine, 10 mg, Oral, Daily  atorvastatin, 40 mg, Oral, Nightly  budesonide-formoterol, 2 puff, Inhalation, BID - RT  bumetanide, 2 mg, Oral, Daily  buPROPion XL, 300 mg, Oral, QAM  carvedilol, 25 mg, Oral, BID With Meals  [START ON 5/18/2023] cefTAZidime, 1 g, Intravenous, Q24H  cloNIDine, 0.3 mg, Oral, TID  isosorbide mononitrate, 120 mg, Oral, Daily  linaclotide, 145 mcg, Oral, QAM AC  metOLazone, 10 mg, Oral, Daily  montelukast, 10 mg, Oral, Nightly  pantoprazole, 40 mg, Oral, Daily  rOPINIRole, 0.25 mg, Oral, TID  sodium chloride, 10 mL, Intravenous, Q12H  tamsulosin, 0.4 mg, Oral, Daily  Vancomycin Pharmacy Intermittent/Pulse Dosing, , Does not apply, Daily             Assessment & Plan     1. ESKD on IHDx  2. Sever sepsis likely due to tunneled dialysis cath infection  3. Anemia  4. SHPT    Dialysis today and tomorrow, followed by TDC to be removed.   Continue on broad spectrum antibiotics for now and await BC      Jake Lala MD  05/17/23  11:30 EDT

## 2023-05-17 NOTE — ED PROVIDER NOTES
[facility]  emergency department encounter        Pt Name: Sudarshan Keene  MRN: [id number]  Birthdate 1975  Date of evaluation: 5/17/2023    Chief Complaint   Patient presents with   • Vascular Access Problem             HISTORY OF PRESENT ILLNESS:   Sudarshan Keene is a 48 y.o. male PMH HTN, COPD/asthma, CAD, CHF, arthritis, KATHLEEN, morbid obesity, paroxysmal atrial fibrillation chronically anticoagulated, ESRD on dialysis.  Dialyzed Tuesday Thursday Saturday, last dialyzed yesterday.    Patient had tunneled dialysis catheter placed at [facility] by Dr. Laws on 4/22/23.    Patient presents with predominant concern for infected catheter in the left upper chest wall.  Patient reports symptom onset yesterday including pain around the port site, dizziness, nausea, vomiting, chills, decreased p.o. intake.  Patient was seen at [place] ED yesterday, they recommended he follow-up at dialysis clinic.  Patient went to dialysis clinic where they perform dialysis but did not further investigate.  Patient called to follow-up with his vascular surgeon Dr. Laws, but did not hear back prompting patient to come here for evaluation.    Has fistula in the left antecubital that is few more weeks to maturity.  PCP: Marah Bain APRN          REVIEW OF SYSTEMS:     Review of Systems   Constitutional: Positive for appetite change and chills. Negative for fever.   HENT: Negative for congestion and rhinorrhea.    Eyes: Negative for visual disturbance.   Respiratory: Negative for cough and shortness of breath.    Cardiovascular: Negative for chest pain.   Gastrointestinal: Positive for nausea and vomiting. Negative for abdominal pain.   Genitourinary: Negative for dysuria.   Musculoskeletal: Negative for myalgias.   Skin: Negative for rash.   Neurological: Positive for dizziness. Negative for headaches.   Psychiatric/Behavioral: Negative for confusion.       Review of systems otherwise as per  HPI.          PREVIOUS HISTORY:         Past Medical History:   Diagnosis Date   • Arthritis    • Asthma    • CHF (congestive heart failure)    • COPD (chronic obstructive pulmonary disease)    • Coronary artery disease    • Hypertension    • Sleep apnea            Past Surgical History:   Procedure Laterality Date   • CARDIAC CATHETERIZATION  2008   • HERNIA REPAIR             Social History     Socioeconomic History   • Marital status: Single   Tobacco Use   • Smoking status: Former     Packs/day: 0.25     Types: Cigarettes     Quit date: 2014     Years since quittin.7   • Smokeless tobacco: Never   Substance and Sexual Activity   • Alcohol use: Never   • Drug use: Yes     Frequency: 7.0 times per week     Types: Marijuana     Comment: tobacco free   • Sexual activity: Yes     Partners: Female           Family History   Problem Relation Age of Onset   • Hypertension Mother    • Hypertension Father    • Hypertension Sister    • Heart disease Sister          Current Outpatient Medications   Medication Instructions   • ALPRAZolam (XANAX) 1 mg, Oral, Nightly   • amLODIPine (NORVASC) 10 mg, Oral, Daily   • apixaban (ELIQUIS) 5 mg, Oral, 2 Times Daily   • atorvastatin (LIPITOR) 40 mg, Oral, Nightly   • bumetanide (BUMEX) 1 mg, Oral, Daily   • buPROPion XL (WELLBUTRIN XL) 300 mg, Oral, Every Morning   • cloNIDine (CATAPRES) 0.3 mg, Oral, 3 Times Daily   • ezetimibe (ZETIA) 10 mg, Oral, Daily   • ferrous sulfate 325 mg, Oral, Daily With Breakfast   • isosorbide mononitrate (IMDUR) 120 mg, Oral, Daily   • labetalol (NORMODYNE) 300 mg, Oral, 3 Times Daily   • minoxidil (LONITEN) 10 mg, Oral, Nightly   • montelukast (SINGULAIR) 10 mg, Oral, Nightly   • predniSONE (DELTASONE) 5 mg, Oral, Daily   • tamsulosin (FLOMAX) 0.4 MG capsule 24 hr capsule 1 capsule, Oral, Daily         Allergies:  Patient has no known allergies.          PHYSICAL EXAM:     Patient Vitals for the past 24 hrs:   BP Temp Temp src Pulse  "Resp SpO2 Height Weight   05/16/23 2321 135/71 98.8 °F (37.1 °C) Infrared 66 19 98 % 175.3 cm (69\") 116 kg (255 lb)       Physical Exam  Vitals and nursing note reviewed.   Constitutional:       General: He is not in acute distress.     Appearance: Normal appearance.   HENT:      Head: Normocephalic and atraumatic.   Eyes:      Extraocular Movements: Extraocular movements intact.      Conjunctiva/sclera: Conjunctivae normal.   Cardiovascular:      Rate and Rhythm: Normal rate.      Comments: Palpable thrill in the left AC fistula.  Left chest wall tunneled dialysis catheter with possible mild purulence versus granulation tissue.  Patient endorses tenderness around the site.  Unable to recognize as if erythema is present due to dark skin.  Pulmonary:      Effort: Pulmonary effort is normal. No respiratory distress.   Abdominal:      General: Abdomen is flat. There is no distension.   Musculoskeletal:         General: No deformity. Normal range of motion.      Cervical back: Normal range of motion. No rigidity.   Skin:     Coloration: Skin is not jaundiced.      Findings: No rash.   Neurological:      General: No focal deficit present.      Mental Status: He is alert and oriented to person, place, and time.   Psychiatric:         Mood and Affect: Mood normal.         Behavior: Behavior normal.             COMPLETED DIAGNOSTIC STUDIES AND INTERVENTIONS:     Lab Results (last 24 hours)     Procedure Component Value Units Date/Time    Blood Culture - Blood, Arm, Right [[id number]] Collected: 05/17/23 0025    Specimen: Blood from Arm, Right Updated: 05/17/23 0028    Lactic Acid, Plasma [[id number]]  (Normal) Collected: 05/17/23 0053    Specimen: Blood, Central Line Updated: 05/17/23 0121     Lactate 0.7 mmol/L     Blood Culture - Blood, Blood, Central Line [[id number]] Collected: 05/17/23 0053    Specimen: Blood, Central Line Updated: 05/17/23 0100    CBC & Differential [[id number]]  (Abnormal) Collected: 05/17/23 0053    " Specimen: Blood Updated: 05/17/23 0103    Narrative:      The following orders were created for panel order CBC & Differential.  Procedure                               Abnormality         Status                     ---------                               -----------         ------                     CBC Auto Differential[[id number]]        Abnormal            Final result                 Please view results for these tests on the individual orders.    Comprehensive Metabolic Panel [[id number]]  (Abnormal) Collected: 05/17/23 0053    Specimen: Blood Updated: 05/17/23 0125     Glucose 83 mg/dL      BUN 58 mg/dL      Creatinine 10.65 mg/dL      Sodium 135 mmol/L      Potassium 5.1 mmol/L      Comment: Slight hemolysis detected by analyzer. Results may be affected.        Chloride 99 mmol/L      CO2 21.5 mmol/L      Calcium 9.1 mg/dL      Total Protein 6.6 g/dL      Albumin 3.6 g/dL      ALT (SGPT) 6 U/L      AST (SGOT) 14 U/L      Alkaline Phosphatase 52 U/L      Total Bilirubin 0.4 mg/dL      Globulin 3.0 gm/dL      A/G Ratio 1.2 g/dL      BUN/Creatinine Ratio 5.4     Anion Gap 14.5 mmol/L      eGFR 5.4 mL/min/1.73      Comment: <15 Indicative of kidney failure       Narrative:      GFR Normal >60  Chronic Kidney Disease <60  Kidney Failure <15      Protime-INR [[id number]]  (Abnormal) Collected: 05/17/23 0053    Specimen: Blood Updated: 05/17/23 0123     Protime 15.5 Seconds      INR 1.17    Narrative:      Suggested INR therapeutic range for stable oral anticoagulant therapy:    Low Intensity therapy:   1.5-2.0  Moderate Intensity therapy:   2.0-3.0  High Intensity therapy:   2.5-4.0    aPTT [[id number]]  (Normal) Collected: 05/17/23 0053    Specimen: Blood Updated: 05/17/23 0123     PTT 30.8 seconds     Narrative:      PTT Heparin Therapeutic Range:  59 - 95 seconds      C-reactive Protein [[id number]]  (Abnormal) Collected: 05/17/23 0053    Specimen: Blood Updated: 05/17/23 0125     C-Reactive Protein 11.81 mg/dL      Sedimentation Rate [[id number]]  (Abnormal) Collected: 05/17/23 0053    Specimen: Blood Updated: 05/17/23 0135     Sed Rate 26 mm/hr     CBC Auto Differential [[id number]]  (Abnormal) Collected: 05/17/23 0053    Specimen: Blood Updated: 05/17/23 0103     WBC 14.09 10*3/mm3      RBC 3.87 10*6/mm3      Hemoglobin 12.2 g/dL      Hematocrit 38.6 %      MCV 99.7 fL      MCH 31.5 pg      MCHC 31.6 g/dL      RDW 16.5 %      RDW-SD 59.2 fl      MPV 9.8 fL      Platelets 150 10*3/mm3      Neutrophil % 86.5 %      Lymphocyte % 2.9 %      Monocyte % 9.0 %      Eosinophil % 0.7 %      Basophil % 0.3 %      Immature Grans % 0.6 %      Neutrophils, Absolute 12.18 10*3/mm3      Lymphocytes, Absolute 0.41 10*3/mm3      Monocytes, Absolute 1.27 10*3/mm3      Eosinophils, Absolute 0.10 10*3/mm3      Basophils, Absolute 0.04 10*3/mm3      Immature Grans, Absolute 0.09 10*3/mm3      nRBC 0.0 /100 WBC     COVID PRE-OP / PRE-PROCEDURE SCREENING ORDER (NO ISOLATION) - Swab, Nasopharynx [[id number]]  (Normal) Collected: 05/17/23 0104    Specimen: Swab from Nasopharynx Updated: 05/17/23 0127    Narrative:      The following orders were created for panel order COVID PRE-OP / PRE-PROCEDURE SCREENING ORDER (NO ISOLATION) - Swab, Nasopharynx.  Procedure                               Abnormality         Status                     ---------                               -----------         ------                     COVID-19 and FLU A/B PCR...[[id number]]  Normal              Final result                 Please view results for these tests on the individual orders.    COVID-19 and FLU A/B PCR - Swab, Nasopharynx [[id number]]  (Normal) Collected: 05/17/23 0104    Specimen: Swab from Nasopharynx Updated: 05/17/23 0127     COVID19 Not Detected     Influenza A PCR Not Detected     Influenza B PCR Not Detected    Narrative:      Fact sheet for providers: https://www.fda.gov/media/074907/download    Fact sheet for patients:  https://www.fda.gov/media/275342/download    Test performed by PCR.            XR Chest 2 View    (Results Pending)         New Medications Ordered This Visit   Medications   • AND Linked Order Group    • vancomycin IVPB 2000 mg in 0.9% Sodium Chloride (premix) 500 mL    • Pharmacy to dose vancomycin         Procedures            MEDICAL DECISION-MAKING AND ED COURSE:     ED Course as of 05/17/23 0147   Wed May 17, 2023   0019 48-year-old male with tunneled dialysis catheter presents concern for infection based on systemic symptoms and increasing pain at the catheter site.  Will check inflammatory markers, peripheral blood cultures x2, blood culture from catheter.  No tachycardia, no indication of sepsis at this time. [KP]   0116 WBC(!): 14.09 [KP]   0116 Hemoglobin(!): 12.2 [KP]   0136 EKG at 1:27 AM sinus tachycardia 104 bpm, , , QTc 433, regular axis, no STEMI. [KP]   0136 C-Reactive Protein(!): 11.81 [KP]   0136 With heart rate greater than 100, patient is septic.  Vancomycin ordered. [KP]   0137 Sed Rate(!): 26 [KP]   0146 Case discussed with and admit to hospitalist Dr. Auguste. [KP]      ED Course User Index  [KP] Jaquan Casas MD       ?      FINAL IMPRESSION:       1. Sepsis, due to unspecified organism, unspecified whether acute organ dysfunction present    2. Infection of hemodialysis catheter, initial encounter         The complaints listed here are new problems to this examiner.       Medication List      No changes were made to your prescriptions during this visit.         FOLLOW-UP  No follow-up provider specified.    DISPOSITION  ED Disposition     ED Disposition   Decision to Admit    Condition   --    Comment   --                 Please note that portions of this note were completed with a voice recognition program.      Jaquan Casas MD  01:47 EDT  5/17/2023             Jaquan Casas MD  05/17/23 0146       Jaquan Casas MD  05/17/23 0147

## 2023-05-17 NOTE — PAYOR COMM NOTE
"CONTACT:  Nicole Jules RN    Utilization Management Dept.   [facility]  [address] [place] [address]    Phone:[phone]  Fax: [phone]    REQUEST FOR INPATIENT AUTHORIZATION  REF # [id number]  ICD 10: A41.9  ATTENDING MD:Geovanny Mosquera   NPI: [id number]  FACILITY NPI: [id number]  ADM DATE: 5/17/23      Elisha Keene \"[place]\" (48 y.o. Male)     Date of Birth   1975    Social Security Number       Address   [address] [place], [place] [address]    Home Phone   [phone]    MRN   [id number]       [facility]   [facility]    Marital Status   Single                            Admission Date   5/16/23    Admission Type   Emergency    Admitting Provider   Natali Auguste DO    Attending Provider   Geovanny Mosquera Jr., MD    Department, Room/Bed   [facility], 3304/1S       Discharge Date       Discharge Disposition       Discharge Destination                               Attending Provider: Geovanny Mosquera Jr., MD    Allergies: No Known Allergies    Isolation: None   Infection: None   Code Status: CPR    Ht: 175.3 cm (69\")   Wt: 120 kg (263 lb 10.7 oz)    Admission Cmt: None   Principal Problem: Sepsis, due to unspecified organism, unspecified whether acute organ dysfunction present [A41.9]                 Active Insurance as of 5/16/2023     Primary Coverage     Payor Plan Insurance Group Employer/Plan Group    ANTHEM MEDICARE REPLACEMENT ANTHEM MEDICARE ADVANTAGE [id number]     Payor Plan Address Payor Plan Phone Number Payor Plan Fax Number Effective Dates    [address] [phone]  1/1/2020 - None Entered    [facility] [id number]       Subscriber Name Subscriber Birth Date Member ID       ELISHA KEENE 1975 [id number]           Secondary Coverage     Payor Plan Insurance Group Employer/Plan Group    [employer]LL[employer] [place] WELL[place]ARE MEDICAID      Payor Plan Address Payor Plan Phone Number Payor Plan Fax Number Effective Dates    [address] [address] " [phone]  2020 - None Entered    [facility] [id number]       Subscriber Name Subscriber Birth Date Member ID       ELISHA KEENE 1975 [id number]                 Emergency Contacts      (Rel.) Home Phone Work Phone Mobile Phone    HUAN LUI (Other) [phone] -- --    holland caruso (Mother) -- -- [phone]    Kit Keene (Father) [phone] -- --               History & Physical      Sylvia Gill PA-C at 23 0243     Attestation signed by Natali Auguste DO at 23 5202    I have reviewed this documentation and agree. Patient independently seen and examined at bedside. Patient resting; only complaint is feeling hot/wanting temperature turned down. No nausea or vomiting at present.     Left chest with port in place; Left TDC with some serosanguinous drainage; no appreciable warmth or erythema.     Sepsis, POA, concern for catheter related blood stream infection  ESRD on HD //  HTN  PAF on chronic a/c with eliquis    Admit to telemetry. Blood cultures ordered in the ED.  Patient will be started on pharmacy to dose vancomycin and pharmacy to dose ceftazidime while pending final blood culture results.  Nephrology consulted and will defer to them regarding removal of patient's tunneled dialysis catheter; may need to consult general surgery upon nephrology recommendations.  Patient has a left upper extremity fistula that has not yet matured.  We will hold patient's Eliquis for now pending nephrology recommendations.  If bacteremia confirmed then would order echocardiogram and consider infectious disease consultation.                       [facility] Medicine Services  HISTORY & PHYSICAL    Patient Identification:  Name:  Elisha Keene  Age:  48 y.o.  Sex:  male  :  1975  MRN:  [id number]   Visit Number:  [id number]  Admit Date: 2023   Primary Care Physician:  Marah Bain APRN     Subjective     Chief complaint:   Chief  Complaint   Patient presents with   • Vascular Access Problem     History of presenting illness:   Patient is a 48 y.o. male with past medical history significant for hypertension, COPD/asthma, CAD, CHF, arthritis, KATHLEEN, morbid obesity, paroxysmal atrial fibrillation chronically anticoagulated, ESRD on dialysis that presented to the [facility] emergency department for evaluation of concerns of an infected dialysis catheter.  States he had a tunneled dialysis catheter placed at [facility], [place], operated by [facility] by Dr. Bennett on 4/22/2023.  Patient states he has had pain around the port site, dizziness, nausea, vomiting, chills, decreased intake.  Patient states he went to [place] ED yesterday and they recommended follow-up at dialysis clinic.  Patient went to dialysis clinic where they performed dialysis but did not further get investigate.  Patient gets dialyzed every Tuesday Thursday and Saturday.  Patient called to follow-up with his vascular surgeon Dr. Bennett but did not hear back therefore he came to the ED today for evaluation.  On my exam patient resting comfortably in bed.  Patient states he began noticing the area around his port, painful approximately 2 days ago.  Patient also notes that it has been draining serosanguineous fluid and pus around the insertion point.  He states it has been enough to saturate the pad around the base of the port.  Patient states he has had a port get infected in the past and this is similar to what happened then.  He states he was hospitalized in [place] at that time.  Patient states he has also felt feverish and chilled, but has not taken his temperature.  Patient also complains of fatigue and nausea.  Patient states he was hospitalized at [place] around 1 month ago.    Upon arrival to the ED, vitals were temperature 98.8, HR 66, RR 19, /71, SPO2 98% on room air.  Labs significant for creatinine 10.65, BUN 58, BUN/creatinine ratio 5.4, GFR 5.4.  WBC 14.09  neutrophil % 86.5, absolute neutrophils 12.18, sed rate 26, CRP 11.81.      Chest x-ray shows mildly enlarged heart size.  No lobar consolidation or edema.  No pleural effusion or pneumothorax.  Left neck central vascular catheter with tip in SVC/RA junction.  Left anterior chest port with catheter tip into the SVC.  No features to suggest mass.  No air in soft tissues.    Patient has been admitted to the telemetry unit.  ---------------------------------------------------------------------------------------------------------------------   Review of Systems   Constitutional: Positive for chills and fatigue. Negative for diaphoresis and fever.   Respiratory: Negative for cough, shortness of breath and wheezing.    Cardiovascular: Negative for chest pain, palpitations and leg swelling.   Gastrointestinal: Negative for abdominal pain, constipation, diarrhea, nausea and vomiting.   Genitourinary: Negative for flank pain and hematuria.   Musculoskeletal: Negative for arthralgias, myalgias and neck stiffness.   Skin: Negative for rash and wound.        Erythema and purulent drainage around port site   Neurological: Negative for dizziness, weakness and light-headedness.   Psychiatric/Behavioral: Negative for agitation and confusion.      ---------------------------------------------------------------------------------------------------------------------   Past Medical History:   Diagnosis Date   • Arthritis    • Asthma    • CHF (congestive heart failure)    • COPD (chronic obstructive pulmonary disease)    • Coronary artery disease    • Hypertension    • Sleep apnea      Past Surgical History:   Procedure Laterality Date   • CARDIAC CATHETERIZATION  2008 2006   • HERNIA REPAIR  1982     Family History   Problem Relation Age of Onset   • Hypertension Mother    • Hypertension Father    • Hypertension Sister    • Heart disease Sister      Social History     Socioeconomic History   • Marital status: Single   Tobacco Use   •  Smoking status: Former     Packs/day: 0.25     Types: Cigarettes     Quit date: 2014     Years since quittin.7     Passive exposure: Never   • Smokeless tobacco: Never   Vaping Use   • Vaping Use: Never used   Substance and Sexual Activity   • Alcohol use: Never   • Drug use: Yes     Frequency: 7.0 times per week     Types: Marijuana     Comment: tobacco free   • Sexual activity: Yes     Partners: Female     ---------------------------------------------------------------------------------------------------------------------   Allergies:  Patient has no known allergies.  ---------------------------------------------------------------------------------------------------------------------   Medications below are reported home medications pulling from within the system; at this time, these medications have not been reconciled unless otherwise specified and are in the verification process for further verifcation as current home medications.    Prior to Admission Medications     Prescriptions Last Dose Informant Patient Reported? Taking?    albuterol sulfate  (90 Base) MCG/ACT inhaler 2023 Self, Other Yes Yes    Inhale 2 puffs Every 4 (Four) Hours As Needed for Wheezing.    ALPRAZolam (XANAX) 1 MG tablet 2023 Self, Other Yes Yes    Take 2 tablets by mouth Every Night.    amLODIPine (NORVASC) 10 MG tablet 2023 Self, Other Yes Yes    Take 1 tablet by mouth Daily.    apixaban (ELIQUIS) 5 MG tablet tablet 2023 Self, Other Yes Yes    Take 1 tablet by mouth 2 (Two) Times a Day.    atorvastatin (LIPITOR) 40 MG tablet 2023 Self, Other Yes Yes    Take 1 tablet by mouth Every Night.    budesonide-formoterol (SYMBICORT) 160-4.5 MCG/ACT inhaler 2023 Self, Other Yes Yes    Inhale 2 puffs 2 (Two) Times a Day.    bumetanide (BUMEX) 2 MG tablet 2023 Self, Other Yes Yes    Take 1 tablet by mouth Daily.    buPROPion XL (WELLBUTRIN XL) 300 MG 24 hr tablet 2023 Self, Other Yes Yes     Take 1 tablet by mouth Every Morning.    carvedilol (COREG) 25 MG tablet 5/16/2023 Self, Other Yes Yes    Take 1 tablet by mouth 2 (Two) Times a Day With Meals.    cloNIDine (CATAPRES) 0.3 MG tablet 5/16/2023 Self, Other Yes Yes    Take 1 tablet by mouth 3 (Three) Times a Day.    ezetimibe (ZETIA) 10 MG tablet 5/16/2023 Self, Other Yes Yes    Take 1 tablet by mouth Daily.    isosorbide mononitrate (IMDUR) 120 MG 24 hr tablet 5/16/2023 Self, Other Yes Yes    Take 1 tablet by mouth Daily.    linaclotide (LINZESS) 145 MCG capsule capsule 5/16/2023 Self, Other Yes Yes    Take 1 capsule by mouth Every Morning Before Breakfast.    metOLazone (ZAROXOLYN) 10 MG tablet 5/16/2023 Self, Other Yes Yes    Take 1 tablet by mouth Daily.    montelukast (SINGULAIR) 10 MG tablet 5/16/2023 Self, Other Yes Yes    Take 1 tablet by mouth Every Night.    ondansetron (ZOFRAN) 8 MG tablet 5/16/2023 Self, Other Yes Yes    Take 1 tablet by mouth Every 8 (Eight) Hours As Needed for Nausea or Vomiting.    pantoprazole (PROTONIX) 40 MG EC tablet 5/16/2023 Self, Other Yes Yes    Take 1 tablet by mouth Daily.    rOPINIRole (REQUIP) 0.25 MG tablet 5/16/2023 Self, Other Yes Yes    Take 1 tablet by mouth 3 (Three) Times a Day.    tamsulosin (FLOMAX) 0.4 MG capsule 24 hr capsule 5/16/2023 Self, Other Yes Yes    Take 1 capsule by mouth Daily.        ---------------------------------------------------------------------------------------------------------------------    Objective     Hospital Scheduled Meds:  heparin (porcine), 5,000 Units, Subcutaneous, Q12H  sodium chloride, 10 mL, Intravenous, Q12H  vancomycin, 2,000 mg, Intravenous, Once  Vancomycin Pharmacy Intermittent/Pulse Dosing, , Does not apply, Daily           Current listed hospital scheduled medications may not yet reflect those currently placed in orders that are signed and held, awaiting patient's arrival to floor/unit.     ---------------------------------------------------------------------------------------------------------------------   Vital Signs:  Temp:  [98.3 °F (36.8 °C)-98.8 °F (37.1 °C)] 98.3 °F (36.8 °C)  Heart Rate:  [66-99] 99  Resp:  [18-19] 18  BP: (135-140)/(71-89) 140/89  Mean Arterial Pressure (Non-Invasive) for the past 24 hrs (Last 3 readings):   Noninvasive MAP (mmHg)   05/17/23 0318 104     SpO2 Percentage    05/16/23 2321 05/17/23 0305 05/17/23 0318   SpO2: 98% 97% 98%     SpO2:  [97 %-98 %] 98 %  on   ;   Device (Oxygen Therapy): room air    Body mass index is 38.94 kg/m².  Wt Readings from Last 3 Encounters:   05/17/23 120 kg (263 lb 10.7 oz)   12/21/21 (!) 148 kg (326 lb)   08/04/20 (!) 166 kg (366 lb 11.2 oz)     ---------------------------------------------------------------------------------------------------------------------   Physical Exam:  Physical Exam  Constitutional:       General: He is not in acute distress.     Appearance: Normal appearance.   HENT:      Head: Normocephalic and atraumatic.      Right Ear: External ear normal.      Left Ear: External ear normal.      Nose: No nasal deformity.      Mouth/Throat:      Lips: Pink.      Mouth: Mucous membranes are moist.   Eyes:      Conjunctiva/sclera: Conjunctivae normal.      Pupils: Pupils are equal, round, and reactive to light.   Cardiovascular:      Rate and Rhythm: Normal rate and regular rhythm.      Pulses:           Dorsalis pedis pulses are 2+ on the right side and 2+ on the left side.      Heart sounds: Normal heart sounds.   Pulmonary:      Effort: Pulmonary effort is normal.      Breath sounds: Normal breath sounds. No wheezing, rhonchi or rales.   Abdominal:      General: Abdomen is flat. Bowel sounds are normal.      Palpations: Abdomen is soft.      Tenderness: There is no guarding or rebound.   Genitourinary:     Comments: No fajardo catheter in place   Musculoskeletal:      Cervical back: Neck supple. Normal range of motion.       Right lower leg: No edema.      Left lower leg: No edema.   Skin:     General: Skin is warm and dry.      Comments: Warmth around port site, maybe slight erythema but hard to observe due to deep skin tone. No active drainage around port site   Neurological:      General: No focal deficit present.      Mental Status: He is alert and oriented to person, place, and time.   Psychiatric:         Mood and Affect: Mood normal.         Behavior: Behavior normal.       ---------------------------------------------------------------------------------------------------------------------  EKG: EKG shows sinus tachycardia.  Awaiting confirmation by cardiology.      Telemetry:    Patient not yet on telemetry.      I have personally reviewed the EKG/Telemetry strip  ---------------------------------------------------------------------------------------------------------------------             Results from last 7 days   Lab Units 05/17/23  0316 05/17/23 0053   CRP mg/dL  --  11.81*   LACTATE mmol/L  --  0.7   WBC 10*3/mm3 12.81* 14.09*   HEMOGLOBIN g/dL 12.8* 12.2*   HEMATOCRIT % 40.4 38.6   MCV fL 99.3* 99.7*   MCHC g/dL 31.7 31.6   PLATELETS 10*3/mm3 155 150   INR   --  1.17*     Results from last 7 days   Lab Units 05/17/23 0053   SODIUM mmol/L 135*   POTASSIUM mmol/L 5.1   CHLORIDE mmol/L 99   CO2 mmol/L 21.5*   BUN mg/dL 58*   CREATININE mg/dL 10.65*   CALCIUM mg/dL 9.1   GLUCOSE mg/dL 83   ALBUMIN g/dL 3.6   BILIRUBIN mg/dL 0.4   ALK PHOS U/L 52   AST (SGOT) U/L 14   ALT (SGPT) U/L 6   Estimated Creatinine Clearance: 10.8 mL/min (A) (by C-G formula based on SCr of 10.65 mg/dL (H)).  No results found for: AMMONIA    No results found for: HGBA1C, POCGLU  Lab Results   Component Value Date    HGBA1C 5.60 08/01/2020     Lab Results   Component Value Date    TSH 1.670 08/01/2020    FREET4 1.05 12/21/2015       No results found for: BLOODCX  No results found for: URINECX  No results found for: WOUNDCX  No results found for:  STOOLCX  No results found for: RESPCX  No results found for: MRSACX  Pain Management Panel         Latest Ref Rng & Units 12/21/2021 12/19/2021   Pain Management Panel   Creatinine, Urine mg/dL 48.3   64.1                I have personally reviewed the above laboratory results.   ---------------------------------------------------------------------------------------------------------------------  Imaging Results (Last 7 Days)     Procedure Component Value Units Date/Time    XR Chest 2 View [[id number]] Collected: 05/17/23 0246     Updated: 05/17/23 0250    Narrative:      Procedure: X-ray examination of the chest performed on 05/17/2023. 2  views.     HISTORY: Infection in the neck. Evaluate chest.     FINDINGS:     Mildly enlarged heart size.  Left neck central vascular catheter with tip to the SVC/RA junction.  Left anterior chest port with catheter tip in the SVC. Lower  consolidation or edema.  No pleural effusion or pneumothorax.  Mild hypoinflated lungs.  No fracture or dislocation.       Impression:         1.  Mildly enlarged heart size.  2.  No lobar consolidation or edema.  3.  No pleural effusion or pneumothorax.  4.  Left neck central vascular catheter with tip to the SVC/RA junction.  5.  Left anterior chest port with catheter tip into the SVC.  6.  No features to suggest mass.  7.  No air in the soft tissues.     This report was finalized on 5/17/2023 2:48 AM by Shorty Wei MD.           I have personally reviewed the above radiology results.     Last Echocardiogram:  Results for orders placed during the hospital encounter of 12/17/21    Adult Transthoracic Echo Complete w/ Color, Spectral and Contrast if necessary per protocol    Interpretation Summary  · Left ventricular wall thickness is consistent with severe concentric hypertrophy.  · Left ventricular ejection fraction appears to be 61 - 65%. Left ventricular systolic function is normal.  · Left ventricular diastolic function is consistent with  (grade II w/high LAP) pseudonormalization.  · Moderate to severe mitral valve regurgitation is present with an eccentric jet noted.    ---------------------------------------------------------------------------------------------------------------------    Assessment & Plan      Active Hospital Problems    Diagnosis  POA   • **Sepsis, due to unspecified organism, unspecified whether acute organ dysfunction present [A41.9]  Yes     · Sepsis likely secondary to cellulitis surrounding dialysis port, POA  · ESRD on dialysis, POA  · Patient does meet sepsis criteria at this time. Continue to monitor vital signs. HR >90 WBC >12, and symptoms of infection surrounding dialysis port  · Patient reports serosanguineous and purulent material draining around insertion site of port.  Also notes redness and warmth around port.   · Patient given IV vancomycin in the ED  · Blood cultures obtained in the ED  · Continue IV vancomycin   · Can obtain wound culture if site begins to actively drain on admission  · Recheck labs in the am  · Continue to monitor vital signs closely  · Can consult nephrology to facilitate hemodialysis while admitted   CHRONIC MEDICAL PROBLEMS    Essential hypertension  BP appears well controlled   Plan to resume home antihypertensive regimen once reconciled per pharmacy with appropriate holding parameters to prevent hypotension and/or bradycardia   Closely monitor BP per hospital protocol, titrate medications as necessary  · COPD/asthma  · CHF  · CAD  · KATHLEEN  · Paroxysmal atrial fibrillation chronically anticoagulated on Eliquis  · Morbid obesity  · Arthritis  · Restart home medications per med rec  · Supportive care    · F/E/N: No IV fluids at this time.  Continue to monitor electrolytes and replace as indicated.  Renal diet.    ---------------------------------------------------  DVT Prophylaxis: Home anticoagulation  GI Prophylaxis: Restart home PPI  Activity: Up with assistance    The patient is considered  to be a high risk patient due to: ESRD on HD, sepsis Madyson secondary to cellulitis around port    INPATIENT status due to the need for care which can only be reasonably provided in an hospital setting such as aggressive/expedited ancillary services and/or consultation services, the necessity for IV medications, close physician monitoring and/or the possible need for procedures.  In such, I feel patient’s risk for adverse outcomes and need for care warrant INPATIENT evaluation and predict the patient’s care encounter to likely last beyond 2 midnights.      Code Status: Full code    Disposition/Discharge planning: Pending clinical course    I have discussed the patient's assessment and plan with Dr. Auguste.      Sylvia Gill PA-C  Hospitalist Service -- [facility]       05/17/23  03:47 EDT    Attending Physician: Natali Auguste DO        Electronically signed by Natali Auguste DO at 05/17/23 0659          Emergency Department Notes      Jaquan Casas MD at 05/17/23 0002            [facility]  emergency department encounter        Pt Name: Sudarshan Keene  MRN: [id number]  Birthdate 1975  Date of evaluation: 5/17/2023    Chief Complaint   Patient presents with   • Vascular Access Problem             HISTORY OF PRESENT ILLNESS:   Sudarshan Keene is a 48 y.o. male PMH HTN, COPD/asthma, CAD, CHF, arthritis, KATHLEEN, morbid obesity, paroxysmal atrial fibrillation chronically anticoagulated, ESRD on dialysis.  Dialyzed Tuesday Thursday Saturday, last dialyzed yesterday.    Patient had tunneled dialysis catheter placed at [facility] by Dr. Laws on 4/22/23.    Patient presents with predominant concern for infected catheter in the left upper chest wall.  Patient reports symptom onset yesterday including pain around the port site, dizziness, nausea, vomiting, chills, decreased p.o. intake.  Patient was seen at [place] ED yesterday, they recommended he follow-up at dialysis  clinic.  Patient went to dialysis clinic where they perform dialysis but did not further investigate.  Patient called to follow-up with his vascular surgeon Dr. Laws, but did not hear back prompting patient to come here for evaluation.    Has fistula in the left antecubital that is few more weeks to maturity.  PCP: Marah Bain APRN          REVIEW OF SYSTEMS:     Review of Systems   Constitutional: Positive for appetite change and chills. Negative for fever.   HENT: Negative for congestion and rhinorrhea.    Eyes: Negative for visual disturbance.   Respiratory: Negative for cough and shortness of breath.    Cardiovascular: Negative for chest pain.   Gastrointestinal: Positive for nausea and vomiting. Negative for abdominal pain.   Genitourinary: Negative for dysuria.   Musculoskeletal: Negative for myalgias.   Skin: Negative for rash.   Neurological: Positive for dizziness. Negative for headaches.   Psychiatric/Behavioral: Negative for confusion.       Review of systems otherwise as per HPI.          PREVIOUS HISTORY:         Past Medical History:   Diagnosis Date   • Arthritis    • Asthma    • CHF (congestive heart failure)    • COPD (chronic obstructive pulmonary disease)    • Coronary artery disease    • Hypertension    • Sleep apnea            Past Surgical History:   Procedure Laterality Date   • CARDIAC CATHETERIZATION  2008   • HERNIA REPAIR             Social History     Socioeconomic History   • Marital status: Single   Tobacco Use   • Smoking status: Former     Packs/day: 0.25     Types: Cigarettes     Quit date: 2014     Years since quittin.7   • Smokeless tobacco: Never   Substance and Sexual Activity   • Alcohol use: Never   • Drug use: Yes     Frequency: 7.0 times per week     Types: Marijuana     Comment: tobacco free   • Sexual activity: Yes     Partners: Female           Family History   Problem Relation Age of Onset   • Hypertension Mother    • Hypertension  "Father    • Hypertension Sister    • Heart disease Sister          Current Outpatient Medications   Medication Instructions   • ALPRAZolam (XANAX) 1 mg, Oral, Nightly   • amLODIPine (NORVASC) 10 mg, Oral, Daily   • apixaban (ELIQUIS) 5 mg, Oral, 2 Times Daily   • atorvastatin (LIPITOR) 40 mg, Oral, Nightly   • bumetanide (BUMEX) 1 mg, Oral, Daily   • buPROPion XL (WELLBUTRIN XL) 300 mg, Oral, Every Morning   • cloNIDine (CATAPRES) 0.3 mg, Oral, 3 Times Daily   • ezetimibe (ZETIA) 10 mg, Oral, Daily   • ferrous sulfate 325 mg, Oral, Daily With Breakfast   • isosorbide mononitrate (IMDUR) 120 mg, Oral, Daily   • labetalol (NORMODYNE) 300 mg, Oral, 3 Times Daily   • minoxidil (LONITEN) 10 mg, Oral, Nightly   • montelukast (SINGULAIR) 10 mg, Oral, Nightly   • predniSONE (DELTASONE) 5 mg, Oral, Daily   • tamsulosin (FLOMAX) 0.4 MG capsule 24 hr capsule 1 capsule, Oral, Daily         Allergies:  Patient has no known allergies.          PHYSICAL EXAM:     Patient Vitals for the past 24 hrs:   BP Temp Temp src Pulse Resp SpO2 Height Weight   05/16/23 2321 135/71 98.8 °F (37.1 °C) Infrared 66 19 98 % 175.3 cm (69\") 116 kg (255 lb)       Physical Exam  Vitals and nursing note reviewed.   Constitutional:       General: He is not in acute distress.     Appearance: Normal appearance.   HENT:      Head: Normocephalic and atraumatic.   Eyes:      Extraocular Movements: Extraocular movements intact.      Conjunctiva/sclera: Conjunctivae normal.   Cardiovascular:      Rate and Rhythm: Normal rate.      Comments: Palpable thrill in the left AC fistula.  Left chest wall tunneled dialysis catheter with possible mild purulence versus granulation tissue.  Patient endorses tenderness around the site.  Unable to recognize as if erythema is present due to dark skin.  Pulmonary:      Effort: Pulmonary effort is normal. No respiratory distress.   Abdominal:      General: Abdomen is flat. There is no distension.   Musculoskeletal:         " General: No deformity. Normal range of motion.      Cervical back: Normal range of motion. No rigidity.   Skin:     Coloration: Skin is not jaundiced.      Findings: No rash.   Neurological:      General: No focal deficit present.      Mental Status: He is alert and oriented to person, place, and time.   Psychiatric:         Mood and Affect: Mood normal.         Behavior: Behavior normal.             COMPLETED DIAGNOSTIC STUDIES AND INTERVENTIONS:     Lab Results (last 24 hours)     Procedure Component Value Units Date/Time    Blood Culture - Blood, Arm, Right [[id number]] Collected: 05/17/23 0025    Specimen: Blood from Arm, Right Updated: 05/17/23 0028    Lactic Acid, Plasma [[id number]]  (Normal) Collected: 05/17/23 0053    Specimen: Blood, Central Line Updated: 05/17/23 0121     Lactate 0.7 mmol/L     Blood Culture - Blood, Blood, Central Line [[id number]] Collected: 05/17/23 0053    Specimen: Blood, Central Line Updated: 05/17/23 0100    CBC & Differential [[id number]]  (Abnormal) Collected: 05/17/23 0053    Specimen: Blood Updated: 05/17/23 0103    Narrative:      The following orders were created for panel order CBC & Differential.  Procedure                               Abnormality         Status                     ---------                               -----------         ------                     CBC Auto Differential[[id number]]        Abnormal            Final result                 Please view results for these tests on the individual orders.    Comprehensive Metabolic Panel [[id number]]  (Abnormal) Collected: 05/17/23 0053    Specimen: Blood Updated: 05/17/23 0125     Glucose 83 mg/dL      BUN 58 mg/dL      Creatinine 10.65 mg/dL      Sodium 135 mmol/L      Potassium 5.1 mmol/L      Comment: Slight hemolysis detected by analyzer. Results may be affected.        Chloride 99 mmol/L      CO2 21.5 mmol/L      Calcium 9.1 mg/dL      Total Protein 6.6 g/dL      Albumin 3.6 g/dL      ALT (SGPT) 6 U/L      AST  (SGOT) 14 U/L      Alkaline Phosphatase 52 U/L      Total Bilirubin 0.4 mg/dL      Globulin 3.0 gm/dL      A/G Ratio 1.2 g/dL      BUN/Creatinine Ratio 5.4     Anion Gap 14.5 mmol/L      eGFR 5.4 mL/min/1.73      Comment: <15 Indicative of kidney failure       Narrative:      GFR Normal >60  Chronic Kidney Disease <60  Kidney Failure <15      Protime-INR [[id number]]  (Abnormal) Collected: 05/17/23 0053    Specimen: Blood Updated: 05/17/23 0123     Protime 15.5 Seconds      INR 1.17    Narrative:      Suggested INR therapeutic range for stable oral anticoagulant therapy:    Low Intensity therapy:   1.5-2.0  Moderate Intensity therapy:   2.0-3.0  High Intensity therapy:   2.5-4.0    aPTT [[id number]]  (Normal) Collected: 05/17/23 0053    Specimen: Blood Updated: 05/17/23 0123     PTT 30.8 seconds     Narrative:      PTT Heparin Therapeutic Range:  59 - 95 seconds      C-reactive Protein [[id number]]  (Abnormal) Collected: 05/17/23 0053    Specimen: Blood Updated: 05/17/23 0125     C-Reactive Protein 11.81 mg/dL     Sedimentation Rate [[id number]]  (Abnormal) Collected: 05/17/23 0053    Specimen: Blood Updated: 05/17/23 0135     Sed Rate 26 mm/hr     CBC Auto Differential [[id number]]  (Abnormal) Collected: 05/17/23 0053    Specimen: Blood Updated: 05/17/23 0103     WBC 14.09 10*3/mm3      RBC 3.87 10*6/mm3      Hemoglobin 12.2 g/dL      Hematocrit 38.6 %      MCV 99.7 fL      MCH 31.5 pg      MCHC 31.6 g/dL      RDW 16.5 %      RDW-SD 59.2 fl      MPV 9.8 fL      Platelets 150 10*3/mm3      Neutrophil % 86.5 %      Lymphocyte % 2.9 %      Monocyte % 9.0 %      Eosinophil % 0.7 %      Basophil % 0.3 %      Immature Grans % 0.6 %      Neutrophils, Absolute 12.18 10*3/mm3      Lymphocytes, Absolute 0.41 10*3/mm3      Monocytes, Absolute 1.27 10*3/mm3      Eosinophils, Absolute 0.10 10*3/mm3      Basophils, Absolute 0.04 10*3/mm3      Immature Grans, Absolute 0.09 10*3/mm3      nRBC 0.0 /100 WBC     COVID PRE-OP /  PRE-PROCEDURE SCREENING ORDER (NO ISOLATION) - Swab, Nasopharynx [[id number]]  (Normal) Collected: 05/17/23 0104    Specimen: Swab from Nasopharynx Updated: 05/17/23 0127    Narrative:      The following orders were created for panel order COVID PRE-OP / PRE-PROCEDURE SCREENING ORDER (NO ISOLATION) - Swab, Nasopharynx.  Procedure                               Abnormality         Status                     ---------                               -----------         ------                     COVID-19 and FLU A/B PCR...[[id number]]  Normal              Final result                 Please view results for these tests on the individual orders.    COVID-19 and FLU A/B PCR - Swab, Nasopharynx [[id number]]  (Normal) Collected: 05/17/23 0104    Specimen: Swab from Nasopharynx Updated: 05/17/23 0127     COVID19 Not Detected     Influenza A PCR Not Detected     Influenza B PCR Not Detected    Narrative:      Fact sheet for providers: https://www.fda.gov/media/059017/download    Fact sheet for patients: https://www.fda.gov/media/972278/download    Test performed by PCR.            XR Chest 2 View    (Results Pending)         New Medications Ordered This Visit   Medications   • AND Linked Order Group    • vancomycin IVPB 2000 mg in 0.9% Sodium Chloride (premix) 500 mL    • Pharmacy to dose vancomycin         Procedures            MEDICAL DECISION-MAKING AND ED COURSE:     ED Course as of 05/17/23 0147   Wed May 17, 2023   0019 48-year-old male with tunneled dialysis catheter presents concern for infection based on systemic symptoms and increasing pain at the catheter site.  Will check inflammatory markers, peripheral blood cultures x2, blood culture from catheter.  No tachycardia, no indication of sepsis at this time. [KP]   0116 WBC(!): 14.09 [KP]   0116 Hemoglobin(!): 12.2 [KP]   0136 EKG at 1:27 AM sinus tachycardia 104 bpm, , , QTc 433, regular axis, no STEMI. [KP]   0136 C-Reactive Protein(!): 11.81 [KP]   0136  With heart rate greater than 100, patient is septic.  Vancomycin ordered. [KP]   0137 Sed Rate(!): 26 [KP]   0146 Case discussed with and admit to hospitalist Dr. Auguste. []      ED Course User Index  [KP] Jaquan Casas MD       ?      FINAL IMPRESSION:       1. Sepsis, due to unspecified organism, unspecified whether acute organ dysfunction present    2. Infection of hemodialysis catheter, initial encounter         The complaints listed here are new problems to this examiner.       Medication List      No changes were made to your prescriptions during this visit.         FOLLOW-UP  No follow-up provider specified.    DISPOSITION  ED Disposition     ED Disposition   Decision to Admit    Condition   --    Comment   --                 Please note that portions of this note were completed with a voice recognition program.      Jaquan Casas MD  01:47 EDT  5/17/2023             Jaquan Casas MD  05/17/23 0146       Jaquan Casas MD  05/17/23 0147      Electronically signed by Jaquan Casas MD at 05/17/23 0147         Current Facility-Administered Medications   Medication Dose Route Frequency Provider Last Rate Last Admin   • acetaminophen (TYLENOL) tablet 650 mg  650 mg Oral Q6H PRN Sylvia Gill PA-C   650 mg at 05/17/23 0421   • albuterol (PROVENTIL) nebulizer solution 0.083% 2.5 mg/3mL  2.5 mg Nebulization Q4H PRN Sylvia Gill PA-C       • ALPRAZolam (XANAX) tablet 2 mg  2 mg Oral Nightly Natali Auguste,        • amLODIPine (NORVASC) tablet 10 mg  10 mg Oral Daily Sylvia Gill PA-C       • atorvastatin (LIPITOR) tablet 40 mg  40 mg Oral Nightly Sylvia Gill PA-C       • budesonide-formoterol (SYMBICORT) 160-4.5 MCG/ACT inhaler 2 puff  2 puff Inhalation BID - RT Sylvia Gill PA-C   2 puff at 05/17/23 0636   • bumetanide (BUMEX) tablet 2 mg  2 mg Oral Daily Sylvia Gill PA-C       • buPROPion XL (WELLBUTRIN XL) 24 hr tablet 300 mg  300 mg Oral QAM Sylvia Gill PA-C    300 mg at 05/17/23 0938   • carvedilol (COREG) tablet 25 mg  25 mg Oral BID With Meals Natali Auguste DO       • [START ON 5/18/2023] cefTAZidime (FORTAZ) 1 g in sodium chloride 0.9 % 100 mL IVPB  1 g Intravenous Q24H Natali Auguste DO       • cloNIDine (CATAPRES) tablet 0.3 mg  0.3 mg Oral TID Natali Auguste DO       • isosorbide mononitrate (IMDUR) 24 hr tablet 120 mg  120 mg Oral Daily Natali Auguste DO       • linaclotide (LINZESS) capsule 145 mcg  145 mcg Oral QAM AC Sylvia Gill PA-C       • metOLazone (ZAROXOLYN) tablet 10 mg  10 mg Oral Daily Sylvia Gill PA-C       • montelukast (SINGULAIR) tablet 10 mg  10 mg Oral Nightly Sylvia Gill PA-C       • nitroglycerin (NITROSTAT) SL tablet 0.4 mg  0.4 mg Sublingual Q5 Min PRN Natali Auguste DO       • ondansetron ODT (ZOFRAN-ODT) disintegrating tablet 4 mg  4 mg Oral Q6H PRN Sylvia Gill PA-C       • pantoprazole (PROTONIX) EC tablet 40 mg  40 mg Oral Daily Sylvia Gill PA-C   40 mg at 05/17/23 0938   • rOPINIRole (REQUIP) tablet 0.25 mg  0.25 mg Oral TID Sylvia Gill PA-C   0.25 mg at 05/17/23 0938   • sodium chloride 0.9 % flush 10 mL  10 mL Intravenous Q12H Natali Auguste DO   10 mL at 05/17/23 0938   • sodium chloride 0.9 % flush 10 mL  10 mL Intravenous PRN Natali Auguste DO       • sodium chloride 0.9 % infusion 40 mL  40 mL Intravenous PRN Natlai Auguste DO       • tamsulosin (FLOMAX) 24 hr capsule 0.4 mg  0.4 mg Oral Daily Sylvia Gill PA-C   0.4 mg at 05/17/23 0938   • Vancomycin Pharmacy Intermittent/Pulse Dosing   Does not apply Daily Jaquan Casas MD           Lab Results (last 24 hours)     Procedure Component Value Units Date/Time    Comprehensive Metabolic Panel [[id number]]  (Abnormal) Collected: 05/17/23 0316    Specimen: Blood Updated: 05/17/23 0408     Glucose 70 mg/dL      BUN 59 mg/dL      Creatinine 10.19 mg/dL      Sodium 133 mmol/L      Potassium 5.2  mmol/L      Chloride 98 mmol/L      CO2 19.5 mmol/L      Calcium 9.2 mg/dL      Total Protein 7.0 g/dL      Albumin 3.7 g/dL      ALT (SGPT) 7 U/L      AST (SGOT) 13 U/L      Alkaline Phosphatase 55 U/L      Total Bilirubin 0.4 mg/dL      Globulin 3.3 gm/dL      A/G Ratio 1.1 g/dL      BUN/Creatinine Ratio 5.8     Anion Gap 15.5 mmol/L      eGFR 5.7 mL/min/1.73      Comment: <15 Indicative of kidney failure       Narrative:      GFR Normal >60  Chronic Kidney Disease <60  Kidney Failure <15      CBC & Differential [[id number]]  (Abnormal) Collected: 05/17/23 0316    Specimen: Blood Updated: 05/17/23 0340    Narrative:      The following orders were created for panel order CBC & Differential.  Procedure                               Abnormality         Status                     ---------                               -----------         ------                     CBC Auto Differential[[id number]]        Abnormal            Final result                 Please view results for these tests on the individual orders.    CBC Auto Differential [[id number]]  (Abnormal) Collected: 05/17/23 0316    Specimen: Blood Updated: 05/17/23 0340     WBC 12.81 10*3/mm3      RBC 4.07 10*6/mm3      Hemoglobin 12.8 g/dL      Hematocrit 40.4 %      MCV 99.3 fL      MCH 31.4 pg      MCHC 31.7 g/dL      RDW 16.4 %      RDW-SD 59.6 fl      MPV 9.8 fL      Platelets 155 10*3/mm3      Neutrophil % 89.9 %      Lymphocyte % 2.6 %      Monocyte % 6.1 %      Eosinophil % 0.6 %      Basophil % 0.2 %      Immature Grans % 0.6 %      Neutrophils, Absolute 11.52 10*3/mm3      Lymphocytes, Absolute 0.33 10*3/mm3      Monocytes, Absolute 0.78 10*3/mm3      Eosinophils, Absolute 0.08 10*3/mm3      Basophils, Absolute 0.02 10*3/mm3      Immature Grans, Absolute 0.08 10*3/mm3      nRBC 0.0 /100 WBC     Blood Culture - Blood, Arm, Left [[id number]] Collected: 05/17/23 0317    Specimen: Blood from Arm, Left Updated: 05/17/23 0338    [place] Draw [[id number]]  Collected: 05/17/23 0053    Specimen: Blood Updated: 05/17/23 0201    Narrative:      The following orders were created for panel order [place] Draw.  Procedure                               Abnormality         Status                     ---------                               -----------         ------                     Green Top (Gel)[[id number]]                                  Final result               Lavender Top[[id number]]                                     Final result               Gold Top - SST[[id number]]                                   Final result               Light Blue Top[[id number]]                                   Final result                 Please view results for these tests on the individual orders.    Green Top (Gel) [[id number]] Collected: 05/17/23 0053    Specimen: Blood Updated: 05/17/23 0201     Extra Tube Hold for add-ons.     Comment: Auto resulted.       Gold Top - SST [[id number]] Collected: 05/17/23 0053    Specimen: Blood Updated: 05/17/23 0201     Extra Tube Hold for add-ons.     Comment: Auto resulted.       Lavender Top [[id number]] Collected: 05/17/23 0053    Specimen: Blood Updated: 05/17/23 0201     Extra Tube hold for add-on     Comment: Auto resulted       Light Blue Top [[id number]] Collected: 05/17/23 0053    Specimen: Blood Updated: 05/17/23 0201     Extra Tube Hold for add-ons.     Comment: Auto resulted       Sedimentation Rate [[id number]]  (Abnormal) Collected: 05/17/23 0053    Specimen: Blood Updated: 05/17/23 0135     Sed Rate 26 mm/hr     COVID PRE-OP / PRE-PROCEDURE SCREENING ORDER (NO ISOLATION) - Swab, Nasopharynx [[id number]]  (Normal) Collected: 05/17/23 0104    Specimen: Swab from Nasopharynx Updated: 05/17/23 0127    Narrative:      The following orders were created for panel order COVID PRE-OP / PRE-PROCEDURE SCREENING ORDER (NO ISOLATION) - Swab, Nasopharynx.  Procedure                               Abnormality         Status                     ---------                                -----------         ------                     COVID-19 and FLU A/B PCR...[[id number]]  Normal              Final result                 Please view results for these tests on the individual orders.    COVID-19 and FLU A/B PCR - Swab, Nasopharynx [[id number]]  (Normal) Collected: 05/17/23 0104    Specimen: Swab from Nasopharynx Updated: 05/17/23 0127     COVID19 Not Detected     Influenza A PCR Not Detected     Influenza B PCR Not Detected    Narrative:      Fact sheet for providers: https://www.fda.gov/media/990883/download    Fact sheet for patients: https://www.fda.gov/media/740307/download    Test performed by PCR.    Comprehensive Metabolic Panel [[id number]]  (Abnormal) Collected: 05/17/23 0053    Specimen: Blood Updated: 05/17/23 0125     Glucose 83 mg/dL      BUN 58 mg/dL      Creatinine 10.65 mg/dL      Sodium 135 mmol/L      Potassium 5.1 mmol/L      Comment: Slight hemolysis detected by analyzer. Results may be affected.        Chloride 99 mmol/L      CO2 21.5 mmol/L      Calcium 9.1 mg/dL      Total Protein 6.6 g/dL      Albumin 3.6 g/dL      ALT (SGPT) 6 U/L      AST (SGOT) 14 U/L      Alkaline Phosphatase 52 U/L      Total Bilirubin 0.4 mg/dL      Globulin 3.0 gm/dL      A/G Ratio 1.2 g/dL      BUN/Creatinine Ratio 5.4     Anion Gap 14.5 mmol/L      eGFR 5.4 mL/min/1.73      Comment: <15 Indicative of kidney failure       Narrative:      GFR Normal >60  Chronic Kidney Disease <60  Kidney Failure <15      C-reactive Protein [[id number]]  (Abnormal) Collected: 05/17/23 0053    Specimen: Blood Updated: 05/17/23 0125     C-Reactive Protein 11.81 mg/dL     Protime-INR [[id number]]  (Abnormal) Collected: 05/17/23 0053    Specimen: Blood Updated: 05/17/23 0123     Protime 15.5 Seconds      INR 1.17    Narrative:      Suggested INR therapeutic range for stable oral anticoagulant therapy:    Low Intensity therapy:   1.5-2.0  Moderate Intensity therapy:   2.0-3.0  High Intensity therapy:    2.5-4.0    aPTT [[id number]]  (Normal) Collected: 05/17/23 0053    Specimen: Blood Updated: 05/17/23 0123     PTT 30.8 seconds     Narrative:      PTT Heparin Therapeutic Range:  59 - 95 seconds      Lactic Acid, Plasma [[id number]]  (Normal) Collected: 05/17/23 0053    Specimen: Blood, Central Line Updated: 05/17/23 0121     Lactate 0.7 mmol/L     CBC & Differential [[id number]]  (Abnormal) Collected: 05/17/23 0053    Specimen: Blood Updated: 05/17/23 0103    Narrative:      The following orders were created for panel order CBC & Differential.  Procedure                               Abnormality         Status                     ---------                               -----------         ------                     CBC Auto Differential[[id number]]        Abnormal            Final result                 Please view results for these tests on the individual orders.    CBC Auto Differential [[id number]]  (Abnormal) Collected: 05/17/23 0053    Specimen: Blood Updated: 05/17/23 0103     WBC 14.09 10*3/mm3      RBC 3.87 10*6/mm3      Hemoglobin 12.2 g/dL      Hematocrit 38.6 %      MCV 99.7 fL      MCH 31.5 pg      MCHC 31.6 g/dL      RDW 16.5 %      RDW-SD 59.2 fl      MPV 9.8 fL      Platelets 150 10*3/mm3      Neutrophil % 86.5 %      Lymphocyte % 2.9 %      Monocyte % 9.0 %      Eosinophil % 0.7 %      Basophil % 0.3 %      Immature Grans % 0.6 %      Neutrophils, Absolute 12.18 10*3/mm3      Lymphocytes, Absolute 0.41 10*3/mm3      Monocytes, Absolute 1.27 10*3/mm3      Eosinophils, Absolute 0.10 10*3/mm3      Basophils, Absolute 0.04 10*3/mm3      Immature Grans, Absolute 0.09 10*3/mm3      nRBC 0.0 /100 WBC     Blood Culture - Blood, Blood, Central Line [[id number]] Collected: 05/17/23 0053    Specimen: Blood, Central Line Updated: 05/17/23 0100    Blood Culture - Blood, Arm, Right [[id number]] Collected: 05/17/23 0025    Specimen: Blood from Arm, Right Updated: 05/17/23 0028

## 2023-05-17 NOTE — H&P
[facility] Medicine Services  HISTORY & PHYSICAL    Patient Identification:  Name:  Sudarshan Keene  Age:  48 y.o.  Sex:  male  :  1975  MRN:  [id number]   Visit Number:  [id number]  Admit Date: 2023   Primary Care Physician:  Marah Bain APRN     Subjective     Chief complaint:   Chief Complaint   Patient presents with   • Vascular Access Problem     History of presenting illness:   Patient is a 48 y.o. male with past medical history significant for hypertension, COPD/asthma, CAD, CHF, arthritis, KATHLEEN, morbid obesity, paroxysmal atrial fibrillation chronically anticoagulated, ESRD on dialysis that presented to the [facility] emergency department for evaluation of concerns of an infected dialysis catheter.  States he had a tunneled dialysis catheter placed at [facility] by Dr. Bennett on 2023.  Patient states he has had pain around the port site, dizziness, nausea, vomiting, chills, decreased intake.  Patient states he went to [facility] ED yesterday and they recommended follow-up at dialysis clinic.  Patient went to dialysis clinic where they performed dialysis but did not further get investigate.  Patient gets dialyzed every  and Saturday.  Patient called to follow-up with his vascular surgeon Dr. Bennett but did not hear back therefore he came to the ED today for evaluation.  On my exam patient resting comfortably in bed.  Patient states he began noticing the area around his port, painful approximately 2 days ago.  Patient also notes that it has been draining serosanguineous fluid and pus around the insertion point.  He states it has been enough to saturate the pad around the base of the port.  Patient states he has had a port get infected in the past and this is similar to what happened then.  He states he was hospitalized in [place] at that time.  Patient states he has also felt feverish and chilled, but has not  taken his temperature.  Patient also complains of fatigue and nausea.  Patient states he was hospitalized at [facility] around 1 month ago.    Upon arrival to the ED, vitals were temperature 98.8, HR 66, RR 19, /71, SPO2 98% on room air.  Labs significant for creatinine 10.65, BUN 58, BUN/creatinine ratio 5.4, GFR 5.4.  WBC 14.09 neutrophil % 86.5, absolute neutrophils 12.18, sed rate 26, CRP 11.81.      Chest x-ray shows mildly enlarged heart size.  No lobar consolidation or edema.  No pleural effusion or pneumothorax.  Left neck central vascular catheter with tip in SVC/RA junction.  Left anterior chest port with catheter tip into the SVC.  No features to suggest mass.  No air in soft tissues.    Patient has been admitted to the telemetry unit.  ---------------------------------------------------------------------------------------------------------------------   Review of Systems   Constitutional: Positive for chills and fatigue. Negative for diaphoresis and fever.   Respiratory: Negative for cough, shortness of breath and wheezing.    Cardiovascular: Negative for chest pain, palpitations and leg swelling.   Gastrointestinal: Negative for abdominal pain, constipation, diarrhea, nausea and vomiting.   Genitourinary: Negative for flank pain and hematuria.   Musculoskeletal: Negative for arthralgias, myalgias and neck stiffness.   Skin: Negative for rash and wound.        Erythema and purulent drainage around port site   Neurological: Negative for dizziness, weakness and light-headedness.   Psychiatric/Behavioral: Negative for agitation and confusion.      ---------------------------------------------------------------------------------------------------------------------   Past Medical History:   Diagnosis Date   • Arthritis    • Asthma    • CHF (congestive heart failure)    • COPD (chronic obstructive pulmonary disease)    • Coronary artery disease    • Hypertension    • Sleep apnea      Past Surgical  History:   Procedure Laterality Date   • CARDIAC CATHETERIZATION  2008   • HERNIA REPAIR  1982     Family History   Problem Relation Age of Onset   • Hypertension Mother    • Hypertension Father    • Hypertension Sister    • Heart disease Sister      Social History     Socioeconomic History   • Marital status: Single   Tobacco Use   • Smoking status: Former     Packs/day: 0.25     Types: Cigarettes     Quit date: 2014     Years since quittin.7     Passive exposure: Never   • Smokeless tobacco: Never   Vaping Use   • Vaping Use: Never used   Substance and Sexual Activity   • Alcohol use: Never   • Drug use: Yes     Frequency: 7.0 times per week     Types: Marijuana     Comment: tobacco free   • Sexual activity: Yes     Partners: Female     ---------------------------------------------------------------------------------------------------------------------   Allergies:  Patient has no known allergies.  ---------------------------------------------------------------------------------------------------------------------   Medications below are reported home medications pulling from within the system; at this time, these medications have not been reconciled unless otherwise specified and are in the verification process for further verifcation as current home medications.    Prior to Admission Medications     Prescriptions Last Dose Informant Patient Reported? Taking?    albuterol sulfate  (90 Base) MCG/ACT inhaler 2023 Self, Other Yes Yes    Inhale 2 puffs Every 4 (Four) Hours As Needed for Wheezing.    ALPRAZolam (XANAX) 1 MG tablet 2023 Self, Other Yes Yes    Take 2 tablets by mouth Every Night.    amLODIPine (NORVASC) 10 MG tablet 2023 Self, Other Yes Yes    Take 1 tablet by mouth Daily.    apixaban (ELIQUIS) 5 MG tablet tablet 2023 Self, Other Yes Yes    Take 1 tablet by mouth 2 (Two) Times a Day.    atorvastatin (LIPITOR) 40 MG tablet 2023 Self, Other Yes Yes    Take 1  tablet by mouth Every Night.    budesonide-formoterol (SYMBICORT) 160-4.5 MCG/ACT inhaler 5/16/2023 Self, Other Yes Yes    Inhale 2 puffs 2 (Two) Times a Day.    bumetanide (BUMEX) 2 MG tablet 5/16/2023 Self, Other Yes Yes    Take 1 tablet by mouth Daily.    buPROPion XL (WELLBUTRIN XL) 300 MG 24 hr tablet 5/16/2023 Self, Other Yes Yes    Take 1 tablet by mouth Every Morning.    carvedilol (COREG) 25 MG tablet 5/16/2023 Self, Other Yes Yes    Take 1 tablet by mouth 2 (Two) Times a Day With Meals.    cloNIDine (CATAPRES) 0.3 MG tablet 5/16/2023 Self, Other Yes Yes    Take 1 tablet by mouth 3 (Three) Times a Day.    ezetimibe (ZETIA) 10 MG tablet 5/16/2023 Self, Other Yes Yes    Take 1 tablet by mouth Daily.    isosorbide mononitrate (IMDUR) 120 MG 24 hr tablet 5/16/2023 Self, Other Yes Yes    Take 1 tablet by mouth Daily.    linaclotide (LINZESS) 145 MCG capsule capsule 5/16/2023 Self, Other Yes Yes    Take 1 capsule by mouth Every Morning Before Breakfast.    metOLazone (ZAROXOLYN) 10 MG tablet 5/16/2023 Self, Other Yes Yes    Take 1 tablet by mouth Daily.    montelukast (SINGULAIR) 10 MG tablet 5/16/2023 Self, Other Yes Yes    Take 1 tablet by mouth Every Night.    ondansetron (ZOFRAN) 8 MG tablet 5/16/2023 Self, Other Yes Yes    Take 1 tablet by mouth Every 8 (Eight) Hours As Needed for Nausea or Vomiting.    pantoprazole (PROTONIX) 40 MG EC tablet 5/16/2023 Self, Other Yes Yes    Take 1 tablet by mouth Daily.    rOPINIRole (REQUIP) 0.25 MG tablet 5/16/2023 Self, Other Yes Yes    Take 1 tablet by mouth 3 (Three) Times a Day.    tamsulosin (FLOMAX) 0.4 MG capsule 24 hr capsule 5/16/2023 Self, Other Yes Yes    Take 1 capsule by mouth Daily.        ---------------------------------------------------------------------------------------------------------------------    Objective     Hospital Scheduled Meds:  heparin (porcine), 5,000 Units, Subcutaneous, Q12H  sodium chloride, 10 mL, Intravenous, Q12H  vancomycin, 2,000  mg, Intravenous, Once  Vancomycin Pharmacy Intermittent/Pulse Dosing, , Does not apply, Daily           Current listed hospital scheduled medications may not yet reflect those currently placed in orders that are signed and held, awaiting patient's arrival to floor/unit.    ---------------------------------------------------------------------------------------------------------------------   Vital Signs:  Temp:  [98.3 °F (36.8 °C)-98.8 °F (37.1 °C)] 98.3 °F (36.8 °C)  Heart Rate:  [66-99] 99  Resp:  [18-19] 18  BP: (135-140)/(71-89) 140/89  Mean Arterial Pressure (Non-Invasive) for the past 24 hrs (Last 3 readings):   Noninvasive MAP (mmHg)   05/17/23 0318 104     SpO2 Percentage    05/16/23 2321 05/17/23 0305 05/17/23 0318   SpO2: 98% 97% 98%     SpO2:  [97 %-98 %] 98 %  on   ;   Device (Oxygen Therapy): room air    Body mass index is 38.94 kg/m².  Wt Readings from Last 3 Encounters:   05/17/23 120 kg (263 lb 10.7 oz)   12/21/21 (!) 148 kg (326 lb)   08/04/20 (!) 166 kg (366 lb 11.2 oz)     ---------------------------------------------------------------------------------------------------------------------   Physical Exam:  Physical Exam  Constitutional:       General: He is not in acute distress.     Appearance: Normal appearance.   HENT:      Head: Normocephalic and atraumatic.      Right Ear: External ear normal.      Left Ear: External ear normal.      Nose: No nasal deformity.      Mouth/Throat:      Lips: Pink.      Mouth: Mucous membranes are moist.   Eyes:      Conjunctiva/sclera: Conjunctivae normal.      Pupils: Pupils are equal, round, and reactive to light.   Cardiovascular:      Rate and Rhythm: Normal rate and regular rhythm.      Pulses:           Dorsalis pedis pulses are 2+ on the right side and 2+ on the left side.      Heart sounds: Normal heart sounds.   Pulmonary:      Effort: Pulmonary effort is normal.      Breath sounds: Normal breath sounds. No wheezing, rhonchi or rales.   Abdominal:       General: Abdomen is flat. Bowel sounds are normal.      Palpations: Abdomen is soft.      Tenderness: There is no guarding or rebound.   Genitourinary:     Comments: No fajardo catheter in place   Musculoskeletal:      Cervical back: Neck supple. Normal range of motion.      Right lower leg: No edema.      Left lower leg: No edema.   Skin:     General: Skin is warm and dry.      Comments: Warmth around port site, maybe slight erythema but hard to observe due to deep skin tone. No active drainage around port site   Neurological:      General: No focal deficit present.      Mental Status: He is alert and oriented to person, place, and time.   Psychiatric:         Mood and Affect: Mood normal.         Behavior: Behavior normal.       ---------------------------------------------------------------------------------------------------------------------  EKG: EKG shows sinus tachycardia.  Awaiting confirmation by cardiology.      Telemetry:    Patient not yet on telemetry.      I have personally reviewed the EKG/Telemetry strip  ---------------------------------------------------------------------------------------------------------------------             Results from last 7 days   Lab Units 05/17/23  0316 05/17/23  0053   CRP mg/dL  --  11.81*   LACTATE mmol/L  --  0.7   WBC 10*3/mm3 12.81* 14.09*   HEMOGLOBIN g/dL 12.8* 12.2*   HEMATOCRIT % 40.4 38.6   MCV fL 99.3* 99.7*   MCHC g/dL 31.7 31.6   PLATELETS 10*3/mm3 155 150   INR   --  1.17*     Results from last 7 days   Lab Units 05/17/23  0053   SODIUM mmol/L 135*   POTASSIUM mmol/L 5.1   CHLORIDE mmol/L 99   CO2 mmol/L 21.5*   BUN mg/dL 58*   CREATININE mg/dL 10.65*   CALCIUM mg/dL 9.1   GLUCOSE mg/dL 83   ALBUMIN g/dL 3.6   BILIRUBIN mg/dL 0.4   ALK PHOS U/L 52   AST (SGOT) U/L 14   ALT (SGPT) U/L 6   Estimated Creatinine Clearance: 10.8 mL/min (A) (by C-G formula based on SCr of 10.65 mg/dL (H)).  No results found for: AMMONIA    No results found for: HGBA1C,  POCGLU  Lab Results   Component Value Date    HGBA1C 5.60 08/01/2020     Lab Results   Component Value Date    TSH 1.670 08/01/2020    FREET4 1.05 12/21/2015       No results found for: BLOODCX  No results found for: URINECX  No results found for: WOUNDCX  No results found for: STOOLCX  No results found for: RESPCX  No results found for: MRSACX  Pain Management Panel         Latest Ref Rng & Units 12/21/2021 12/19/2021   Pain Management Panel   Creatinine, Urine mg/dL 48.3   64.1                I have personally reviewed the above laboratory results.   ---------------------------------------------------------------------------------------------------------------------  Imaging Results (Last 7 Days)     Procedure Component Value Units Date/Time    XR Chest 2 View [[id number]] Collected: 05/17/23 0246     Updated: 05/17/23 0250    Narrative:      Procedure: X-ray examination of the chest performed on 05/17/2023. 2  views.     HISTORY: Infection in the neck. Evaluate chest.     FINDINGS:     Mildly enlarged heart size.  Left neck central vascular catheter with tip to the SVC/RA junction.  Left anterior chest port with catheter tip in the SVC. Lower  consolidation or edema.  No pleural effusion or pneumothorax.  Mild hypoinflated lungs.  No fracture or dislocation.       Impression:         1.  Mildly enlarged heart size.  2.  No lobar consolidation or edema.  3.  No pleural effusion or pneumothorax.  4.  Left neck central vascular catheter with tip to the SVC/RA junction.  5.  Left anterior chest port with catheter tip into the SVC.  6.  No features to suggest mass.  7.  No air in the soft tissues.     This report was finalized on 5/17/2023 2:48 AM by Shorty Wei MD.           I have personally reviewed the above radiology results.     Last Echocardiogram:  Results for orders placed during the hospital encounter of 12/17/21    Adult Transthoracic Echo Complete w/ Color, Spectral and Contrast if necessary per  protocol    Interpretation Summary  · Left ventricular wall thickness is consistent with severe concentric hypertrophy.  · Left ventricular ejection fraction appears to be 61 - 65%. Left ventricular systolic function is normal.  · Left ventricular diastolic function is consistent with (grade II w/high LAP) pseudonormalization.  · Moderate to severe mitral valve regurgitation is present with an eccentric jet noted.    ---------------------------------------------------------------------------------------------------------------------    Assessment & Plan      Active Hospital Problems    Diagnosis  POA   • **Sepsis, due to unspecified organism, unspecified whether acute organ dysfunction present [A41.9]  Yes     · Sepsis likely secondary to cellulitis surrounding dialysis port, POA  · ESRD on dialysis, POA  · Patient does meet sepsis criteria at this time. Continue to monitor vital signs. HR >90 WBC >12, and symptoms of infection surrounding dialysis port  · Patient reports serosanguineous and purulent material draining around insertion site of port.  Also notes redness and warmth around port.   · Patient given IV vancomycin in the ED  · Blood cultures obtained in the ED  · Continue IV vancomycin   · Can obtain wound culture if site begins to actively drain on admission  · Recheck labs in the am  · Continue to monitor vital signs closely  · Can consult nephrology to facilitate hemodialysis while admitted   CHRONIC MEDICAL PROBLEMS    Essential hypertension  BP appears well controlled   Plan to resume home antihypertensive regimen once reconciled per pharmacy with appropriate holding parameters to prevent hypotension and/or bradycardia   Closely monitor BP per hospital protocol, titrate medications as necessary  · COPD/asthma  · CHF  · CAD  · KATHLEEN  · Paroxysmal atrial fibrillation chronically anticoagulated on Eliquis  · Morbid obesity  · Arthritis  · Restart home medications per med rec  · Supportive care    · F/E/N:  No IV fluids at this time.  Continue to monitor electrolytes and replace as indicated.  Renal diet.    ---------------------------------------------------  DVT Prophylaxis: Home anticoagulation  GI Prophylaxis: Restart home PPI  Activity: Up with assistance    The patient is considered to be a high risk patient due to: ESRD on HD, sepsis Madyson secondary to cellulitis around port    INPATIENT status due to the need for care which can only be reasonably provided in an hospital setting such as aggressive/expedited ancillary services and/or consultation services, the necessity for IV medications, close physician monitoring and/or the possible need for procedures.  In such, I feel patient’s risk for adverse outcomes and need for care warrant INPATIENT evaluation and predict the patient’s care encounter to likely last beyond 2 midnights.      Code Status: Full code    Disposition/Discharge planning: Pending clinical course    I have discussed the patient's assessment and plan with Dr. Auguste.      Sylvia Gill PA-C  Hospitalist Service -- [facility]       05/17/23  03:47 EDT    Attending Physician: Natali Auguste DO

## 2023-05-17 NOTE — PROGRESS NOTES
Pharmacy was consulted to dose vancomycin for a port infection. Based on an estimated CrCl of 11.3mL/min, the vancomycin will be intermittently/pulse dosed based on levels following the initial 2g loading dose to target therapeutic AUC concentrations of 400-600mg/L.hr. Thank you for the consult. Pharmacy will continue to follow.     Thank you,   Apurva Cardoza, PharmD  04:40 EDT

## 2023-05-17 NOTE — CONSULTS
INFECTIOUS DISEASE CONSULTATION REPORT        Patient Identification:  Name:  Sudarshan Keene  Age:  48 y.o.  Sex:  male  :  1975  MRN:  [id number]   Visit Number:  [id number]  Primary Care Physician:  Marah Bain APRN        Referring Provider:  Dr. Mosquera    Reason for consult: Dialysis catheter infection       LOS: 0 days        Subjective       Subjective     History of present illness:      Thank you Dr. Mosquera for allowing us to participate in the care of your patient.  As you well know, Mr. Sudarshna Keene is a 48 y.o. male with past medical history significant for hypertension, COPD/asthma, CAD, CHF, arthritis, KATHLEEN, morbid obesity, paroxysmal A-fib chronically anticoagulated, ESR on dialysis, who presented to [facility] on 2023 for concerns for infected dialysis catheter.  Patient originally had tunneled dialysis catheter placed at [facility] by Dr. Bennett on 2023.  WBC improving at 12.81.  CRP elevated at 11.81.  Blood cultures from 2023 currently in process.  Lactic acid 0.7 on admission.  COVID-19 and influenza PCR negative.  Chest x-ray from 2023 reports mildly enlarged heart size, no lobar consolidation or edema, no pleural effusion or pneumothorax, left neck central vascular catheter with tip in the SVC/RA junction, left anterior chest port with catheter tip into the SVC.     Today patient is resting in bed.  Lungs clear to auscultation bilaterally.  Abdomen soft, nontender.  Left antecubital AV fistula that still lacks approximately 4 weeks to mature.  Left IJ dialysis catheter with blood and pus draining around the site. Plans to remove dialysis catheter and give patient a 72-hour holiday before inserting another dialysis catheter as AV fistula likes an additional 4 weeks to mature.       Infectious Disease consultation was requested for antimicrobial  management.      ---------------------------------------------------------------------------------------------------------------------     Review Of Systems:    Constitutional: no fever, chills and night sweats. No appetite change or unexpected weight change. No fatigue.  Eyes: no eye drainage, itching or redness.  HEENT: no mouth sores, dysphagia or nose bleed.  Respiratory: no for shortness of breath, cough or production of sputum.  Cardiovascular: no chest pain, no palpitations, no orthopnea.  Gastrointestinal: no nausea, vomiting or diarrhea. No abdominal pain, hematemesis or rectal bleeding.  Genitourinary: no dysuria or polyuria.  Hematologic/lymphatic: no lymph node abnormalities, no easy bruising or easy bleeding.  Musculoskeletal: no muscle or joint pain.  Skin: No rash and no itching.  Neurological: no loss of consciousness, no seizure, no headache.  Behavioral/Psych: no depression or suicidal ideation.  Endocrine: no hot flashes.  Immunologic: negative.    ---------------------------------------------------------------------------------------------------------------------     Past Medical History    Past Medical History:   Diagnosis Date    Arthritis     Asthma     CHF (congestive heart failure)     COPD (chronic obstructive pulmonary disease)     Coronary artery disease     Hypertension     Sleep apnea        Past Surgical History    Past Surgical History:   Procedure Laterality Date    CARDIAC CATHETERIZATION  2008    HERNIA REPAIR         Family History    Family History   Problem Relation Age of Onset    Hypertension Mother     Hypertension Father     Hypertension Sister     Heart disease Sister        Social History    Social History     Tobacco Use    Smoking status: Former     Packs/day: 0.25     Types: Cigarettes     Quit date: 2014     Years since quittin.7     Passive exposure: Never    Smokeless tobacco: Never   Vaping Use    Vaping Use: Never used   Substance Use Topics     Alcohol use: Never    Drug use: Yes     Frequency: 7.0 times per week     Types: Marijuana     Comment: tobacco free       Allergies    Patient has no known allergies.  ---------------------------------------------------------------------------------------------------------------------     Home Medications:    Prior to Admission Medications       Prescriptions Last Dose Informant Patient Reported? Taking?    albuterol sulfate  (90 Base) MCG/ACT inhaler 5/16/2023 Self, Other Yes Yes    Inhale 2 puffs Every 4 (Four) Hours As Needed for Wheezing.    ALPRAZolam (XANAX) 1 MG tablet 5/16/2023 Self, Other Yes Yes    Take 2 tablets by mouth Every Night.    amLODIPine (NORVASC) 10 MG tablet 5/16/2023 Self, Other Yes Yes    Take 1 tablet by mouth Daily.    apixaban (ELIQUIS) 5 MG tablet tablet 5/16/2023 Self, Other Yes Yes    Take 1 tablet by mouth 2 (Two) Times a Day.    atorvastatin (LIPITOR) 40 MG tablet 5/16/2023 Self, Other Yes Yes    Take 1 tablet by mouth Every Night.    budesonide-formoterol (SYMBICORT) 160-4.5 MCG/ACT inhaler 5/16/2023 Self, Other Yes Yes    Inhale 2 puffs 2 (Two) Times a Day.    bumetanide (BUMEX) 2 MG tablet 5/16/2023 Self, Other Yes Yes    Take 1 tablet by mouth Daily.    buPROPion XL (WELLBUTRIN XL) 300 MG 24 hr tablet 5/16/2023 Self, Other Yes Yes    Take 1 tablet by mouth Every Morning.    carvedilol (COREG) 25 MG tablet 5/16/2023 Self, Other Yes Yes    Take 1 tablet by mouth 2 (Two) Times a Day With Meals.    cloNIDine (CATAPRES) 0.3 MG tablet 5/16/2023 Self, Other Yes Yes    Take 1 tablet by mouth 3 (Three) Times a Day.    ezetimibe (ZETIA) 10 MG tablet 5/16/2023 Self, Other Yes Yes    Take 1 tablet by mouth Daily.    isosorbide mononitrate (IMDUR) 120 MG 24 hr tablet 5/16/2023 Self, Other Yes Yes    Take 1 tablet by mouth Daily.    linaclotide (LINZESS) 145 MCG capsule capsule 5/16/2023 Self, Other Yes Yes    Take 1 capsule by mouth Every Morning Before Breakfast.    metOLazone  (ZAROXOLYN) 10 MG tablet 5/16/2023 Self, Other Yes Yes    Take 1 tablet by mouth Daily.    montelukast (SINGULAIR) 10 MG tablet 5/16/2023 Self, Other Yes Yes    Take 1 tablet by mouth Every Night.    ondansetron (ZOFRAN) 8 MG tablet 5/16/2023 Self, Other Yes Yes    Take 1 tablet by mouth Every 8 (Eight) Hours As Needed for Nausea or Vomiting.    pantoprazole (PROTONIX) 40 MG EC tablet 5/16/2023 Self, Other Yes Yes    Take 1 tablet by mouth Daily.    rOPINIRole (REQUIP) 0.25 MG tablet 5/16/2023 Self, Other Yes Yes    Take 1 tablet by mouth 3 (Three) Times a Day.    tamsulosin (FLOMAX) 0.4 MG capsule 24 hr capsule 5/16/2023 Self, Other Yes Yes    Take 1 capsule by mouth Daily.          ---------------------------------------------------------------------------------------------------------------------    Objective       Objective     Hospital Scheduled Meds:  ALPRAZolam, 2 mg, Oral, Nightly  amLODIPine, 10 mg, Oral, Daily  atorvastatin, 40 mg, Oral, Nightly  budesonide-formoterol, 2 puff, Inhalation, BID - RT  bumetanide, 2 mg, Oral, Daily  buPROPion XL, 300 mg, Oral, QAM  carvedilol, 25 mg, Oral, BID With Meals  [START ON 5/18/2023] cefTAZidime, 1 g, Intravenous, Q24H  cloNIDine, 0.3 mg, Oral, TID  isosorbide mononitrate, 120 mg, Oral, Daily  linaclotide, 145 mcg, Oral, QAM AC  metOLazone, 10 mg, Oral, Daily  montelukast, 10 mg, Oral, Nightly  pantoprazole, 40 mg, Oral, Daily  rOPINIRole, 0.25 mg, Oral, TID  sodium chloride, 10 mL, Intravenous, Q12H  tamsulosin, 0.4 mg, Oral, Daily  Vancomycin Pharmacy Intermittent/Pulse Dosing, , Does not apply, Daily         ---------------------------------------------------------------------------------------------------------------------   Vital Signs:  Temp:  [98 °F (36.7 °C)-98.8 °F (37.1 °C)] 98 °F (36.7 °C)  Heart Rate:  [] 94  Resp:  [18-19] 18  BP: (134-140)/(71-89) 134/76  Mean Arterial Pressure (Non-Invasive) for the past 24 hrs (Last 3 readings):   Noninvasive MAP  (mmHg)   05/17/23 0638 95   05/17/23 0318 104     SpO2 Percentage    05/17/23 0458 05/17/23 0636 05/17/23 0638   SpO2: 98% 98% 91%     SpO2:  [91 %-98 %] 91 %  on   ;   Device (Oxygen Therapy): room air    Body mass index is 38.94 kg/m².  Wt Readings from Last 3 Encounters:   05/17/23 120 kg (263 lb 10.7 oz)   12/21/21 (!) 148 kg (326 lb)   08/04/20 (!) 166 kg (366 lb 11.2 oz)     ---------------------------------------------------------------------------------------------------------------------     Physical Exam:    Constitutional:  Well-developed and well-nourished.  No respiratory distress.      HENT:  Head: Normocephalic and atraumatic.  Mouth:  Moist mucous membranes.    Eyes:  Conjunctivae and EOM are normal.  No scleral icterus.  Neck:  Neck supple.  No JVD present.    Cardiovascular:  Normal rate, regular rhythm and normal heart sounds with no murmur. No edema.  Pulmonary/Chest:  No respiratory distress, no wheezes, no crackles, with normal breath sounds and good air movement.  Left IJ tunneled dialysis catheter with pus and blood draining from site.  Abdominal:  Soft.  Bowel sounds are normal.  No distension and no tenderness.   Musculoskeletal:  No edema, no tenderness, and no deformity.  No swelling or redness of joints.  Neurological:  Alert and oriented to person, place, and time.  No facial droop.  No slurred speech.   Skin:  Skin is warm and dry.  No rash noted.  No pallor.  Left AV fistula, not mature at this time.  Psychiatric:  Normal mood and affect.  Behavior is normal.    ---------------------------------------------------------------------------------------------------------------------              Results from last 7 days   Lab Units 05/17/23  0316 05/17/23  0053   CRP mg/dL  --  11.81*   LACTATE mmol/L  --  0.7   WBC 10*3/mm3 12.81* 14.09*   HEMOGLOBIN g/dL 12.8* 12.2*   HEMATOCRIT % 40.4 38.6   MCV fL 99.3* 99.7*   MCHC g/dL 31.7 31.6   PLATELETS 10*3/mm3 155 150   INR   --  1.17*      Results from last 7 days   Lab Units 05/17/23 0316 05/17/23  0053   SODIUM mmol/L 133* 135*   POTASSIUM mmol/L 5.2 5.1   CHLORIDE mmol/L 98 99   CO2 mmol/L 19.5* 21.5*   BUN mg/dL 59* 58*   CREATININE mg/dL 10.19* 10.65*   CALCIUM mg/dL 9.2 9.1   GLUCOSE mg/dL 70 83   ALBUMIN g/dL 3.7 3.6   BILIRUBIN mg/dL 0.4 0.4   ALK PHOS U/L 55 52   AST (SGOT) U/L 13 14   ALT (SGPT) U/L 7 6   Estimated Creatinine Clearance: 11.3 mL/min (A) (by C-G formula based on SCr of 10.19 mg/dL (H)).  No results found for: AMMONIA    No results found for: HGBA1C, POCGLU  Lab Results   Component Value Date    HGBA1C 5.60 08/01/2020     Lab Results   Component Value Date    TSH 1.670 08/01/2020    FREET4 1.05 12/21/2015       No results found for: BLOODCX  No results found for: URINECX  No results found for: WOUNDCX  No results found for: STOOLCX  No results found for: RESPCX  Pain Management Panel           Latest Ref Rng & Units 12/21/2021 12/19/2021   Pain Management Panel   Creatinine, Urine mg/dL 48.3   64.1                I have personally reviewed the above laboratory results.   ---------------------------------------------------------------------------------------------------------------------  Imaging Results (Last 7 Days)       Procedure Component Value Units Date/Time    XR Chest 2 View [[id number]] Collected: 05/17/23 0246     Updated: 05/17/23 0250    Narrative:      Procedure: X-ray examination of the chest performed on 05/17/2023. 2  views.     HISTORY: Infection in the neck. Evaluate chest.     FINDINGS:     Mildly enlarged heart size.  Left neck central vascular catheter with tip to the SVC/RA junction.  Left anterior chest port with catheter tip in the SVC. Lower  consolidation or edema.  No pleural effusion or pneumothorax.  Mild hypoinflated lungs.  No fracture or dislocation.       Impression:         1.  Mildly enlarged heart size.  2.  No lobar consolidation or edema.  3.  No pleural effusion or pneumothorax.  4.   Left neck central vascular catheter with tip to the SVC/RA junction.  5.  Left anterior chest port with catheter tip into the SVC.  6.  No features to suggest mass.  7.  No air in the soft tissues.     This report was finalized on 5/17/2023 2:48 AM by Shorty Wei MD.             I have personally reviewed the above radiology results.   ---------------------------------------------------------------------------------------------------------------------      Pertinent Infectious Disease Results        Assessment & Plan      Assessment        Tunneled dialysis catheter infection      Plan      I saw and examined the patient myself this morning and discussed the findings and plan of care with ALEX Rice and got report from primary RN and here are my findings:    Patient presents with concerns for an infected dialysis catheter. The patient had a tunneled dialysis catheter placed at [facility] by Dr. Bennett on 4/22/2023. The WBC count is improving at 12.81, and the CRP is elevated at 11.81. Blood cultures from 5/17/2023 are currently being processed. The lactic acid level on admission was 0.7. COVID-19 and influenza PCR tests were negative. The chest x-ray from 5/17/2023 shows a mildly enlarged heart size, no lobar consolidation or edema, and no pleural effusion or pneumothorax. The left neck central vascular catheter tip is in the SVC/RA junction, and the left anterior chest port catheter tip is in the SVC.    Today, the patient is resting in bed with clear lungs on auscultation bilaterally and a soft, nontender abdomen. The patient has a left antecubital AV fistula that still requires approximately 4 more weeks to mature. The left IJ dialysis catheter shows drainage of blood and pus around the site. The plan is to remove the dialysis catheter and give the patient a 72-hour holiday before inserting another dialysis catheter, as the AV fistula requires additional time to mature. For now, vancomycin  and ceftazidime was initiated and dosing adjusted by pharmacy.        ANTIMICROBIAL THERAPY    cefTAZidime (FORTAZ) IVPB in 100 mL  Vancomycin Pharmacy Intermittent/Pulse Dosing       Again, thank you Dr. Mosquera for allowing us to participate in the care of your patient and please feel free to call for any questions you may have.        Code Status:     Code Status and Medical Interventions:   Ordered at: 05/17/23 0152     Code Status (Patient has no pulse and is not breathing):    CPR (Attempt to Resuscitate)     Medical Interventions (Patient has pulse or is breathing):    Full Support         ALEX Rice  05/17/23  11:01 EDT     Electronically signed by ALEX Rice, 05/17/23, 11:22 AM EDT.  Electronically signed by Thania Hernández MD, 05/17/23, 7:01 PM EDT.

## 2023-05-17 NOTE — PROGRESS NOTES
Patient seen and examined.  Agree with assessment plan as outlined in H&P.  Discussed with nephrology who plans to dialyze today and then pull port.  Discussed with infectious disease as well.  Follow blood cultures.  Supportive care.

## 2023-05-17 NOTE — PLAN OF CARE
Goal Outcome Evaluation:   Pt admitted this shift from ER. He arrived via wheelchair on room air. No requests/complaints at this time. VSS, will continue to monitor and follow plan of care.

## 2023-05-17 NOTE — CASE MANAGEMENT/SOCIAL WORK
Discharge Planning Assessment   Marquise     Patient Name: Sudarshan Keene  MRN: [id number]  Today's Date: 5/17/2023    Admit Date: 5/16/2023    Plan: This CM met with pt at bedside to discuss discharge; pt reports from home with s/o Sharri in [facility]; DME as follows, oxygen (using while waiting for C-PAP to be replaced d/t recall), [facility]; denies further DME needs; denies HH or needs; sees ALEX Bain; utilizes family discount drug in [place]; verified payor as [employer] medicare; will attempt to have family or friend to provide transport at discharge however, may need assistance; physician discussed 3-4 days potential discharge.  Pt attends [facility] T/TS with 0600 chair time.   Discharge Needs Assessment     Row Name 05/17/23 1058       Living Environment    People in Home significant other    Name(s) of People in Home Sharri s/o    Current Living Arrangements home    Potentially Unsafe Housing Conditions none    Primary Care Provided by self    Provides Primary Care For no one    Family Caregiver if Needed significant other    Family Caregiver Names Sharri    Quality of Family Relationships unable to assess    Able to Return to Prior Arrangements yes       Resource/Environmental Concerns    Resource/Environmental Concerns none       Transition Planning    Patient/Family Anticipates Transition to home    Patient/Family Anticipated Services at Transition none    Transportation Anticipated family or friend will provide       Discharge Needs Assessment    Readmission Within the Last 30 Days no previous admission in last 30 days    Equipment Currently Used at Home oxygen;cpap;nebulizer    Concerns to be Addressed no discharge needs identified    Anticipated Changes Related to Illness none    Equipment Needed After Discharge none               Discharge Plan     Row Name 05/17/23 1100       Plan    Plan This CM met with pt at bedside to discuss discharge; pt reports from home with s/o  Sharri in [facility]; DME as follows, oxygen (using while waiting for C-PAP to be replaced d/t recall), nebulizer from [facility]; denies further DME needs; denies HH or needs; sees ALEX Bain; utilizes family discount drug in [place]; verified payor as [employer] medicare; will attempt to have family or friend to provide transport at discharge however, may need assistance; physician discussed 3-4 days potential discharge.  Pt attends [facility] T/[facility] with 0600 chair time.    Patient/Family in Agreement with Plan yes    Plan Comments Pt arrived to ED with c/o chills and fatigue; IV Vanco; WBC 12.81; blood cx in process at time of review; nephrology consulted, awaiting note at time of review; afebrile.              Expected Discharge Date and Time     Expected Discharge Date Expected Discharge Time    May 20, 2023         Rebecca Kelley

## 2023-05-18 LAB
ANION GAP SERPL CALCULATED.3IONS-SCNC: 12.9 MMOL/L (ref 5–15)
ANION GAP SERPL CALCULATED.3IONS-SCNC: 13.7 MMOL/L (ref 5–15)
BASOPHILS # BLD AUTO: 0.02 10*3/MM3 (ref 0–0.2)
BASOPHILS NFR BLD AUTO: 0.2 % (ref 0–1.5)
BUN SERPL-MCNC: 32 MG/DL (ref 6–20)
BUN SERPL-MCNC: 46 MG/DL (ref 6–20)
BUN/CREAT SERPL: 4.4 (ref 7–25)
BUN/CREAT SERPL: 5.4 (ref 7–25)
CALCIUM SPEC-SCNC: 9.2 MG/DL (ref 8.6–10.5)
CALCIUM SPEC-SCNC: 9.3 MG/DL (ref 8.6–10.5)
CHLORIDE SERPL-SCNC: 85 MMOL/L (ref 98–107)
CHLORIDE SERPL-SCNC: 91 MMOL/L (ref 98–107)
CO2 SERPL-SCNC: 29.1 MMOL/L (ref 22–29)
CO2 SERPL-SCNC: 29.3 MMOL/L (ref 22–29)
CREAT SERPL-MCNC: 7.3 MG/DL (ref 0.76–1.27)
CREAT SERPL-MCNC: 8.56 MG/DL (ref 0.76–1.27)
DEPRECATED RDW RBC AUTO: 57 FL (ref 37–54)
EGFRCR SERPLBLD CKD-EPI 2021: 7.1 ML/MIN/1.73
EGFRCR SERPLBLD CKD-EPI 2021: 8.5 ML/MIN/1.73
EOSINOPHIL # BLD AUTO: 0.24 10*3/MM3 (ref 0–0.4)
EOSINOPHIL NFR BLD AUTO: 2.4 % (ref 0.3–6.2)
ERYTHROCYTE [DISTWIDTH] IN BLOOD BY AUTOMATED COUNT: 16.1 % (ref 12.3–15.4)
GLUCOSE BLDC GLUCOMTR-MCNC: 116 MG/DL (ref 70–130)
GLUCOSE SERPL-MCNC: 100 MG/DL (ref 65–99)
GLUCOSE SERPL-MCNC: 116 MG/DL (ref 65–99)
HCT VFR BLD AUTO: 35.9 % (ref 37.5–51)
HGB BLD-MCNC: 11.8 G/DL (ref 13–17.7)
IMM GRANULOCYTES # BLD AUTO: 0.07 10*3/MM3 (ref 0–0.05)
IMM GRANULOCYTES NFR BLD AUTO: 0.7 % (ref 0–0.5)
LYMPHOCYTES # BLD AUTO: 0.64 10*3/MM3 (ref 0.7–3.1)
LYMPHOCYTES NFR BLD AUTO: 6.3 % (ref 19.6–45.3)
MAGNESIUM SERPL-MCNC: 2 MG/DL (ref 1.6–2.6)
MCH RBC QN AUTO: 31.6 PG (ref 26.6–33)
MCHC RBC AUTO-ENTMCNC: 32.9 G/DL (ref 31.5–35.7)
MCV RBC AUTO: 96 FL (ref 79–97)
MONOCYTES # BLD AUTO: 1.32 10*3/MM3 (ref 0.1–0.9)
MONOCYTES NFR BLD AUTO: 13 % (ref 5–12)
NEUTROPHILS NFR BLD AUTO: 7.87 10*3/MM3 (ref 1.7–7)
NEUTROPHILS NFR BLD AUTO: 77.4 % (ref 42.7–76)
NRBC BLD AUTO-RTO: 0 /100 WBC (ref 0–0.2)
PLATELET # BLD AUTO: 128 10*3/MM3 (ref 140–450)
PMV BLD AUTO: 10.2 FL (ref 6–12)
POTASSIUM SERPL-SCNC: 4.1 MMOL/L (ref 3.5–5.2)
POTASSIUM SERPL-SCNC: 4.4 MMOL/L (ref 3.5–5.2)
RBC # BLD AUTO: 3.74 10*6/MM3 (ref 4.14–5.8)
SODIUM SERPL-SCNC: 128 MMOL/L (ref 136–145)
SODIUM SERPL-SCNC: 133 MMOL/L (ref 136–145)
VANCOMYCIN SERPL-MCNC: 19.3 MCG/ML (ref 5–40)
WBC NRBC COR # BLD: 10.16 10*3/MM3 (ref 3.4–10.8)

## 2023-05-18 PROCEDURE — 80048 BASIC METABOLIC PNL TOTAL CA: CPT | Performed by: INTERNAL MEDICINE

## 2023-05-18 PROCEDURE — 94761 N-INVAS EAR/PLS OXIMETRY MLT: CPT

## 2023-05-18 PROCEDURE — 99232 SBSQ HOSP IP/OBS MODERATE 35: CPT | Performed by: INTERNAL MEDICINE

## 2023-05-18 PROCEDURE — 80202 ASSAY OF VANCOMYCIN: CPT | Performed by: INTERNAL MEDICINE

## 2023-05-18 PROCEDURE — 25010000002 VANCOMYCIN 1 G RECONSTITUTED SOLUTION 1 EACH VIAL: Performed by: INTERNAL MEDICINE

## 2023-05-18 PROCEDURE — 82948 REAGENT STRIP/BLOOD GLUCOSE: CPT

## 2023-05-18 PROCEDURE — 99233 SBSQ HOSP IP/OBS HIGH 50: CPT | Performed by: INTERNAL MEDICINE

## 2023-05-18 PROCEDURE — 85025 COMPLETE CBC W/AUTO DIFF WBC: CPT | Performed by: INTERNAL MEDICINE

## 2023-05-18 PROCEDURE — 94799 UNLISTED PULMONARY SVC/PX: CPT

## 2023-05-18 PROCEDURE — 83735 ASSAY OF MAGNESIUM: CPT | Performed by: INTERNAL MEDICINE

## 2023-05-18 RX ADMIN — CARVEDILOL 25 MG: 25 TABLET, FILM COATED ORAL at 11:44

## 2023-05-18 RX ADMIN — ROPINIROLE HYDROCHLORIDE 0.25 MG: 0.25 TABLET, FILM COATED ORAL at 11:44

## 2023-05-18 RX ADMIN — BUPROPION HYDROCHLORIDE 300 MG: 150 TABLET, EXTENDED RELEASE ORAL at 06:21

## 2023-05-18 RX ADMIN — ISOSORBIDE MONONITRATE 120 MG: 30 TABLET, EXTENDED RELEASE ORAL at 11:44

## 2023-05-18 RX ADMIN — SODIUM CHLORIDE 1000 MG: 9 INJECTION, SOLUTION INTRAVENOUS at 17:27

## 2023-05-18 RX ADMIN — METOLAZONE 10 MG: 2.5 TABLET ORAL at 11:44

## 2023-05-18 RX ADMIN — BUDESONIDE AND FORMOTEROL FUMARATE DIHYDRATE 2 PUFF: 160; 4.5 AEROSOL RESPIRATORY (INHALATION) at 06:06

## 2023-05-18 RX ADMIN — Medication 10 ML: at 21:07

## 2023-05-18 RX ADMIN — CLONIDINE HYDROCHLORIDE 0.3 MG: 0.3 TABLET ORAL at 16:05

## 2023-05-18 RX ADMIN — PANTOPRAZOLE SODIUM 40 MG: 40 TABLET, DELAYED RELEASE ORAL at 11:44

## 2023-05-18 RX ADMIN — BUMETANIDE 2 MG: 1 TABLET ORAL at 11:43

## 2023-05-18 RX ADMIN — BUDESONIDE AND FORMOTEROL FUMARATE DIHYDRATE 2 PUFF: 160; 4.5 AEROSOL RESPIRATORY (INHALATION) at 18:15

## 2023-05-18 RX ADMIN — ROPINIROLE HYDROCHLORIDE 0.25 MG: 0.25 TABLET, FILM COATED ORAL at 16:05

## 2023-05-18 RX ADMIN — ATORVASTATIN CALCIUM 40 MG: 40 TABLET, FILM COATED ORAL at 21:07

## 2023-05-18 RX ADMIN — MONTELUKAST SODIUM 10 MG: 10 TABLET, COATED ORAL at 21:07

## 2023-05-18 RX ADMIN — TRAMADOL HYDROCHLORIDE 50 MG: 50 TABLET, COATED ORAL at 15:51

## 2023-05-18 RX ADMIN — AMLODIPINE BESYLATE 10 MG: 10 TABLET ORAL at 11:43

## 2023-05-18 RX ADMIN — ROPINIROLE HYDROCHLORIDE 0.25 MG: 0.25 TABLET, FILM COATED ORAL at 21:08

## 2023-05-18 RX ADMIN — Medication 10 ML: at 11:44

## 2023-05-18 RX ADMIN — CLONIDINE HYDROCHLORIDE 0.3 MG: 0.3 TABLET ORAL at 11:44

## 2023-05-18 RX ADMIN — TAMSULOSIN HYDROCHLORIDE 0.4 MG: 0.4 CAPSULE ORAL at 11:44

## 2023-05-18 RX ADMIN — CARVEDILOL 25 MG: 25 TABLET, FILM COATED ORAL at 17:30

## 2023-05-18 NOTE — PROGRESS NOTES
[facility]    S: Patient seen and examined.  No CP or SOB    O: /65 (BP Location: Right arm, Patient Position: Lying)   Pulse 79   Temp 98.1 °F (36.7 °C) (Oral)   Resp 18   Ht 175.3 cm (69\")   Wt 117 kg (257 lb 8 oz) Comment: [facility]z aware of weight loss  SpO2 94%   BMI 38.03 kg/m²     GENERAL:  age appropriate, NAD  EARS,NOSE, MOUTH, THROAT:  MMM, hearing intact  EYES: PERRL, EOMI  CV:  NL S1/S2  RESPIRATORY:  CTAB no w/c/r  GI:  S/NT/ND +BS  SKIN: warm and dry  INT: No edema. No joint deformity.  PSYCH:  Normal affect, A+O x 3  NEURO: Alert and oriented, grossly nonfocal.      Scheduled Meds:ALPRAZolam, 2 mg, Oral, Nightly  amLODIPine, 10 mg, Oral, Daily  atorvastatin, 40 mg, Oral, Nightly  budesonide-formoterol, 2 puff, Inhalation, BID - RT  bumetanide, 2 mg, Oral, Daily  buPROPion XL, 300 mg, Oral, QAM  carvedilol, 25 mg, Oral, BID With Meals  cefTAZidime, 1 g, Intravenous, Q24H  cloNIDine, 0.3 mg, Oral, TID  isosorbide mononitrate, 120 mg, Oral, Daily  linaclotide, 145 mcg, Oral, QAM AC  metOLazone, 10 mg, Oral, Daily  montelukast, 10 mg, Oral, Nightly  pantoprazole, 40 mg, Oral, Daily  rOPINIRole, 0.25 mg, Oral, TID  sodium chloride, 10 mL, Intravenous, Q12H  tamsulosin, 0.4 mg, Oral, Daily  Vancomycin Pharmacy Intermittent/Pulse Dosing, , Does not apply, Daily      Continuous Infusions:   PRN Meds:.•  acetaminophen  •  albuterol  •  nitroglycerin  •  ondansetron  •  sodium chloride  •  sodium chloride  •  traMADol    Labs: Laboratory information reviewed and reconciled.  Results from last 7 days   Lab Units 05/18/23  0112   WBC 10*3/mm3 10.16       Results from last 7 days   Lab Units 05/18/23  0112   CO2 mmol/L 29.1*   BUN mg/dL 46*   CREATININE mg/dL 8.56*   GLUCOSE mg/dL 100*       Results from last 7 days   Lab Units 05/17/23  0053   INR  1.17*         Imaging (last 24 hours):    XR Chest 2 View    Result Date: 5/17/2023   1.  Mildly enlarged heart " size. 2.  No lobar consolidation or edema. 3.  No pleural effusion or pneumothorax. 4.  Left neck central vascular catheter with tip to the SVC/RA junction. 5.  Left anterior chest port with catheter tip into the SVC. 6.  No features to suggest mass. 7.  No air in the soft tissues.  This report was finalized on 5/17/2023 2:48 AM by hSorty Wei MD.       Assessment/Plan:  # Sepsis 2/2 cellulitis/GPC bacteremia  - ID following, appreciate recs  - continue to follow blood cultures  - supportive care  - serial labs  - likely remove dialysis port, discussed with nephro  - continue vanc/ceftazidime for now    # ESRD  - nephro following, appreciate recs    # HTN  - continue home regimen  - monitor and titrate as needed    # COPD without exacerbation  - continue home regimen    # Chronic CHFpEf  - monitor volume status  - continue home regimen    # CAD  - conitnue home regimen    # PAF  - resume eliquis after port pulled        DVT Prophylaxis:eliquis to be restarted after port pulled  Disposition: continue to monitor  Discharge disposition: TBD  Prognosis: guarded    Geovanny Mosquera Jr, MD  [facility] Hospitalist  05/18/23  09:17 EDT

## 2023-05-18 NOTE — CONSULTS
Cardiology  CONSULT NOTE    Consults    Patient Identification:  Name:  Sudarshan Keene  Age:  48 y.o.  Sex:  male  :  1975  MRN:  [id number]  Visit Number:  [id number]  Primary care provider:  Marah Bain APRN    Subjective       Reason for the consult:  To perform NELIDA to rule out infective endocarditis      Chief complaints:  Admitted for problem with HD dialysis catheter infection      History of presenting illness:    48-year-old male has history of ESRD due to hypertensive nephrosclerosis who is on hemodialysis for the last 2 and half years has developed MRSA bacteremia due to hemodialysis catheter related infection.  I have been consulted to perform NELIDA to rule out infective endocarditis.    He denies history of fever at present.  WBC count is normal.  No history of valvular heart disease.    He has history of heart failure with preserved EF, recurrent chest pain and paroxysmal A-fib.  Anticoagulated on Eliquis.  Intermittent chest pain.    He has history of hypertension and hyperlipidemia.  No history of DM.  Former smoker.      --------------------------------------------------------------------------------------------------------------------  Review of Systems:  Constitutional-tiredness, ENT-none, cardiovascular- intermittent chest pain, respiratory-dyspnea/COPD, sleep apnea, genitourinary- kidney problems.  Complete review done.    ---------------------------------------------------------------------------------------------------------------------   Past History:  Family History   Problem Relation Age of Onset   • Hypertension Mother    • Hypertension Father    • Hypertension Sister    • Heart disease Sister      Past Medical History:   Diagnosis Date   • Arthritis    • Asthma    • CHF (congestive heart failure)    • COPD (chronic obstructive pulmonary disease)    • Coronary artery disease    • Hypertension    • Sleep apnea      Past Surgical History:   Procedure Laterality Date    • CARDIAC CATHETERIZATION  2008   • HERNIA REPAIR       Social History     Socioeconomic History   • Marital status: Single   Tobacco Use   • Smoking status: Former     Packs/day: 0.25     Types: Cigarettes     Quit date: 2014     Years since quittin.8     Passive exposure: Never   • Smokeless tobacco: Never   Vaping Use   • Vaping Use: Never used   Substance and Sexual Activity   • Alcohol use: Never   • Drug use: Yes     Frequency: 7.0 times per week     Types: Marijuana     Comment: tobacco free   • Sexual activity: Yes     Partners: Female     ---------------------------------------------------------------------------------------------------------------------   Allergies:  Patient has no known allergies.  ---------------------------------------------------------------------------------------------------------------------     Hospital Meds:  ALPRAZolam, 2 mg, Oral, Nightly  amLODIPine, 10 mg, Oral, Daily  atorvastatin, 40 mg, Oral, Nightly  budesonide-formoterol, 2 puff, Inhalation, BID - RT  bumetanide, 2 mg, Oral, Daily  buPROPion XL, 300 mg, Oral, QAM  carvedilol, 25 mg, Oral, BID With Meals  cloNIDine, 0.3 mg, Oral, TID  isosorbide mononitrate, 120 mg, Oral, Daily  linaclotide, 145 mcg, Oral, QAM AC  metOLazone, 10 mg, Oral, Daily  montelukast, 10 mg, Oral, Nightly  pantoprazole, 40 mg, Oral, Daily  rOPINIRole, 0.25 mg, Oral, TID  sodium chloride, 10 mL, Intravenous, Q12H  tamsulosin, 0.4 mg, Oral, Daily  vancomycin, 1,000 mg, Intravenous, Once  Vancomycin Pharmacy Intermittent/Pulse Dosing, , Does not apply, Daily         ---------------------------------------------------------------------------------------------------------------------     Objective     Vital Signs:  Temp:  [97.8 °F (36.6 °C)-98.6 °F (37 °C)] 97.8 °F (36.6 °C)  Heart Rate:  [] 75  Resp:  [16-20] 20  BP: (102-169)/(65-85) 110/70      23  0318 23  0500 23  0545   Weight: 120 kg (263 lb 10.7 oz) 120  kg (263 lb 10.7 oz) (new admit) 117 kg (257 lb 8 oz) ([facility] aware of weight loss)     Body mass index is 38.03 kg/m².  ---------------------------------------------------------------------------------------------------------------------   Physical exam:       General Appearance:  HEENT:   Neck:     Alert, cooperative, in no acute distress  Grossly normal   No JVD    Lungs:   Clear to auscultation,respirations regular, No added sounds     Heart:  Regular rhythm and normal rate, normal S1 and S2, no        murmur, no gallop, no rub, no click    Chest Wall:  No abnormalities observed    Abdomen   Soft, nontender, no masses, no organomegaly and bowel sounds are heard.     Extremities: No pedal edema    Pulses: Pulses palpable and equal bilaterally    Neurologic: No focal deficits        Telemetry:  Sinus rhythm    ---------------------------------------------------------------------------------------------------------------------   ECG: Sinus tachycardia and nonspecific IVCD    --------------------------------------------------------------------------------------------------------------------   Results from last 7 days   Lab Units 05/18/23 0112 05/17/23 0316 05/17/23 0053   CRP mg/dL  --   --  11.81*   LACTATE mmol/L  --   --  0.7   WBC 10*3/mm3 10.16 12.81* 14.09*   HEMOGLOBIN g/dL 11.8* 12.8* 12.2*   HEMATOCRIT % 35.9* 40.4 38.6   MCV fL 96.0 99.3* 99.7*   MCHC g/dL 32.9 31.7 31.6   PLATELETS 10*3/mm3 128* 155 150   INR   --   --  1.17*         Results from last 7 days   Lab Units 05/18/23 0112 05/17/23 0316 05/17/23 0053   SODIUM mmol/L 133* 133* 135*   POTASSIUM mmol/L 4.4 5.2 5.1   CHLORIDE mmol/L 91* 98 99   CO2 mmol/L 29.1* 19.5* 21.5*   BUN mg/dL 46* 59* 58*   CREATININE mg/dL 8.56* 10.19* 10.65*   CALCIUM mg/dL 9.3 9.2 9.1   GLUCOSE mg/dL 100* 70 83   ALBUMIN g/dL  --  3.7 3.6   BILIRUBIN mg/dL  --  0.4 0.4   ALK PHOS U/L  --  55 52   AST (SGOT) U/L  --  13 14   ALT (SGPT) U/L  --  7 6   Estimated  Creatinine Clearance: 13.3 mL/min (A) (by C-G formula based on SCr of 8.56 mg/dL (H)).  No results found for: AMMONIA          Lab Results   Component Value Date    HGBA1C 5.60 08/01/2020     Lab Results   Component Value Date    TSH 1.670 08/01/2020    FREET4 1.05 12/21/2015     No results found for: PREGTESTUR, PREGSERUM, HCG, HCGQUANT  Pain Management Panel         Latest Ref Rng & Units 12/21/2021 12/19/2021   Pain Management Panel   Creatinine, Urine mg/dL 48.3   64.1                Blood Culture   Date Value Ref Range Status   05/17/2023 Abnormal Stain (C)  Preliminary   05/17/2023 Abnormal Stain (C)  Preliminary   05/17/2023 Abnormal Stain (C)  Preliminary     No results found for: URINECX  No results found for: WOUNDCX  No results found for: STOOLCX      ---------------------------------------------------------------------------------------------------------------------   Radiology:    No results found for this or any previous visit.      No results found for this or any previous visit.      Results for orders placed during the hospital encounter of 12/17/21    XR Chest 1 View    Narrative  EXAM:  XR Chest, 1 View    EXAM DATE:  12/17/2021 1:52 PM    CLINICAL HISTORY:  Chest pain protocol    TECHNIQUE:  Frontal view of the chest.    COMPARISON:  08/02/2020    FINDINGS:  LUNGS:  Unremarkable.  No consolidation.  PLEURAL SPACE:  Minimal blunting of bilateral costophrenic angle  suggestive of tiny pleural effusion.  No pneumothorax.  HEART:  Cardiomegaly.  MEDIASTINUM:  Unremarkable.  BONES/JOINTS:  Unremarkable.  TUBES, LINES AND DEVICES:  Left Port-A-Cath with tip in SVC.    Impression  1.  Cardiomegaly.  2.  Minimal blunting of bilateral costophrenic angle suggestive of tiny  pleural effusion.    This report was finalized on 12/17/2021 2:38 PM by Dr. Stu Simpson MD.      No results found for this or any previous visit.      I have personally reviewed the radiology images and read the final radiology  report.        Assessment      1.  MRSA bacteremia due to HD catheter line infection.  Rule out IE.  2.  Chronic heart failure with preserved EF.  Clinically compensated.  3.  Paroxysmal A-fib.  In sinus rhythm.  On Eliquis.  4.  ESRD on HD    Recommendations     Scheduled NELIDA in a.m.  I explained the procedure, its purpose, risk and benefits.  Agreeable to proceed.    Thank you Dr. Mosquera for the opportunity to participate in the care of your patient. Please do not hesitate to call with any questions or concerns.     Andrews Sutton MD, [facility],  05/18/23  17:05 EDT

## 2023-05-18 NOTE — PROCEDURES
Procedure Note    Procedure: Removal of tunneled hemodialysis catheter    Pre-op: Infected tunneled hemodialysis catheter    Postop: Same    Location: Left subclavian    Anesthesia: 1% lidocaine with epinephrine    Description:  The risks and benefits of the procedure were discussed.  After prep with Betadine and infiltration with lidocaine around the catheter exit site the catheter was freed from the surrounding tissue with blunt manipulation.  The cuff of the catheter was not incorporated into the soft tissue.  Pressure was held and hemostasis was obtained.  Dressing was placed    Complications:  none

## 2023-05-18 NOTE — PROGRESS NOTES
PROGRESS NOTE         Patient Identification:  Name:  Sudarshan Keene  Age:  48 y.o.  Sex:  male  :  1975  MRN:  [id number]  Visit Number:  [id number]  Primary Care Provider:  Marah Bain APRN         LOS: 1 day       ----------------------------------------------------------------------------------------------------------------------  Subjective       Chief Complaints:    Vascular Access Problem        Interval History:      The patient is receiving dialysis this morning.  Remains on room air in no apparent distress.  Complains of some nausea and one episode of vomiting yesterday afternoon, but otherwise has no particular complaints at this time.  Denies shortness of breath or cough.  Denies fever or chills.  Denies abdominal pain, nausea, vomiting, or diarrhea.  Scheduled to have dialysis catheter removed today.  Lungs are clear to auscultation bilaterally.  Abdomen is soft, nontender, with normal bowel sounds.  No edema.  Afebrile, no diarrhea.  Leukocytosis is resolved with a WBC of 10.16.  3 out of 3 blood cultures on 2023 are so far showing gram-positive cocci in clusters with MRSA on BC ID.  Repeat blood cultures have yet to be collected.    Review of Systems:    Constitutional: no fever, chills and night sweats.  Generalized fatigue.  Eyes: no eye drainage, itching or redness.  HEENT: no mouth sores, dysphagia or nose bleed.  Respiratory: no for shortness of breath, cough or production of sputum.  Cardiovascular: no chest pain, no palpitations, no orthopnea.  Gastrointestinal: no diarrhea. No abdominal pain, hematemesis or rectal bleeding.  Nausea and vomiting.  Genitourinary: no dysuria or polyuria.  Hematologic/lymphatic: no lymph node abnormalities, no easy bruising or easy bleeding.  Musculoskeletal: no muscle or joint pain.  Skin: No rash and no itching.  Neurological: no loss of consciousness, no seizure, no headache.  Behavioral/Psych: no depression or suicidal  ideation.  Endocrine: no hot flashes.  Immunologic: negative.    ----------------------------------------------------------------------------------------------------------------------      Objective       [facility] Meds:  ALPRAZolam, 2 mg, Oral, Nightly  amLODIPine, 10 mg, Oral, Daily  atorvastatin, 40 mg, Oral, Nightly  budesonide-formoterol, 2 puff, Inhalation, BID - RT  bumetanide, 2 mg, Oral, Daily  buPROPion XL, 300 mg, Oral, QAM  carvedilol, 25 mg, Oral, BID With Meals  cefTAZidime, 1 g, Intravenous, Q24H  cloNIDine, 0.3 mg, Oral, TID  isosorbide mononitrate, 120 mg, Oral, Daily  linaclotide, 145 mcg, Oral, QAM AC  metOLazone, 10 mg, Oral, Daily  montelukast, 10 mg, Oral, Nightly  pantoprazole, 40 mg, Oral, Daily  rOPINIRole, 0.25 mg, Oral, TID  sodium chloride, 10 mL, Intravenous, Q12H  tamsulosin, 0.4 mg, Oral, Daily  Vancomycin Pharmacy Intermittent/Pulse Dosing, , Does not apply, Daily         ----------------------------------------------------------------------------------------------------------------------    Vital Signs:  Temp:  [97.6 °F (36.4 °C)-98.4 °F (36.9 °C)] 98.1 °F (36.7 °C)  Heart Rate:  [] 79  Resp:  [18] 18  BP: (102-169)/(65-94) 113/65  Mean Arterial Pressure (Non-Invasive) for the past 24 hrs (Last 3 readings):   Noninvasive MAP (mmHg)   05/18/23 0630 80   05/18/23 0257 96   05/17/23 2305 81     SpO2 Percentage    05/17/23 2305 05/18/23 0257 05/18/23 0630   SpO2: 98% 92% 94%     SpO2:  [92 %-98 %] 94 %  on   ;   Device (Oxygen Therapy): room air    Body mass index is 38.03 kg/m².  Wt Readings from Last 3 Encounters:   05/18/23 117 kg (257 lb 8 oz)   12/21/21 (!) 148 kg (326 lb)   08/04/20 (!) 166 kg (366 lb 11.2 oz)        Intake/Output Summary (Last 24 hours) at 5/18/2023 0955  Last data filed at 5/17/2023 2200  Gross per 24 hour   Intake 1080 ml   Output 3000 ml   Net -1920 ml     Diet: Renal Diets; Low Sodium (2-3g), Low Potassium, Low Phosphorus; Texture: Regular Texture  (IDDSI 7); Fluid Consistency: Thin (IDDSI 0)  ----------------------------------------------------------------------------------------------------------------------      Physical Exam:    Constitutional: Well-developed and well-nourished black male is sitting up in a chair receiving dialysis this morning.  HENT:  Head: Normocephalic and atraumatic.  Mouth:  Moist mucous membranes.    Eyes:  Conjunctivae and EOM are normal.  No scleral icterus.  Neck:  Neck supple.  No JVD present.    Cardiovascular:  Normal rate, regular rhythm and normal heart sounds with no murmur. No edema.  Pulmonary/Chest:  No respiratory distress, no wheezes, no crackles, with normal breath sounds and good air movement.  Left IJ tunneled dialysis catheter in place.  Receiving dialysis this morning.  Abdominal:  Soft.  Bowel sounds are normal.  No distension and no tenderness.   Musculoskeletal:  No edema, no tenderness, and no deformity.  No swelling or redness of joints.  Neurological:  Alert and oriented to person, place, and time.  No facial droop.  No slurred speech.   Skin:  Skin is warm and dry.  No rash noted.  No pallor.  Left AV fistula, not mature at this time.  Psychiatric:  Normal mood and affect.  Behavior is normal.        ----------------------------------------------------------------------------------------------------------------------            Results from last 7 days   Lab Units 05/18/23  0112 05/17/23 0316 05/17/23 0053   CRP mg/dL  --   --  11.81*   LACTATE mmol/L  --   --  0.7   WBC 10*3/mm3 10.16 12.81* 14.09*   HEMOGLOBIN g/dL 11.8* 12.8* 12.2*   HEMATOCRIT % 35.9* 40.4 38.6   MCV fL 96.0 99.3* 99.7*   MCHC g/dL 32.9 31.7 31.6   PLATELETS 10*3/mm3 128* 155 150   INR   --   --  1.17*     Results from last 7 days   Lab Units 05/18/23  0112 05/17/23 0316 05/17/23 0053   SODIUM mmol/L 133* 133* 135*   POTASSIUM mmol/L 4.4 5.2 5.1   CHLORIDE mmol/L 91* 98 99   CO2 mmol/L 29.1* 19.5* 21.5*   BUN mg/dL 46* 59* 58*    CREATININE mg/dL 8.56* 10.19* 10.65*   CALCIUM mg/dL 9.3 9.2 9.1   GLUCOSE mg/dL 100* 70 83   ALBUMIN g/dL  --  3.7 3.6   BILIRUBIN mg/dL  --  0.4 0.4   ALK PHOS U/L  --  55 52   AST (SGOT) U/L  --  13 14   ALT (SGPT) U/L  --  7 6   Estimated Creatinine Clearance: 13.3 mL/min (A) (by C-G formula based on SCr of 8.56 mg/dL (H)).  No results found for: AMMONIA    No results found for: HGBA1C, POCGLU  Lab Results   Component Value Date    HGBA1C 5.60 08/01/2020     Lab Results   Component Value Date    TSH 1.670 08/01/2020    FREET4 1.05 12/21/2015       Blood Culture   Date Value Ref Range Status   05/17/2023 Abnormal Stain (C)  Preliminary   05/17/2023 Abnormal Stain (C)  Preliminary   05/17/2023 Abnormal Stain (C)  Preliminary     No results found for: URINECX  No results found for: WOUNDCX  No results found for: STOOLCX  No results found for: RESPCX  Pain Management Panel           Latest Ref Rng & Units 12/21/2021 12/19/2021   Pain Management Panel   Creatinine, Urine mg/dL 48.3   64.1                    ----------------------------------------------------------------------------------------------------------------------  Imaging Results (Last 24 Hours)       ** No results found for the last 24 hours. **            ----------------------------------------------------------------------------------------------------------------------    Pertinent Infectious Disease Results    COVID-19 and influenza PCR negative.  Chest x-ray from 5/17/2023 reports mildly enlarged heart size, no lobar consolidation or edema, no pleural effusion or pneumothorax, left neck central vascular catheter with tip in the SVC/RA junction, left anterior chest port with catheter tip into the SVC.        Assessment/Plan       Assessment     MRSA bacteremia  Tunneled dialysis catheter infection    Plan      I have seen and examined the patient myself this morning and discussed the plan of care with Shayna Brand PA-C and discussed overnight  changes with primary RN here are my findings:    The patient is currently undergoing dialysis this morning. They are on room air and show no apparent distress. The patient reports experiencing some nausea and a single episode of vomiting yesterday afternoon but otherwise has no specific complaints at the moment. They deny experiencing shortness of breath, cough, fever, chills, abdominal pain, or diarrhea. The scheduled plan is to have the dialysis catheter removed today.    Upon examination, the patient's lungs are clear to auscultation bilaterally, and their abdomen is soft, nontender, with normal bowel sounds. There is no edema present. The patient is afebrile, and there is no evidence of diarrhea. The previously observed leukocytosis has resolved, with a white blood cell count (WBC) of 10.16. Out of the three blood cultures conducted on 5/17/2023, so far, gram-positive cocci in clusters with methicillin-resistant Staphylococcus aureus (MRSA) have been identified on blood culture identification (BC ID). Repeat blood cultures are pending collection.    The current plan is to remove the dialysis catheter and provide the patient with a 72-hour period free from dialysis before inserting another catheter. This break will allow for additional maturation of the arteriovenous (AV) fistula. It is recommended to continue vancomycin treatment as prescribed by the pharmacy, while ceftazidime has been discontinued due to the blood cultures only showing growth of MRSA. Additionally, a transesophageal echocardiogram is recommended. Blood cultures will be repeated after the removal of the dialysis catheter.    Overall, the plan includes removal of the dialysis catheter, providing a dialysis-free period, continuation of vancomycin treatment, NELIDA, and repeating blood cultures once the catheter has been removed.      ANTIMICROBIAL THERAPY    cefTAZidime (FORTAZ) IVPB in 100 mL  Vancomycin Pharmacy Intermittent/Pulse Dosing     Code  Status:   Code Status and Medical Interventions:   Ordered at: 05/17/23 0152     Code Status (Patient has no pulse and is not breathing):    CPR (Attempt to Resuscitate)     Medical Interventions (Patient has pulse or is breathing):    Full Support       Shayna Brand PA-C  05/18/23  09:55 EDT     Electronically signed by Shayna Brand PA-C, 05/18/23, 12:52 PM EDT.    Electronically signed by Thania Hernández MD, 05/18/23, 2:59 PM EDT.

## 2023-05-18 NOTE — PLAN OF CARE
Goal Outcome Evaluation:  Plan of Care Reviewed With: patient           Outcome Evaluation: Pt resting in bed or up ad micky for ADLs. AOx4, VSS, no complaints or s/s of acute distress noted. Pt is calm and cooperative w/ care and treatment. Pt had dialysis today prior to removal of tunneled hemodialysis catheter by Dr. Luz. Pt tolerated treatment well, minimal bleeding to site w/ no c/o of tenderness of pain to palpation or at rest. IV access and telemetry monitoring patent and maintained, will continue to follow plan of care.

## 2023-05-18 NOTE — PROGRESS NOTES
Nephrology Progress Note      Subjective     Seen on dialysis, tolerating it very well.  No chest pain or shortness of breath  Objective       Vital signs :     Temp:  [98.1 °F (36.7 °C)-98.4 °F (36.9 °C)] 98.1 °F (36.7 °C)  Heart Rate:  [] 82  Resp:  [16-18] 18  BP: (102-169)/(65-85) 130/78    Intake/Output                       05/17/23 0701 - 05/18/23 0700 05/18/23 0701 - 05/19/23 0700     [id number] Total [id number] 9444-2849 Total                 Intake    P.O.  1200  240 1440  24  -- 24    Total Intake 8510 833 9543 24 -- 24       Output    Other  3000  -- 3000  1500  -- 1500    Ultrafiltration (mL) 3000 -- 3000 1500 -- 1500    Total Output 3000 -- 3000 1500 -- 1500           Physical Exam:    General Appearance : not in acute distress  Lungs : clear to auscultation, respirations regular  Heart :  regular rhythm & normal rate, normal S1, S2 and no murmur, no rub  Abdomen : soft, non distended  Extremities : no edema  Neurologic :   orientated to person, place, time and situation, Grossly no focal deficits  IJ TDC with exit site infection and tunnel infection    Laboratory Data :     Albumin Albumin   Date Value Ref Range Status   05/17/2023 3.7 3.5 - 5.2 g/dL Final   05/17/2023 3.6 3.5 - 5.2 g/dL Final      Magnesium No results found for: MG       PTH               No results found for: PTH    CBC and coagulation:  Results from last 7 days   Lab Units 05/18/23  0112 05/17/23  0316 05/17/23  0053   LACTATE mmol/L  --   --  0.7   SED RATE mm/hr  --   --  26*   CRP mg/dL  --   --  11.81*   WBC 10*3/mm3 10.16 12.81* 14.09*   HEMOGLOBIN g/dL 11.8* 12.8* 12.2*   HEMATOCRIT % 35.9* 40.4 38.6   MCV fL 96.0 99.3* 99.7*   MCHC g/dL 32.9 31.7 31.6   PLATELETS 10*3/mm3 128* 155 150   INR   --   --  1.17*     Acid/base balance:      Renal and electrolytes:    Results from last 7 days   Lab Units 05/18/23  0112 05/17/23  0316 05/17/23  0053   SODIUM mmol/L 133* 133* 135*   POTASSIUM mmol/L 4.4 5.2 5.1    CHLORIDE mmol/L 91* 98 99   CO2 mmol/L 29.1* 19.5* 21.5*   BUN mg/dL 46* 59* 58*   CREATININE mg/dL 8.56* 10.19* 10.65*   CALCIUM mg/dL 9.3 9.2 9.1     Estimated Creatinine Clearance: 13.3 mL/min (A) (by C-G formula based on SCr of 8.56 mg/dL (H)).  @GFRCG:3@   Liver and pancreatic function:  Results from last 7 days   Lab Units 05/17/23  0316 05/17/23  0053   ALBUMIN g/dL 3.7 3.6   BILIRUBIN mg/dL 0.4 0.4   ALK PHOS U/L 55 52   AST (SGOT) U/L 13 14   ALT (SGPT) U/L 7 6         Cardiac:      Liver and pancreatic function:  Results from last 7 days   Lab Units 05/17/23  0316 05/17/23 0053   ALBUMIN g/dL 3.7 3.6   BILIRUBIN mg/dL 0.4 0.4   ALK PHOS U/L 55 52   AST (SGOT) U/L 13 14   ALT (SGPT) U/L 7 6       Medications :     ALPRAZolam, 2 mg, Oral, Nightly  amLODIPine, 10 mg, Oral, Daily  atorvastatin, 40 mg, Oral, Nightly  budesonide-formoterol, 2 puff, Inhalation, BID - RT  bumetanide, 2 mg, Oral, Daily  buPROPion XL, 300 mg, Oral, QAM  carvedilol, 25 mg, Oral, BID With Meals  cloNIDine, 0.3 mg, Oral, TID  isosorbide mononitrate, 120 mg, Oral, Daily  linaclotide, 145 mcg, Oral, QAM AC  metOLazone, 10 mg, Oral, Daily  montelukast, 10 mg, Oral, Nightly  pantoprazole, 40 mg, Oral, Daily  rOPINIRole, 0.25 mg, Oral, TID  sodium chloride, 10 mL, Intravenous, Q12H  tamsulosin, 0.4 mg, Oral, Daily  Vancomycin Pharmacy Intermittent/Pulse Dosing, , Does not apply, Daily             Assessment & Plan     1. ESKD on IHDx  2. Sever sepsis likely due to tunneled dialysis cath infection  3. Anemia  4. SHPT    Evaluated on dialysis no acute issues.  Planned ultrafiltration approximately 2 L.  Have requested general surgery to remove tunneled dialysis catheter after today's dialysis.  We will send the blood cultures tomorrow and once the cultures are negative we will plan for reinsertion of tunneled dialysis catheter.  If patient do require dialysis in between then we have to put a temporary dialysis catheter.  Educated and  counseled the patient      Jake Lala MD  05/18/23  13:30 EDT

## 2023-05-18 NOTE — PLAN OF CARE
Goal Outcome Evaluation:         Pt resting well with no sign of discomfort or distress, call bell in reach

## 2023-05-19 ENCOUNTER — ANESTHESIA EVENT (OUTPATIENT)
Dept: CARDIOLOGY | Facility: HOSPITAL | Age: 48
End: 2023-05-19
Payer: MEDICARE

## 2023-05-19 ENCOUNTER — ANESTHESIA (OUTPATIENT)
Dept: CARDIOLOGY | Facility: HOSPITAL | Age: 48
End: 2023-05-19
Payer: MEDICARE

## 2023-05-19 ENCOUNTER — APPOINTMENT (OUTPATIENT)
Dept: CARDIOLOGY | Facility: HOSPITAL | Age: 48
End: 2023-05-19
Payer: MEDICARE

## 2023-05-19 LAB
ANION GAP SERPL CALCULATED.3IONS-SCNC: 12.9 MMOL/L (ref 5–15)
ANISOCYTOSIS BLD QL: ABNORMAL
BASOPHILS # BLD MANUAL: 0.09 10*3/MM3 (ref 0–0.2)
BASOPHILS NFR BLD MANUAL: 1 % (ref 0–1.5)
BUN SERPL-MCNC: 43 MG/DL (ref 6–20)
BUN/CREAT SERPL: 5.3 (ref 7–25)
CALCIUM SPEC-SCNC: 9.2 MG/DL (ref 8.6–10.5)
CHLORIDE SERPL-SCNC: 91 MMOL/L (ref 98–107)
CO2 SERPL-SCNC: 29.1 MMOL/L (ref 22–29)
CREAT SERPL-MCNC: 8.19 MG/DL (ref 0.76–1.27)
DEPRECATED RDW RBC AUTO: 57.1 FL (ref 37–54)
EGFRCR SERPLBLD CKD-EPI 2021: 7.4 ML/MIN/1.73
EOSINOPHIL # BLD MANUAL: 0.27 10*3/MM3 (ref 0–0.4)
EOSINOPHIL NFR BLD MANUAL: 3 % (ref 0.3–6.2)
ERYTHROCYTE [DISTWIDTH] IN BLOOD BY AUTOMATED COUNT: 15.9 % (ref 12.3–15.4)
GLUCOSE SERPL-MCNC: 101 MG/DL (ref 65–99)
HCT VFR BLD AUTO: 36.3 % (ref 37.5–51)
HGB BLD-MCNC: 11.9 G/DL (ref 13–17.7)
LV EF 2D ECHO EST: 65 %
LYMPHOCYTES # BLD MANUAL: 1.15 10*3/MM3 (ref 0.7–3.1)
LYMPHOCYTES NFR BLD MANUAL: 10 % (ref 5–12)
MACROCYTES BLD QL SMEAR: ABNORMAL
MAXIMAL PREDICTED HEART RATE: 172 BPM
MCH RBC QN AUTO: 32.2 PG (ref 26.6–33)
MCHC RBC AUTO-ENTMCNC: 32.8 G/DL (ref 31.5–35.7)
MCV RBC AUTO: 98.4 FL (ref 79–97)
MONOCYTES # BLD: 0.89 10*3/MM3 (ref 0.1–0.9)
NEUTROPHILS # BLD AUTO: 6.47 10*3/MM3 (ref 1.7–7)
NEUTROPHILS NFR BLD MANUAL: 68 % (ref 42.7–76)
NEUTS BAND NFR BLD MANUAL: 5 % (ref 0–5)
PLATELET # BLD AUTO: 105 10*3/MM3 (ref 140–450)
PMV BLD AUTO: 10.9 FL (ref 6–12)
POTASSIUM SERPL-SCNC: 4.7 MMOL/L (ref 3.5–5.2)
RBC # BLD AUTO: 3.69 10*6/MM3 (ref 4.14–5.8)
SCAN SLIDE: NORMAL
SMALL PLATELETS BLD QL SMEAR: ABNORMAL
SODIUM SERPL-SCNC: 133 MMOL/L (ref 136–145)
STRESS TARGET HR: 146 BPM
VARIANT LYMPHS NFR BLD MANUAL: 13 % (ref 19.6–45.3)
WBC NRBC COR # BLD: 8.86 10*3/MM3 (ref 3.4–10.8)

## 2023-05-19 PROCEDURE — 93321 DOPPLER ECHO F-UP/LMTD STD: CPT

## 2023-05-19 PROCEDURE — 85025 COMPLETE CBC W/AUTO DIFF WBC: CPT | Performed by: INTERNAL MEDICINE

## 2023-05-19 PROCEDURE — 99232 SBSQ HOSP IP/OBS MODERATE 35: CPT | Performed by: PHYSICIAN ASSISTANT

## 2023-05-19 PROCEDURE — 99233 SBSQ HOSP IP/OBS HIGH 50: CPT | Performed by: INTERNAL MEDICINE

## 2023-05-19 PROCEDURE — 94799 UNLISTED PULMONARY SVC/PX: CPT

## 2023-05-19 PROCEDURE — 85007 BL SMEAR W/DIFF WBC COUNT: CPT | Performed by: INTERNAL MEDICINE

## 2023-05-19 PROCEDURE — 87040 BLOOD CULTURE FOR BACTERIA: CPT | Performed by: PHYSICIAN ASSISTANT

## 2023-05-19 PROCEDURE — 94761 N-INVAS EAR/PLS OXIMETRY MLT: CPT

## 2023-05-19 PROCEDURE — 25010000002 PROPOFOL 10 MG/ML EMULSION: Performed by: NURSE ANESTHETIST, CERTIFIED REGISTERED

## 2023-05-19 PROCEDURE — 80048 BASIC METABOLIC PNL TOTAL CA: CPT | Performed by: INTERNAL MEDICINE

## 2023-05-19 PROCEDURE — 93312 ECHO TRANSESOPHAGEAL: CPT

## 2023-05-19 PROCEDURE — 93325 DOPPLER ECHO COLOR FLOW MAPG: CPT

## 2023-05-19 RX ORDER — PROPOFOL 10 MG/ML
VIAL (ML) INTRAVENOUS AS NEEDED
Status: DISCONTINUED | OUTPATIENT
Start: 2023-05-19 | End: 2023-05-19 | Stop reason: SURG

## 2023-05-19 RX ADMIN — PROPOFOL 30 MG: 10 INJECTION, EMULSION INTRAVENOUS at 09:35

## 2023-05-19 RX ADMIN — ROPINIROLE HYDROCHLORIDE 0.25 MG: 0.25 TABLET, FILM COATED ORAL at 15:50

## 2023-05-19 RX ADMIN — ROPINIROLE HYDROCHLORIDE 0.25 MG: 0.25 TABLET, FILM COATED ORAL at 10:54

## 2023-05-19 RX ADMIN — Medication 10 ML: at 20:29

## 2023-05-19 RX ADMIN — Medication 10 ML: at 10:56

## 2023-05-19 RX ADMIN — ALPRAZOLAM 2 MG: 1 TABLET ORAL at 20:29

## 2023-05-19 RX ADMIN — BUPROPION HYDROCHLORIDE 300 MG: 150 TABLET, EXTENDED RELEASE ORAL at 10:55

## 2023-05-19 RX ADMIN — PROPOFOL 30 MG: 10 INJECTION, EMULSION INTRAVENOUS at 09:38

## 2023-05-19 RX ADMIN — PANTOPRAZOLE SODIUM 40 MG: 40 TABLET, DELAYED RELEASE ORAL at 10:54

## 2023-05-19 RX ADMIN — CLONIDINE HYDROCHLORIDE 0.3 MG: 0.3 TABLET ORAL at 10:55

## 2023-05-19 RX ADMIN — PROPOFOL 30 MG: 10 INJECTION, EMULSION INTRAVENOUS at 09:37

## 2023-05-19 RX ADMIN — PROPOFOL 30 MG: 10 INJECTION, EMULSION INTRAVENOUS at 09:43

## 2023-05-19 RX ADMIN — ROPINIROLE HYDROCHLORIDE 0.25 MG: 0.25 TABLET, FILM COATED ORAL at 20:28

## 2023-05-19 RX ADMIN — TAMSULOSIN HYDROCHLORIDE 0.4 MG: 0.4 CAPSULE ORAL at 10:54

## 2023-05-19 RX ADMIN — APIXABAN 5 MG: 5 TABLET, FILM COATED ORAL at 20:29

## 2023-05-19 RX ADMIN — CARVEDILOL 25 MG: 25 TABLET, FILM COATED ORAL at 10:55

## 2023-05-19 RX ADMIN — APIXABAN 5 MG: 5 TABLET, FILM COATED ORAL at 12:17

## 2023-05-19 RX ADMIN — ATORVASTATIN CALCIUM 40 MG: 40 TABLET, FILM COATED ORAL at 20:29

## 2023-05-19 RX ADMIN — METOLAZONE 10 MG: 2.5 TABLET ORAL at 10:54

## 2023-05-19 RX ADMIN — BUDESONIDE AND FORMOTEROL FUMARATE DIHYDRATE 2 PUFF: 160; 4.5 AEROSOL RESPIRATORY (INHALATION) at 06:38

## 2023-05-19 RX ADMIN — BUDESONIDE AND FORMOTEROL FUMARATE DIHYDRATE 2 PUFF: 160; 4.5 AEROSOL RESPIRATORY (INHALATION) at 18:17

## 2023-05-19 RX ADMIN — AMLODIPINE BESYLATE 10 MG: 10 TABLET ORAL at 10:54

## 2023-05-19 RX ADMIN — PROPOFOL 50 MG: 10 INJECTION, EMULSION INTRAVENOUS at 09:34

## 2023-05-19 RX ADMIN — PROPOFOL 30 MG: 10 INJECTION, EMULSION INTRAVENOUS at 09:41

## 2023-05-19 RX ADMIN — BUMETANIDE 2 MG: 1 TABLET ORAL at 10:54

## 2023-05-19 RX ADMIN — PROPOFOL 30 MG: 10 INJECTION, EMULSION INTRAVENOUS at 09:39

## 2023-05-19 RX ADMIN — MONTELUKAST SODIUM 10 MG: 10 TABLET, COATED ORAL at 20:29

## 2023-05-19 RX ADMIN — ISOSORBIDE MONONITRATE 120 MG: 30 TABLET, EXTENDED RELEASE ORAL at 10:54

## 2023-05-19 NOTE — PLAN OF CARE
Goal Outcome Evaluation:  Plan of Care Reviewed With: patient           Outcome Evaluation: Pt resting in bed this shift or up ad micky for ADLs. AOx4, VSS, no complaints or s/s of acute distress noted. Pt is calm and cooperative w/ care and treatment. Pt had NELIDA performed, tolerated well per pt. IV access and telemetry monitoring patent and maintained, will continue to follow plan of care.

## 2023-05-19 NOTE — PROGRESS NOTES
Nephrology Progress Note      Subjective     Pt had dialysis done yesterday, tolerated well.   Objective       Vital signs :     Temp:  [97.8 °F (36.6 °C)-98.7 °F (37.1 °C)] 98.7 °F (37.1 °C)  Heart Rate:  [74-93] 77  Resp:  [16-20] 16  BP: ()/(60-78) 122/78    Intake/Output                       05/17/23 0701 - 05/18/23 0700 05/18/23 0701 - 05/19/23 0700     4726-0064 9009-9537 Total 5294-5305 6669-9690 Total                 Intake    P.O.  1200  240 1440  24  -- 24    Total Intake 2863 965 0272 24 -- 24       Output    Other  3000  -- 3000  1500  -- 1500    Ultrafiltration (mL) 3000 -- 3000 1500 -- 1500    Total Output 3000 -- 3000 1500 -- 1500           Physical Exam:    General Appearance : not in acute distress  Lungs : clear to auscultation, respirations regular  Heart :  regular rhythm & normal rate, normal S1, S2 and no murmur, no rub  Abdomen : soft, non distended  Extremities : no edema  Neurologic :   orientated to person, place, time and situation, Grossly no focal deficits  IJ TDC with exit site infection and tunnel infection    Laboratory Data :     Albumin Albumin   Date Value Ref Range Status   05/17/2023 3.7 3.5 - 5.2 g/dL Final   05/17/2023 3.6 3.5 - 5.2 g/dL Final      Magnesium Magnesium   Date Value Ref Range Status   05/18/2023 2.0 1.6 - 2.6 mg/dL Final          PTH               No results found for: PTH    CBC and coagulation:  Results from last 7 days   Lab Units 05/19/23  0126 05/18/23  0112 05/17/23  0316 05/17/23  0053   LACTATE mmol/L  --   --   --  0.7   SED RATE mm/hr  --   --   --  26*   CRP mg/dL  --   --   --  11.81*   WBC 10*3/mm3 8.86 10.16 12.81* 14.09*   HEMOGLOBIN g/dL 11.9* 11.8* 12.8* 12.2*   HEMATOCRIT % 36.3* 35.9* 40.4 38.6   MCV fL 98.4* 96.0 99.3* 99.7*   MCHC g/dL 32.8 32.9 31.7 31.6   PLATELETS 10*3/mm3 105* 128* 155 150   INR   --   --   --  1.17*     Acid/base balance:      Renal and electrolytes:    Results from last 7 days   Lab Units 05/19/23  0127  05/18/23  1721 05/18/23  0112 05/17/23 0316 05/17/23  0053   SODIUM mmol/L 133* 128* 133* 133* 135*   POTASSIUM mmol/L 4.7 4.1 4.4 5.2 5.1   MAGNESIUM mg/dL  --  2.0  --   --   --    CHLORIDE mmol/L 91* 85* 91* 98 99   CO2 mmol/L 29.1* 29.3* 29.1* 19.5* 21.5*   BUN mg/dL 43* 32* 46* 59* 58*   CREATININE mg/dL 8.19* 7.30* 8.56* 10.19* 10.65*   CALCIUM mg/dL 9.2 9.2 9.3 9.2 9.1     Estimated Creatinine Clearance: 13.9 mL/min (A) (by C-G formula based on SCr of 8.19 mg/dL (H)).  @GFRCG:3@   Liver and pancreatic function:  Results from last 7 days   Lab Units 05/17/23 0316 05/17/23 0053   ALBUMIN g/dL 3.7 3.6   BILIRUBIN mg/dL 0.4 0.4   ALK PHOS U/L 55 52   AST (SGOT) U/L 13 14   ALT (SGPT) U/L 7 6         Cardiac:      Liver and pancreatic function:  Results from last 7 days   Lab Units 05/17/23 0316 05/17/23 0053   ALBUMIN g/dL 3.7 3.6   BILIRUBIN mg/dL 0.4 0.4   ALK PHOS U/L 55 52   AST (SGOT) U/L 13 14   ALT (SGPT) U/L 7 6       Medications :     ALPRAZolam, 2 mg, Oral, Nightly  amLODIPine, 10 mg, Oral, Daily  atorvastatin, 40 mg, Oral, Nightly  budesonide-formoterol, 2 puff, Inhalation, BID - RT  bumetanide, 2 mg, Oral, Daily  buPROPion XL, 300 mg, Oral, QAM  carvedilol, 25 mg, Oral, BID With Meals  cloNIDine, 0.3 mg, Oral, TID  isosorbide mononitrate, 120 mg, Oral, Daily  linaclotide, 145 mcg, Oral, QAM AC  metOLazone, 10 mg, Oral, Daily  montelukast, 10 mg, Oral, Nightly  pantoprazole, 40 mg, Oral, Daily  rOPINIRole, 0.25 mg, Oral, TID  sodium chloride, 10 mL, Intravenous, Q12H  tamsulosin, 0.4 mg, Oral, Daily  Vancomycin Pharmacy Intermittent/Pulse Dosing, , Does not apply, Daily             Assessment & Plan     1. ESKD on IHDx  2. Sever sepsis likely due to tunneled dialysis cath infection  3. Anemia  4. SHPT    Pt had 2 back to back dialysis, and after TDC was removed, will keep off of dialysis and once culture sent on 5/19/23, negative, get TDC. No indication of emergent dialysis now  catheter.  If  patient do require dialysis in between then we have to put a temporary dialysis catheter.  Educated and counseled the patient      Jake Lala MD  05/19/23  08:09 EDT

## 2023-05-19 NOTE — PROGRESS NOTES
"    [facility]    S: Patient seen and examined.  Continues to feel improved.  No acute events overnight.  No CP    O: /86 (BP Location: Right arm, Patient Position: Lying)   Pulse 87   Temp 97.9 °F (36.6 °C) (Oral)   Resp 18   Ht 175.3 cm (69\")   Wt 116 kg (256 lb 1.6 oz)   SpO2 95%   BMI 37.82 kg/m²     GENERAL:  age appropriate, NAD  EARS,NOSE, MOUTH, THROAT:  MMM, hearing intact  EYES: PERRL, EOMI  CV:  NL S1/S2  RESPIRATORY:  CTAB no w/c/r  GI:  S/NT/ND +BS  SKIN: warm and dry  INT: No edema. No joint deformity.  PSYCH:  Normal affect, A+O x 3  NEURO: Alert and oriented, grossly nonfocal.      Scheduled Meds:ALPRAZolam, 2 mg, Oral, Nightly  amLODIPine, 10 mg, Oral, Daily  apixaban, 5 mg, Oral, BID  atorvastatin, 40 mg, Oral, Nightly  budesonide-formoterol, 2 puff, Inhalation, BID - RT  bumetanide, 2 mg, Oral, Daily  buPROPion XL, 300 mg, Oral, QAM  carvedilol, 25 mg, Oral, BID With Meals  cloNIDine, 0.3 mg, Oral, TID  isosorbide mononitrate, 120 mg, Oral, Daily  linaclotide, 145 mcg, Oral, QAM AC  metOLazone, 10 mg, Oral, Daily  montelukast, 10 mg, Oral, Nightly  pantoprazole, 40 mg, Oral, Daily  rOPINIRole, 0.25 mg, Oral, TID  sodium chloride, 10 mL, Intravenous, Q12H  tamsulosin, 0.4 mg, Oral, Daily  Vancomycin Pharmacy Intermittent/Pulse Dosing, , Does not apply, Daily      Continuous Infusions:   PRN Meds:.•  acetaminophen  •  albuterol  •  nitroglycerin  •  ondansetron  •  sodium chloride  •  sodium chloride  •  traMADol    Labs: Laboratory information reviewed and reconciled.    Results from last 7 days   Lab Units 05/19/23  0126   WBC 10*3/mm3 8.86         Results from last 7 days   Lab Units 05/19/23  0126   CO2 mmol/L 29.1*   BUN mg/dL 43*   CREATININE mg/dL 8.19*   GLUCOSE mg/dL 101*         Results from last 7 days   Lab Units 05/17/23  0053   INR  1.17*         Imaging (last 24 hours):    No radiology results for the last day   Assessment/Plan:  # " Sepsis 2/2 cellulitis/GPC bacteremia  - ID following, appreciate recs  - continue to follow blood cultures  - supportive care  - serial labs  - port removed 05/18  - NELIDA done 05/19, pending  - continue vanc  - repeat cultures 05/19    # ESRD  - nephro following, appreciate recs    # HTN  - continue home regimen  - monitor and titrate as needed    # COPD without exacerbation  - continue home regimen    # Chronic CHFpEf  - monitor volume status  - continue home regimen    # CAD  - conitnue home regimen    # PAF  - resume eliquis         DVT Prophylaxis:eliquis  Disposition: continue to monitor  Discharge disposition: TBD  Prognosis: guarded    Geovanny Mosquera Jr, MD  Rounding Hospitalist  05/19/23  11:01 EDT

## 2023-05-19 NOTE — PROGRESS NOTES
PROGRESS NOTE         Patient Identification:  Name:  Sudarshan Keene  Age:  48 y.o.  Sex:  male  :  1975  MRN:  [id number]  Visit Number:  [id number]  Primary Care Provider:  Marah Bain APRN         LOS: 2 days       ----------------------------------------------------------------------------------------------------------------------  Subjective       Chief Complaints:    Vascular Access Problem        Interval History:      The patient was in the Cath Lab this morning for NELIDA.  Status postdialysis catheter removal on 2023 with Dr. Luz.  Patient remains on room air and is afebrile.  WBC remains normal at 8.86.  3 out of 3 blood cultures on 2023 are so far showing MRSA.  Repeat blood cultures ordered today.    Review of Systems:    Patient out of room for NELIDA.  ----------------------------------------------------------------------------------------------------------------------      Objective       Current Hospital Meds:  ALPRAZolam, 2 mg, Oral, Nightly  amLODIPine, 10 mg, Oral, Daily  apixaban, 5 mg, Oral, BID  atorvastatin, 40 mg, Oral, Nightly  budesonide-formoterol, 2 puff, Inhalation, BID - RT  bumetanide, 2 mg, Oral, Daily  buPROPion XL, 300 mg, Oral, QAM  carvedilol, 25 mg, Oral, BID With Meals  cloNIDine, 0.3 mg, Oral, TID  isosorbide mononitrate, 120 mg, Oral, Daily  linaclotide, 145 mcg, Oral, QAM AC  metOLazone, 10 mg, Oral, Daily  montelukast, 10 mg, Oral, Nightly  pantoprazole, 40 mg, Oral, Daily  rOPINIRole, 0.25 mg, Oral, TID  sodium chloride, 10 mL, Intravenous, Q12H  tamsulosin, 0.4 mg, Oral, Daily  Vancomycin Pharmacy Intermittent/Pulse Dosing, , Does not apply, Daily         ----------------------------------------------------------------------------------------------------------------------    Vital Signs:  Temp:  [97.8 °F (36.6 °C)-98.7 °F (37.1 °C)] 97.9 °F (36.6 °C)  Heart Rate:  [74-93] 87  Resp:  [16-20] 18  BP: ()/(60-86) 133/86  Mean  Arterial Pressure (Non-Invasive) for the past 24 hrs (Last 3 readings):   Noninvasive MAP (mmHg)   05/19/23 1021 103   05/19/23 0632 81   05/19/23 0209 73     SpO2 Percentage    05/19/23 1002 05/19/23 1007 05/19/23 1021   SpO2: 96% 95% 95%     SpO2:  [94 %-98 %] 95 %  on   ;   Device (Oxygen Therapy): room air    Body mass index is 37.82 kg/m².  Wt Readings from Last 3 Encounters:   05/19/23 116 kg (256 lb 1.6 oz)   12/21/21 (!) 148 kg (326 lb)   08/04/20 (!) 166 kg (366 lb 11.2 oz)        Intake/Output Summary (Last 24 hours) at 5/19/2023 1031  Last data filed at 5/18/2023 1238  Gross per 24 hour   Intake 24 ml   Output 1500 ml   Net -1476 ml     Diet: Regular/House Diet, Renal Diets; Low Sodium (2-3g), Low Potassium, Low Phosphorus; Texture: Regular Texture (IDDSI 7); Fluid Consistency: Thin (IDDSI 0)  ----------------------------------------------------------------------------------------------------------------------      Physical Exam:    Patient out of room for NELIDA.  ----------------------------------------------------------------------------------------------------------------------            Results from last 7 days   Lab Units 05/19/23  0126 05/18/23  0112 05/17/23 0316 05/17/23 0053   CRP mg/dL  --   --   --  11.81*   LACTATE mmol/L  --   --   --  0.7   WBC 10*3/mm3 8.86 10.16 12.81* 14.09*   HEMOGLOBIN g/dL 11.9* 11.8* 12.8* 12.2*   HEMATOCRIT % 36.3* 35.9* 40.4 38.6   MCV fL 98.4* 96.0 99.3* 99.7*   MCHC g/dL 32.8 32.9 31.7 31.6   PLATELETS 10*3/mm3 105* 128* 155 150   INR   --   --   --  1.17*     Results from last 7 days   Lab Units 05/19/23 0126 05/18/23  1721 05/18/23 0112 05/17/23 0316 05/17/23 0053   SODIUM mmol/L 133* 128* 133* 133* 135*   POTASSIUM mmol/L 4.7 4.1 4.4 5.2 5.1   MAGNESIUM mg/dL  --  2.0  --   --   --    CHLORIDE mmol/L 91* 85* 91* 98 99   CO2 mmol/L 29.1* 29.3* 29.1* 19.5* 21.5*   BUN mg/dL 43* 32* 46* 59* 58*   CREATININE mg/dL 8.19* 7.30* 8.56* 10.19* 10.65*   CALCIUM  mg/dL 9.2 9.2 9.3 9.2 9.1   GLUCOSE mg/dL 101* 116* 100* 70 83   ALBUMIN g/dL  --   --   --  3.7 3.6   BILIRUBIN mg/dL  --   --   --  0.4 0.4   ALK PHOS U/L  --   --   --  55 52   AST (SGOT) U/L  --   --   --  13 14   ALT (SGPT) U/L  --   --   --  7 6   Estimated Creatinine Clearance: 13.9 mL/min (A) (by C-G formula based on SCr of 8.19 mg/dL (H)).  No results found for: AMMONIA    Glucose   Date/Time Value Ref Range Status   05/18/2023 1547 116 70 - 130 mg/dL Final     Comment:     Meter: [id number] : [id number] ARNOLDO TRAVIS     Lab Results   Component Value Date    HGBA1C 5.60 08/01/2020     Lab Results   Component Value Date    TSH 1.670 08/01/2020    FREET4 1.05 12/21/2015       Blood Culture   Date Value Ref Range Status   05/17/2023 Abnormal Stain (C)  Preliminary   05/17/2023 Abnormal Stain (C)  Preliminary   05/17/2023 Abnormal Stain (C)  Preliminary     No results found for: URINECX  No results found for: WOUNDCX  No results found for: STOOLCX  No results found for: RESPCX  Pain Management Panel           Latest Ref Rng & Units 12/21/2021 12/19/2021   Pain Management Panel   Creatinine, Urine mg/dL 48.3   64.1                    ----------------------------------------------------------------------------------------------------------------------  Imaging Results (Last 24 Hours)       ** No results found for the last 24 hours. **            ----------------------------------------------------------------------------------------------------------------------    Pertinent Infectious Disease Results    COVID-19 and influenza PCR negative.  Chest x-ray from 5/17/2023 reports mildly enlarged heart size, no lobar consolidation or edema, no pleural effusion or pneumothorax, left neck central vascular catheter with tip in the SVC/RA junction, left anterior chest port with catheter tip into the SVC.        Assessment/Plan       Assessment     MRSA bacteremia  Tunneled dialysis catheter infection status post  removal on 5/18/2023    Plan      The patient was out of his room this morning and in the Cath Lab for NELIDA.  Status postdialysis catheter removal on 5/18/2023 with Dr. Luz.  Patient remains on room air and is afebrile.  WBC remains normal at 8.86.  3 out of 3 blood cultures on 5/17/2023 are so far showing MRSA.      As tunneled dialysis catheter has now been removed, recommend a 72-hour period free from dialysis before inserting another catheter.  This break will also allow for additional maturation of AV fistula.  Recommend to continue on vancomycin pharmacy to dose for now.  Repeat blood cultures were ordered today.  NELIDA results are pending.    ANTIMICROBIAL THERAPY    Vancomycin Pharmacy Intermittent/Pulse Dosing     Code Status:   Code Status and Medical Interventions:   Ordered at: 05/17/23 0152     Code Status (Patient has no pulse and is not breathing):    CPR (Attempt to Resuscitate)     Medical Interventions (Patient has pulse or is breathing):    Full Support       Shayna Brand PA-C  05/19/23  10:31 EDT

## 2023-05-19 NOTE — ANESTHESIA PREPROCEDURE EVALUATION
Anesthesia Evaluation     Patient summary reviewed and Nursing notes reviewed   no history of anesthetic complications:  NPO Solid Status: > 8 hours  NPO Liquid Status: > 8 hours           Airway   Mallampati: II  TM distance: >3 FB  Neck ROM: full  No difficulty expected and Large neck circumference  Dental    (+) poor dentition    Pulmonary    (+) COPD, asthma,sleep apnea, decreased breath sounds,   Cardiovascular   Exercise tolerance: poor (<4 METS)    ECG reviewed  PT is on anticoagulation therapy  Patient on routine beta blocker and Beta blocker given within 24 hours of surgery  Rhythm: regular  Rate: normal    (+) hypertension, CAD, CHF , murmur,       Neuro/Psych- negative ROS  GI/Hepatic/Renal/Endo    (+) obesity, morbid obesity,  renal disease ARF,     Musculoskeletal     Abdominal   (+) obese,    Substance History   (+) drug use (+ THC)     OB/GYN negative ob/gyn ROS         Other   arthritis, blood dyscrasia anemia,                   Anesthesia Plan    ASA 4     general   total IV anesthesia  intravenous induction     Anesthetic plan, risks, benefits, and alternatives have been provided, discussed and informed consent has been obtained with: patient.  Pre-procedure education provided  Plan discussed with CRNA.        CODE STATUS:    Code Status (Patient has no pulse and is not breathing): CPR (Attempt to Resuscitate)  Medical Interventions (Patient has pulse or is breathing): Full Support

## 2023-05-19 NOTE — CASE MANAGEMENT/SOCIAL WORK
Continued Stay Note  [facility] [place]     Patient Name: Sudarshan Keene  MRN: [id number]  Today's Date: 5/19/2023    Admit Date: 5/16/2023     Discharge Plan     Row Name 05/19/23 1239       Plan    Plan Spoke with Lilibeth at [facility] in [place] and verified pt's HD schedule, they can give IV Vanco with HD when pt is discharged; will need order; this CM updated Lilibeth that tentative discharge plan is Monday; plan verified with pt via room phone, pt will call family or friend for private car transport home when medically stable; no further needs identified;  CM following.    Patient/Family in Agreement with Plan yes    Plan Comments TDC removed 5/18, 72 hour holiday before new TDC is placed; continued on IV Vanco; NPO for NELIDA this morning to r/o endocarditis; per ID's most recent note at time of review, blood cx to be repeated after TDC is removed.              Expected Discharge Date and Time     Expected Discharge Date Expected Discharge Time    May 22, 2023         Rebecca Kelley

## 2023-05-19 NOTE — ANESTHESIA POSTPROCEDURE EVALUATION
Patient: Sudarshan Keene    Procedure Summary     Date: 05/19/23 Room / Location: [facility] NONINVASIVE LAB    Anesthesia Start: 0931 Anesthesia Stop: 0951    Procedure: ADULT TRANSESOPHAGEAL ECHO (NELIDA) W/ CONT IF NECESSARY PER PROTOCOL Diagnosis:       (Endocarditis)      (Positive Blood Cultures)    Scheduled Providers: Andrews Sutton MD Provider: Jermaine Crystal DO    Anesthesia Type: general ASA Status: 4          Anesthesia Type: general    Vitals  Vitals Value Taken Time   /86 05/19/23 1007   Temp     Pulse 83 05/19/23 1007   Resp 16 05/19/23 1007   SpO2 95 % 05/19/23 1007           Post Anesthesia Care and Evaluation    Patient location during evaluation: bedside  Patient participation: complete - patient participated  Level of consciousness: sleepy but conscious  Pain score: 0  Pain management: adequate    Airway patency: patent  Anesthetic complications: No anesthetic complications  PONV Status: controlled  Cardiovascular status: acceptable  Respiratory status: acceptable and nasal cannula  Hydration status: euvolemic    Comments: Temp 98.7F    No anesthesia care post op

## 2023-05-20 LAB
ANION GAP SERPL CALCULATED.3IONS-SCNC: 19.4 MMOL/L (ref 5–15)
BACTERIA SPEC AEROBE CULT: ABNORMAL
BASOPHILS # BLD AUTO: 0.03 10*3/MM3 (ref 0–0.2)
BASOPHILS NFR BLD AUTO: 0.4 % (ref 0–1.5)
BUN SERPL-MCNC: 65 MG/DL (ref 6–20)
BUN/CREAT SERPL: 5.7 (ref 7–25)
CALCIUM SPEC-SCNC: 8.8 MG/DL (ref 8.6–10.5)
CHLORIDE SERPL-SCNC: 90 MMOL/L (ref 98–107)
CO2 SERPL-SCNC: 25.6 MMOL/L (ref 22–29)
CREAT SERPL-MCNC: 11.44 MG/DL (ref 0.76–1.27)
DEPRECATED RDW RBC AUTO: 55.1 FL (ref 37–54)
EGFRCR SERPLBLD CKD-EPI 2021: 5 ML/MIN/1.73
EOSINOPHIL # BLD AUTO: 0.36 10*3/MM3 (ref 0–0.4)
EOSINOPHIL NFR BLD AUTO: 4.8 % (ref 0.3–6.2)
ERYTHROCYTE [DISTWIDTH] IN BLOOD BY AUTOMATED COUNT: 15.4 % (ref 12.3–15.4)
GLUCOSE SERPL-MCNC: 102 MG/DL (ref 65–99)
GRAM STN SPEC: ABNORMAL
HCT VFR BLD AUTO: 34.4 % (ref 37.5–51)
HGB BLD-MCNC: 11.1 G/DL (ref 13–17.7)
IMM GRANULOCYTES # BLD AUTO: 0.02 10*3/MM3 (ref 0–0.05)
IMM GRANULOCYTES NFR BLD AUTO: 0.3 % (ref 0–0.5)
ISOLATED FROM: ABNORMAL
LYMPHOCYTES # BLD AUTO: 1.5 10*3/MM3 (ref 0.7–3.1)
LYMPHOCYTES NFR BLD AUTO: 19.9 % (ref 19.6–45.3)
MCH RBC QN AUTO: 31.3 PG (ref 26.6–33)
MCHC RBC AUTO-ENTMCNC: 32.3 G/DL (ref 31.5–35.7)
MCV RBC AUTO: 96.9 FL (ref 79–97)
MONOCYTES # BLD AUTO: 1.31 10*3/MM3 (ref 0.1–0.9)
MONOCYTES NFR BLD AUTO: 17.4 % (ref 5–12)
NEUTROPHILS NFR BLD AUTO: 4.3 10*3/MM3 (ref 1.7–7)
NEUTROPHILS NFR BLD AUTO: 57.2 % (ref 42.7–76)
NRBC BLD AUTO-RTO: 0 /100 WBC (ref 0–0.2)
PLATELET # BLD AUTO: 120 10*3/MM3 (ref 140–450)
PMV BLD AUTO: 10.3 FL (ref 6–12)
POTASSIUM SERPL-SCNC: 4.7 MMOL/L (ref 3.5–5.2)
RBC # BLD AUTO: 3.55 10*6/MM3 (ref 4.14–5.8)
SODIUM SERPL-SCNC: 135 MMOL/L (ref 136–145)
WBC NRBC COR # BLD: 7.52 10*3/MM3 (ref 3.4–10.8)

## 2023-05-20 PROCEDURE — 99232 SBSQ HOSP IP/OBS MODERATE 35: CPT | Performed by: PHYSICIAN ASSISTANT

## 2023-05-20 PROCEDURE — 85025 COMPLETE CBC W/AUTO DIFF WBC: CPT | Performed by: INTERNAL MEDICINE

## 2023-05-20 PROCEDURE — 94799 UNLISTED PULMONARY SVC/PX: CPT

## 2023-05-20 PROCEDURE — 80048 BASIC METABOLIC PNL TOTAL CA: CPT | Performed by: INTERNAL MEDICINE

## 2023-05-20 PROCEDURE — 99232 SBSQ HOSP IP/OBS MODERATE 35: CPT | Performed by: INTERNAL MEDICINE

## 2023-05-20 PROCEDURE — 94761 N-INVAS EAR/PLS OXIMETRY MLT: CPT

## 2023-05-20 RX ADMIN — BUMETANIDE 2 MG: 1 TABLET ORAL at 09:08

## 2023-05-20 RX ADMIN — ISOSORBIDE MONONITRATE 120 MG: 30 TABLET, EXTENDED RELEASE ORAL at 09:08

## 2023-05-20 RX ADMIN — PANTOPRAZOLE SODIUM 40 MG: 40 TABLET, DELAYED RELEASE ORAL at 09:07

## 2023-05-20 RX ADMIN — TAMSULOSIN HYDROCHLORIDE 0.4 MG: 0.4 CAPSULE ORAL at 09:07

## 2023-05-20 RX ADMIN — ROPINIROLE HYDROCHLORIDE 0.25 MG: 0.25 TABLET, FILM COATED ORAL at 21:54

## 2023-05-20 RX ADMIN — Medication 10 ML: at 21:55

## 2023-05-20 RX ADMIN — ROPINIROLE HYDROCHLORIDE 0.25 MG: 0.25 TABLET, FILM COATED ORAL at 09:07

## 2023-05-20 RX ADMIN — METOLAZONE 10 MG: 2.5 TABLET ORAL at 09:07

## 2023-05-20 RX ADMIN — BUPROPION HYDROCHLORIDE 300 MG: 150 TABLET, EXTENDED RELEASE ORAL at 06:00

## 2023-05-20 RX ADMIN — BUDESONIDE AND FORMOTEROL FUMARATE DIHYDRATE 2 PUFF: 160; 4.5 AEROSOL RESPIRATORY (INHALATION) at 18:37

## 2023-05-20 RX ADMIN — ROPINIROLE HYDROCHLORIDE 0.25 MG: 0.25 TABLET, FILM COATED ORAL at 15:31

## 2023-05-20 RX ADMIN — CARVEDILOL 25 MG: 25 TABLET, FILM COATED ORAL at 09:08

## 2023-05-20 RX ADMIN — ALPRAZOLAM 2 MG: 1 TABLET ORAL at 21:54

## 2023-05-20 RX ADMIN — APIXABAN 5 MG: 5 TABLET, FILM COATED ORAL at 09:08

## 2023-05-20 RX ADMIN — Medication 10 ML: at 09:08

## 2023-05-20 RX ADMIN — APIXABAN 5 MG: 5 TABLET, FILM COATED ORAL at 21:54

## 2023-05-20 RX ADMIN — CLONIDINE HYDROCHLORIDE 0.3 MG: 0.3 TABLET ORAL at 09:09

## 2023-05-20 RX ADMIN — AMLODIPINE BESYLATE 10 MG: 10 TABLET ORAL at 09:08

## 2023-05-20 RX ADMIN — MONTELUKAST SODIUM 10 MG: 10 TABLET, COATED ORAL at 21:54

## 2023-05-20 RX ADMIN — ATORVASTATIN CALCIUM 40 MG: 40 TABLET, FILM COATED ORAL at 21:54

## 2023-05-20 NOTE — PROGRESS NOTES
Nephrology Progress Note      Subjective     Patient denies any shortness of breath no chest pain no urgency no frequencyObjective       Vital signs :     Temp:  [97.6 °F (36.4 °C)-98.6 °F (37 °C)] 98.6 °F (37 °C)  Heart Rate:  [83-96] 84  Resp:  [18] 18  BP: ()/(45-88) 106/62    Intake/Output                             05/18/23 0701 - 05/19/23 0700 05/19/23 0701 - 05/20/23 0700 05/20/23 0701 - 05/21/23 0700     2313-4075 7494-1451 Total 8739-3163 6559-6564 Total 9394-5751 2565-7547 Total                    Intake    P.O.  24  -- 24  500  -- 500  240  -- 240    Total Intake 24 -- 24 500 -- 500 240 -- 240       Output    Other  1500  -- 1500  --  -- --  --  -- --    Ultrafiltration (mL) 1500 -- 1500 -- -- -- -- -- --    Total Output 1500 -- 1500 -- -- -- -- -- --           Physical Exam:    General Appearance : not in acute distress  Lungs : clear to auscultation, respirations regular  Heart :  regular rhythm & normal rate, normal S1, S2 and no murmur, no rub  Abdomen : soft, non distended  Extremities : no edema  Neurologic :   orientated to person, place, time and situation, Grossly no focal deficits  IJ TDC with exit site infection and tunnel infection    Laboratory Data :     Albumin No results found for: ALBUMIN   Magnesium Magnesium   Date Value Ref Range Status   05/18/2023 2.0 1.6 - 2.6 mg/dL Final          PTH               No results found for: PTH    CBC and coagulation:  Results from last 7 days   Lab Units 05/20/23  0247 05/19/23  0126 05/18/23  0112 05/17/23  0316 05/17/23  0053   LACTATE mmol/L  --   --   --   --  0.7   SED RATE mm/hr  --   --   --   --  26*   CRP mg/dL  --   --   --   --  11.81*   WBC 10*3/mm3 7.52 8.86 10.16   < > 14.09*   HEMOGLOBIN g/dL 11.1* 11.9* 11.8*   < > 12.2*   HEMATOCRIT % 34.4* 36.3* 35.9*   < > 38.6   MCV fL 96.9 98.4* 96.0   < > 99.7*   MCHC g/dL 32.3 32.8 32.9   < > 31.6   PLATELETS 10*3/mm3 120* 105* 128*   < > 150   INR   --   --   --   --  1.17*    < > =  values in this interval not displayed.     Acid/base balance:      Renal and electrolytes:    Results from last 7 days   Lab Units 05/20/23  0247 05/19/23  0126 05/18/23  1721 05/18/23  0112 05/17/23  0316   SODIUM mmol/L 135* 133* 128* 133* 133*   POTASSIUM mmol/L 4.7 4.7 4.1 4.4 5.2   MAGNESIUM mg/dL  --   --  2.0  --   --    CHLORIDE mmol/L 90* 91* 85* 91* 98   CO2 mmol/L 25.6 29.1* 29.3* 29.1* 19.5*   BUN mg/dL 65* 43* 32* 46* 59*   CREATININE mg/dL 11.44* 8.19* 7.30* 8.56* 10.19*   CALCIUM mg/dL 8.8 9.2 9.2 9.3 9.2     Estimated Creatinine Clearance: 9.9 mL/min (A) (by C-G formula based on SCr of 11.44 mg/dL (H)).  @GFRCG:3@   Liver and pancreatic function:  Results from last 7 days   Lab Units 05/17/23  0316 05/17/23  0053   ALBUMIN g/dL 3.7 3.6   BILIRUBIN mg/dL 0.4 0.4   ALK PHOS U/L 55 52   AST (SGOT) U/L 13 14   ALT (SGPT) U/L 7 6         Cardiac:      Liver and pancreatic function:  Results from last 7 days   Lab Units 05/17/23  0316 05/17/23  0053   ALBUMIN g/dL 3.7 3.6   BILIRUBIN mg/dL 0.4 0.4   ALK PHOS U/L 55 52   AST (SGOT) U/L 13 14   ALT (SGPT) U/L 7 6       Medications :     ALPRAZolam, 2 mg, Oral, Nightly  amLODIPine, 10 mg, Oral, Daily  apixaban, 5 mg, Oral, BID  atorvastatin, 40 mg, Oral, Nightly  budesonide-formoterol, 2 puff, Inhalation, BID - RT  bumetanide, 2 mg, Oral, Daily  buPROPion XL, 300 mg, Oral, QAM  carvedilol, 25 mg, Oral, BID With Meals  cloNIDine, 0.3 mg, Oral, TID  isosorbide mononitrate, 120 mg, Oral, Daily  linaclotide, 145 mcg, Oral, QAM AC  metOLazone, 10 mg, Oral, Daily  montelukast, 10 mg, Oral, Nightly  pantoprazole, 40 mg, Oral, Daily  rOPINIRole, 0.25 mg, Oral, TID  sodium chloride, 10 mL, Intravenous, Q12H  tamsulosin, 0.4 mg, Oral, Daily  Vancomycin Pharmacy Intermittent/Pulse Dosing, , Does not apply, Daily             Assessment & Plan     1. ESKD on IHDx  2. Sever sepsis likely due to tunneled dialysis cath infection  3. Anemia  4. SHPT    Pt had 2 back to back  dialysis, and after TDC was removed, blood cultures negative on 19th will keep off of dialysis and will plan to do a tunneled dialysis catheter on Monday. No indication of emergent dialysis now  catheter.  If patient do require dialysis in between then we have to put a temporary dialysis catheter.  Educated and counseled the patient      S Galen Shultz MD  05/20/23  10:37 EDT

## 2023-05-20 NOTE — PROGRESS NOTES
"    [facility]    S: Patient seen and examined.  No new complaints.  No CP or SOB    O: /88 (BP Location: Left arm, Patient Position: Lying)   Pulse 86   Temp 97.6 °F (36.4 °C) (Oral)   Resp 18   Ht 175.3 cm (69.02\")   Wt 116 kg (255 lb 6.4 oz)   SpO2 93%   BMI 37.70 kg/m²     GENERAL:  age appropriate, NAD  EARS,NOSE, MOUTH, THROAT:  MMM, hearing intact  EYES: PERRL, EOMI  CV:  NL S1/S2  RESPIRATORY:  CTAB no w/c/r  GI:  S/NT/ND +BS  SKIN: warm and dry  INT: No edema. No joint deformity.  PSYCH:  Normal affect, A+O x 3  NEURO: Alert and oriented, grossly nonfocal.      Scheduled Meds:ALPRAZolam, 2 mg, Oral, Nightly  amLODIPine, 10 mg, Oral, Daily  apixaban, 5 mg, Oral, BID  atorvastatin, 40 mg, Oral, Nightly  budesonide-formoterol, 2 puff, Inhalation, BID - RT  bumetanide, 2 mg, Oral, Daily  buPROPion XL, 300 mg, Oral, QAM  carvedilol, 25 mg, Oral, BID With Meals  cloNIDine, 0.3 mg, Oral, TID  isosorbide mononitrate, 120 mg, Oral, Daily  linaclotide, 145 mcg, Oral, QAM AC  metOLazone, 10 mg, Oral, Daily  montelukast, 10 mg, Oral, Nightly  pantoprazole, 40 mg, Oral, Daily  rOPINIRole, 0.25 mg, Oral, TID  sodium chloride, 10 mL, Intravenous, Q12H  tamsulosin, 0.4 mg, Oral, Daily  Vancomycin Pharmacy Intermittent/Pulse Dosing, , Does not apply, Daily    Results from last 7 d    Lab Units•  acetaminophen  •  albuterol  •  nitroglycerin  •  ondansetron  •  sodium chloride  •  sodium chloride  •  traMADol 05/19/23  0126   CO2 mmol/L 29.1*   BUN mg/dL 43*   CREATININE mg/dL 8.19*   GLUCOSE mg/dL 101*           INR  1.17*         Imaging (last 24 hours):    No radi  Results from last 7 days   Lab Units 05/20/23  0247   WBC 10*3/mm3 7.52   s/G  Results from last 7 days   Lab Units 05/20/23  0247   CO2 mmol/L 25.6   BUN mg/dL 65*   CREATININE mg/dL 11.44*   GLUCOSE mg/dL 102*     Assessment/Plan:  # Sepsis 2/2 cellulitis/MRSA bacteremia  - ID following, appreciate recs  - " continue to follow blood cultures  - supportive care  - serial labs  - port removed 05/18  - NELIDA done 05/19, no sign of infection  - continue vanc  - repeat cultures 05/19    # ESRD  - nephro following, appreciate recs  - place temp dialysis cath after 72 hour vacation with abx     # HTN  - continue home regimen  - monitor and titrate as needed     # COPD without exacerbation  - continue home regimen     # Chronic CHFpEf  - monitor volume status  - continue home regimen     # CAD  - conitnue home regimen     # PAF  - resume eliquis         DVT Prophylaxis:eliquis  Disposition: continue to monitor  Discharge disposition: TBD  Prognosis: guarded  Geovanny Mosquera Jr, MD  Rounding Hospitalist  05/20/23  10:14 EDT

## 2023-05-20 NOTE — PROGRESS NOTES
PROGRESS NOTE         Patient Identification:  Name:  Sudarshan Keene  Age:  48 y.o.  Sex:  male  :  1975  MRN:  [id number]  Visit Number:  [id number]  Primary Care Provider:  Marah Bain APRN         LOS: 3 days       ----------------------------------------------------------------------------------------------------------------------  Subjective       Chief Complaints:    Vascular Access Problem        Interval History:      Patient is sitting up in bed on room air no apparent distress.  No new issues or complaints today. Lungs are clear to auscultation bilaterally.  Abdomen is soft, nontender, with normal bowel sounds.  Tunneled dialysis catheter was removed on 2023. Afebrile, no diarrhea.  WBC remains normal at 7.52.  3 out of 3 blood cultures on 2023 finalized as MRSA.  Repeat blood cultures are in progress.  NELIDA on 2023 made no mention of vegetations or abscesses.    Review of Systems:    Constitutional: no fever, chills and night sweats.  Generalized fatigue.  Eyes: no eye drainage, itching or redness.  HEENT: no mouth sores, dysphagia or nose bleed.  Respiratory: no for shortness of breath, cough or production of sputum.  Cardiovascular: no chest pain, no palpitations, no orthopnea.  Gastrointestinal: no nausea, vomiting, or diarrhea. No abdominal pain, hematemesis or rectal bleeding.   Genitourinary: no dysuria or polyuria.  Hematologic/lymphatic: no lymph node abnormalities, no easy bruising or easy bleeding.  Musculoskeletal: no muscle or joint pain.  Skin: No rash and no itching.  Neurological: no loss of consciousness, no seizure, no headache.  Behavioral/Psych: no depression or suicidal ideation.  Endocrine: no hot flashes.  Immunologic: negative.     ----------------------------------------------------------------------------------------------------------------------      Objective       Current Hospital Meds:  ALPRAZolam, 2 mg, Oral,  Nightly  amLODIPine, 10 mg, Oral, Daily  apixaban, 5 mg, Oral, BID  atorvastatin, 40 mg, Oral, Nightly  budesonide-formoterol, 2 puff, Inhalation, BID - RT  bumetanide, 2 mg, Oral, Daily  buPROPion XL, 300 mg, Oral, QAM  carvedilol, 25 mg, Oral, BID With Meals  cloNIDine, 0.3 mg, Oral, TID  isosorbide mononitrate, 120 mg, Oral, Daily  linaclotide, 145 mcg, Oral, QAM AC  metOLazone, 10 mg, Oral, Daily  montelukast, 10 mg, Oral, Nightly  pantoprazole, 40 mg, Oral, Daily  rOPINIRole, 0.25 mg, Oral, TID  sodium chloride, 10 mL, Intravenous, Q12H  tamsulosin, 0.4 mg, Oral, Daily  Vancomycin Pharmacy Intermittent/Pulse Dosing, , Does not apply, Daily         ----------------------------------------------------------------------------------------------------------------------    Vital Signs:  Temp:  [97.6 °F (36.4 °C)-97.9 °F (36.6 °C)] 97.6 °F (36.4 °C)  Heart Rate:  [83-96] 86  Resp:  [18] 18  BP: ()/(45-88) 149/88  Mean Arterial Pressure (Non-Invasive) for the past 24 hrs (Last 3 readings):   Noninvasive MAP (mmHg)   05/20/23 0703 107   05/20/23 0230 73   05/19/23 2248 78     SpO2 Percentage    05/20/23 0230 05/20/23 0626 05/20/23 0703   SpO2: 94% 93% 93%     SpO2:  [93 %-99 %] 93 %  on   ;   Device (Oxygen Therapy): room air    Body mass index is 37.7 kg/m².  Wt Readings from Last 3 Encounters:   05/20/23 116 kg (255 lb 6.4 oz)   12/21/21 (!) 148 kg (326 lb)   08/04/20 (!) 166 kg (366 lb 11.2 oz)        Intake/Output Summary (Last 24 hours) at 5/20/2023 1018  Last data filed at 5/19/2023 1412  Gross per 24 hour   Intake 500 ml   Output --   Net 500 ml     Diet: Regular/House Diet, Renal Diets; Low Sodium (2-3g), Low Potassium, Low Phosphorus; Texture: Regular Texture (IDDSI 7); Fluid Consistency: Thin (IDDSI 0)  ----------------------------------------------------------------------------------------------------------------------      Physical Exam:    Constitutional: Well-developed and well-nourished black male  is sitting up in bed on room air in no apparent distress.  HENT:  Head: Normocephalic and atraumatic.  Mouth:  Moist mucous membranes.    Eyes:  Conjunctivae and EOM are normal.  No scleral icterus.  Neck:  Neck supple.  No JVD present.    Cardiovascular:  Normal rate, regular rhythm and normal heart sounds with no murmur. No edema.  Pulmonary/Chest:  No respiratory distress, no wheezes, no crackles, with normal breath sounds and good air movement.   Abdominal:  Soft.  Bowel sounds are normal.  No distension and no tenderness.   Musculoskeletal:  No edema, no tenderness, and no deformity.  No swelling or redness of joints.  Neurological:  Alert and oriented to person, place, and time.  No facial droop.  No slurred speech.   Skin:  Skin is warm and dry.  No rash noted.  No pallor.  Left AV fistula, not mature at this time.  Psychiatric:  Normal mood and affect.  Behavior is normal.    ----------------------------------------------------------------------------------------------------------------------            Results from last 7 days   Lab Units 05/20/23  0247 05/19/23  0126 05/18/23  0112 05/17/23  0316 05/17/23  0053   CRP mg/dL  --   --   --   --  11.81*   LACTATE mmol/L  --   --   --   --  0.7   WBC 10*3/mm3 7.52 8.86 10.16   < > 14.09*   HEMOGLOBIN g/dL 11.1* 11.9* 11.8*   < > 12.2*   HEMATOCRIT % 34.4* 36.3* 35.9*   < > 38.6   MCV fL 96.9 98.4* 96.0   < > 99.7*   MCHC g/dL 32.3 32.8 32.9   < > 31.6   PLATELETS 10*3/mm3 120* 105* 128*   < > 150   INR   --   --   --   --  1.17*    < > = values in this interval not displayed.     Results from last 7 days   Lab Units 05/20/23  0247 05/19/23  0126 05/18/23  1721 05/18/23  0112 05/17/23  0316 05/17/23  0053   SODIUM mmol/L 135* 133* 128*   < > 133* 135*   POTASSIUM mmol/L 4.7 4.7 4.1   < > 5.2 5.1   MAGNESIUM mg/dL  --   --  2.0  --   --   --    CHLORIDE mmol/L 90* 91* 85*   < > 98 99   CO2 mmol/L 25.6 29.1* 29.3*   < > 19.5* 21.5*   BUN mg/dL 65* 43* 32*   < >  59* 58*   CREATININE mg/dL 11.44* 8.19* 7.30*   < > 10.19* 10.65*   CALCIUM mg/dL 8.8 9.2 9.2   < > 9.2 9.1   GLUCOSE mg/dL 102* 101* 116*   < > 70 83   ALBUMIN g/dL  --   --   --   --  3.7 3.6   BILIRUBIN mg/dL  --   --   --   --  0.4 0.4   ALK PHOS U/L  --   --   --   --  55 52   AST (SGOT) U/L  --   --   --   --  13 14   ALT (SGPT) U/L  --   --   --   --  7 6    < > = values in this interval not displayed.   Estimated Creatinine Clearance: 9.9 mL/min (A) (by C-G formula based on SCr of 11.44 mg/dL (H)).  No results found for: AMMONIA    Glucose   Date/Time Value Ref Range Status   05/18/2023 1547 116 70 - 130 mg/dL Final     Comment:     Meter: [id number] : [id number] ARNOLDO TRAVIS     Lab Results   Component Value Date    HGBA1C 5.60 08/01/2020     Lab Results   Component Value Date    TSH 1.670 08/01/2020    FREET4 1.05 12/21/2015       Blood Culture   Date Value Ref Range Status   05/17/2023 Abnormal Stain (C)  Preliminary   05/17/2023 Abnormal Stain (C)  Preliminary   05/17/2023 Abnormal Stain (C)  Preliminary     No results found for: URINECX  No results found for: WOUNDCX  No results found for: STOOLCX  No results found for: RESPCX  Pain Management Panel           Latest Ref Rng & Units 12/21/2021 12/19/2021   Pain Management Panel   Creatinine, Urine mg/dL 48.3   64.1                    ----------------------------------------------------------------------------------------------------------------------  Imaging Results (Last 24 Hours)       ** No results found for the last 24 hours. **            ----------------------------------------------------------------------------------------------------------------------    Pertinent Infectious Disease Results    COVID-19 and influenza PCR negative.  Chest x-ray from 5/17/2023 reports mildly enlarged heart size, no lobar consolidation or edema, no pleural effusion or pneumothorax, left neck central vascular catheter with tip in the SVC/RA junction, left  anterior chest port with catheter tip into the SVC.      Assessment/Plan       Assessment     MRSA bacteremia  Tunneled dialysis catheter infection status post removal on 5/18/2023    Plan      I examined the patient this morning and he is sitting up in bed on room air no apparent distress.  No new issues or complaints today.  Lungs are clear to auscultation bilaterally.  Abdomen is soft, nontender, with normal bowel sounds.  Tunneled dialysis catheter was removed on 5/18/2023.  Afebrile, no diarrhea.  WBC remains normal at 7.52.  3 out of 3 blood cultures on 5/17/2023 finalized as MRSA.  Repeat blood cultures are in progress.  NELIDA on 5/19/2023 made no mention of vegetations or abscesses.    Status postdialysis catheter removal on 5/18/2023 with Dr. Luz.     As tunneled dialysis catheter has now been removed, recommend a 72-hour period free from dialysis before inserting another catheter.  This break will allow for additional maturation of AV fistula.  Recommend to continue on vancomycin per pharmacy dosing for now.  Repeat blood cultures remain in progress.    ANTIMICROBIAL THERAPY    Vancomycin Pharmacy Intermittent/Pulse Dosing     Code Status:   Code Status and Medical Interventions:   Ordered at: 05/17/23 0152     Code Status (Patient has no pulse and is not breathing):    CPR (Attempt to Resuscitate)     Medical Interventions (Patient has pulse or is breathing):    Full Support       Shayna Brand PA-C  05/20/23  10:18 EDT

## 2023-05-20 NOTE — PLAN OF CARE
Goal Outcome Evaluation:    Pt is resting in bed with no s/s of distress noted. VSS. Port maintained, dressing is C/D/I. Will continue with plan of care.

## 2023-05-21 LAB
ALBUMIN SERPL-MCNC: 3.4 G/DL (ref 3.5–5.2)
ALBUMIN/GLOB SERPL: 1.2 G/DL
ALP SERPL-CCNC: 51 U/L (ref 39–117)
ALT SERPL W P-5'-P-CCNC: 8 U/L (ref 1–41)
ANION GAP SERPL CALCULATED.3IONS-SCNC: 20.3 MMOL/L (ref 5–15)
AST SERPL-CCNC: 10 U/L (ref 1–40)
BILIRUB SERPL-MCNC: 0.3 MG/DL (ref 0–1.2)
BUN SERPL-MCNC: 86 MG/DL (ref 6–20)
BUN/CREAT SERPL: 6.3 (ref 7–25)
CALCIUM SPEC-SCNC: 9.1 MG/DL (ref 8.6–10.5)
CHLORIDE SERPL-SCNC: 90 MMOL/L (ref 98–107)
CO2 SERPL-SCNC: 23.7 MMOL/L (ref 22–29)
CREAT SERPL-MCNC: 13.56 MG/DL (ref 0.76–1.27)
EGFRCR SERPLBLD CKD-EPI 2021: 4.1 ML/MIN/1.73
GLOBULIN UR ELPH-MCNC: 2.9 GM/DL
GLUCOSE SERPL-MCNC: 109 MG/DL (ref 65–99)
POTASSIUM SERPL-SCNC: 5.2 MMOL/L (ref 3.5–5.2)
PROT SERPL-MCNC: 6.3 G/DL (ref 6–8.5)
SODIUM SERPL-SCNC: 134 MMOL/L (ref 136–145)

## 2023-05-21 PROCEDURE — 99232 SBSQ HOSP IP/OBS MODERATE 35: CPT | Performed by: PHYSICIAN ASSISTANT

## 2023-05-21 PROCEDURE — 94799 UNLISTED PULMONARY SVC/PX: CPT

## 2023-05-21 PROCEDURE — 99221 1ST HOSP IP/OBS SF/LOW 40: CPT | Performed by: SURGERY

## 2023-05-21 PROCEDURE — 80053 COMPREHEN METABOLIC PANEL: CPT | Performed by: INTERNAL MEDICINE

## 2023-05-21 PROCEDURE — 94761 N-INVAS EAR/PLS OXIMETRY MLT: CPT

## 2023-05-21 PROCEDURE — 99232 SBSQ HOSP IP/OBS MODERATE 35: CPT | Performed by: INTERNAL MEDICINE

## 2023-05-21 RX ADMIN — AMLODIPINE BESYLATE 10 MG: 10 TABLET ORAL at 09:15

## 2023-05-21 RX ADMIN — METOLAZONE 10 MG: 2.5 TABLET ORAL at 09:14

## 2023-05-21 RX ADMIN — ATORVASTATIN CALCIUM 40 MG: 40 TABLET, FILM COATED ORAL at 20:03

## 2023-05-21 RX ADMIN — TAMSULOSIN HYDROCHLORIDE 0.4 MG: 0.4 CAPSULE ORAL at 09:15

## 2023-05-21 RX ADMIN — CLONIDINE HYDROCHLORIDE 0.3 MG: 0.3 TABLET ORAL at 09:15

## 2023-05-21 RX ADMIN — MONTELUKAST SODIUM 10 MG: 10 TABLET, COATED ORAL at 20:03

## 2023-05-21 RX ADMIN — ISOSORBIDE MONONITRATE 120 MG: 30 TABLET, EXTENDED RELEASE ORAL at 09:14

## 2023-05-21 RX ADMIN — ALPRAZOLAM 2 MG: 1 TABLET ORAL at 20:03

## 2023-05-21 RX ADMIN — SODIUM ZIRCONIUM CYCLOSILICATE 10 G: 10 POWDER, FOR SUSPENSION ORAL at 20:03

## 2023-05-21 RX ADMIN — CARVEDILOL 25 MG: 25 TABLET, FILM COATED ORAL at 09:15

## 2023-05-21 RX ADMIN — CLONIDINE HYDROCHLORIDE 0.3 MG: 0.3 TABLET ORAL at 00:28

## 2023-05-21 RX ADMIN — SODIUM ZIRCONIUM CYCLOSILICATE 10 G: 10 POWDER, FOR SUSPENSION ORAL at 15:16

## 2023-05-21 RX ADMIN — Medication 10 ML: at 20:03

## 2023-05-21 RX ADMIN — APIXABAN 5 MG: 5 TABLET, FILM COATED ORAL at 09:15

## 2023-05-21 RX ADMIN — PANTOPRAZOLE SODIUM 40 MG: 40 TABLET, DELAYED RELEASE ORAL at 09:14

## 2023-05-21 RX ADMIN — ROPINIROLE HYDROCHLORIDE 0.25 MG: 0.25 TABLET, FILM COATED ORAL at 15:17

## 2023-05-21 RX ADMIN — BUPROPION HYDROCHLORIDE 300 MG: 150 TABLET, EXTENDED RELEASE ORAL at 06:00

## 2023-05-21 RX ADMIN — ROPINIROLE HYDROCHLORIDE 0.25 MG: 0.25 TABLET, FILM COATED ORAL at 20:03

## 2023-05-21 RX ADMIN — BUMETANIDE 2 MG: 1 TABLET ORAL at 09:14

## 2023-05-21 RX ADMIN — Medication 10 ML: at 09:15

## 2023-05-21 RX ADMIN — BUDESONIDE AND FORMOTEROL FUMARATE DIHYDRATE 2 PUFF: 160; 4.5 AEROSOL RESPIRATORY (INHALATION) at 19:39

## 2023-05-21 RX ADMIN — ROPINIROLE HYDROCHLORIDE 0.25 MG: 0.25 TABLET, FILM COATED ORAL at 09:15

## 2023-05-21 RX ADMIN — BUDESONIDE AND FORMOTEROL FUMARATE DIHYDRATE 2 PUFF: 160; 4.5 AEROSOL RESPIRATORY (INHALATION) at 06:55

## 2023-05-21 NOTE — PROGRESS NOTES
Nephrology Progress Note      Subjective     Patient denies any fever patient denies any shortness of breath no chest pain no urge/ frequencyObjective       Vital signs :     Temp:  [97.2 °F (36.2 °C)-98 °F (36.7 °C)] 97.8 °F (36.6 °C)  Heart Rate:  [79-94] 79  Resp:  [16-18] 18  BP: (101-154)/(60-93) 150/85    Intake/Output                       05/19/23 0701 - 05/20/23 0700 05/20/23 0701 - 05/21/23 0700     [id number] 4490-3896 Total 6903-0551 9764-1014 Total                 Intake    P.O.  500  -- 500  1080  -- 1080    Total Intake 500 -- 500 1080 -- 1080       Output    Total Output -- -- -- -- -- --           Physical Exam:    General Appearance : not in acute distress  Lungs : clear to auscultation, respirations regular  Heart :  regular rhythm & normal rate, normal S1, S2 and no murmur, no rub  Abdomen : soft, non distended  Extremities : no edema  Neurologic :   orientated to person, place, time and situation, Grossly no focal deficits  IJ TDC with exit site infection and tunnel infection    Laboratory Data :     Albumin Albumin   Date Value Ref Range Status   05/21/2023 3.4 (L) 3.5 - 5.2 g/dL Final      Magnesium Magnesium   Date Value Ref Range Status   05/18/2023 2.0 1.6 - 2.6 mg/dL Final          PTH               No results found for: PTH    CBC and coagulation:  Results from last 7 days   Lab Units 05/20/23  0247 05/19/23  0126 05/18/23  0112 05/17/23  0316 05/17/23  0053   LACTATE mmol/L  --   --   --   --  0.7   SED RATE mm/hr  --   --   --   --  26*   CRP mg/dL  --   --   --   --  11.81*   WBC 10*3/mm3 7.52 8.86 10.16   < > 14.09*   HEMOGLOBIN g/dL 11.1* 11.9* 11.8*   < > 12.2*   HEMATOCRIT % 34.4* 36.3* 35.9*   < > 38.6   MCV fL 96.9 98.4* 96.0   < > 99.7*   MCHC g/dL 32.3 32.8 32.9   < > 31.6   PLATELETS 10*3/mm3 120* 105* 128*   < > 150   INR   --   --   --   --  1.17*    < > = values in this interval not displayed.     Acid/base balance:      Renal and electrolytes:    Results from last 7 days    Lab Units 05/21/23  0540 05/20/23  0247 05/19/23  0126 05/18/23  1721 05/18/23  0112   SODIUM mmol/L 134* 135* 133* 128* 133*   POTASSIUM mmol/L 5.2 4.7 4.7 4.1 4.4   MAGNESIUM mg/dL  --   --   --  2.0  --    CHLORIDE mmol/L 90* 90* 91* 85* 91*   CO2 mmol/L 23.7 25.6 29.1* 29.3* 29.1*   BUN mg/dL 86* 65* 43* 32* 46*   CREATININE mg/dL 13.56* 11.44* 8.19* 7.30* 8.56*   CALCIUM mg/dL 9.1 8.8 9.2 9.2 9.3     Estimated Creatinine Clearance: 8.4 mL/min (A) (by C-G formula based on SCr of 13.56 mg/dL (H)).  @GFRCG:3@   Liver and pancreatic function:  Results from last 7 days   Lab Units 05/21/23  0540 05/17/23  0316 05/17/23  0053   ALBUMIN g/dL 3.4* 3.7 3.6   BILIRUBIN mg/dL 0.3 0.4 0.4   ALK PHOS U/L 51 55 52   AST (SGOT) U/L 10 13 14   ALT (SGPT) U/L 8 7 6         Cardiac:      Liver and pancreatic function:  Results from last 7 days   Lab Units 05/21/23  0540 05/17/23  0316 05/17/23  0053   ALBUMIN g/dL 3.4* 3.7 3.6   BILIRUBIN mg/dL 0.3 0.4 0.4   ALK PHOS U/L 51 55 52   AST (SGOT) U/L 10 13 14   ALT (SGPT) U/L 8 7 6       Medications :     ALPRAZolam, 2 mg, Oral, Nightly  amLODIPine, 10 mg, Oral, Daily  apixaban, 5 mg, Oral, BID  atorvastatin, 40 mg, Oral, Nightly  budesonide-formoterol, 2 puff, Inhalation, BID - RT  bumetanide, 2 mg, Oral, Daily  buPROPion XL, 300 mg, Oral, QAM  carvedilol, 25 mg, Oral, BID With Meals  cloNIDine, 0.3 mg, Oral, TID  isosorbide mononitrate, 120 mg, Oral, Daily  linaclotide, 145 mcg, Oral, QAM AC  metOLazone, 10 mg, Oral, Daily  montelukast, 10 mg, Oral, Nightly  pantoprazole, 40 mg, Oral, Daily  rOPINIRole, 0.25 mg, Oral, TID  sodium chloride, 10 mL, Intravenous, Q12H  tamsulosin, 0.4 mg, Oral, Daily  Vancomycin Pharmacy Intermittent/Pulse Dosing, , Does not apply, Daily             Assessment & Plan     1. ESKD on IHDx  2. Sever sepsis likely due to tunneled dialysis cath infection  3. Anemia  4. SHPT  5.  Hyperkalemia    Pt had 2 back to back dialysis, and after TDC was removed,  blood culture from 19,000 still negative we will plan to consult surgery for tunneled dialysis catheter tomorrow morning patient has mild hyperkalemia we will start patient on Lokelma. No indication of emergent dialysis now  catheter.  If patient do require dialysis in between then we have to put a temporary dialysis catheter.  Educated and counseled the patient    Plan of care was discussed with the patient he understood verbalized agreed    S Galen Shultz MD  05/21/23  10:58 EDT

## 2023-05-21 NOTE — CONSULTS
[facility]   Consult Note    Patient Name: Sudarshan Keene  : 1975  MRN: [id number]  Primary Care Physician:  Marah Bain APRN  Referring Physician: No Known Provider  Date of admission: 2023    Consults  Subjective   Subjective     Reason for Consult/ Chief Complaint: Need for tunneled hemodialysis access    History of Present Illness  Sudarshan Keene is a 48 y.o. male with recent tunneled hemodialysis catheter removal due to bacteremia and infection.  Endocarditis has been excluded with NELIDA.  Patient is now ready for replacement tunneled hemodialysis catheter.    Review of Systems   Constitutional: Negative for activity change, appetite change, chills and fever.   HENT: Negative for sore throat and trouble swallowing.    Eyes: Negative for visual disturbance.   Respiratory: Negative for cough and shortness of breath.    Cardiovascular: Negative for chest pain and palpitations.   Gastrointestinal: Negative for abdominal distention, abdominal pain, blood in stool, constipation, diarrhea, nausea and vomiting.   Endocrine: Negative for cold intolerance and heat intolerance.   Genitourinary: Negative for dysuria.   Musculoskeletal: Negative for joint swelling.   Skin: Negative for color change, rash and wound.   Allergic/Immunologic: Negative for immunocompromised state.   Neurological: Negative for dizziness, seizures, weakness and headaches.   Hematological: Negative for adenopathy. Does not bruise/bleed easily.   Psychiatric/Behavioral: Negative for agitation and confusion.        Personal History     Past Medical History:   Diagnosis Date   • Arthritis    • Asthma    • CHF (congestive heart failure)    • COPD (chronic obstructive pulmonary disease)    • Coronary artery disease    • Hypertension    • Sleep apnea        Past Surgical History:   Procedure Laterality Date   • CARDIAC CATHETERIZATION  2008   • HERNIA REPAIR         Family History: family history includes Heart  disease in his sister; Hypertension in his father, mother, and sister. Otherwise pertinent FHx was reviewed and not pertinent to current issue.    Social History:  reports that he quit smoking about 8 years ago. His smoking use included cigarettes. He smoked an average of .25 packs per day. He has never been exposed to tobacco smoke. He has never used smokeless tobacco. He reports current drug use. Frequency: 7.00 times per week. Drug: Marijuana. He reports that he does not drink alcohol.    Home Medications:   ALPRAZolam, albuterol sulfate HFA, amLODIPine, apixaban, atorvastatin, buPROPion XL, budesonide-formoterol, bumetanide, carvedilol, cloNIDine, ezetimibe, isosorbide mononitrate, linaclotide, metOLazone, montelukast, ondansetron, pantoprazole, rOPINIRole, and tamsulosin    Allergies:  No Known Allergies    Objective    Objective     Vitals:  Temp:  [97.2 °F (36.2 °C)-98 °F (36.7 °C)] 97.7 °F (36.5 °C)  Heart Rate:  [79-94] 80  Resp:  [16-18] 18  BP: (101-154)/(60-93) 126/70    Physical Exam  Constitutional:       Appearance: He is well-developed.   HENT:      Head: Normocephalic and atraumatic.   Eyes:      Conjunctiva/sclera: Conjunctivae normal.      Pupils: Pupils are equal, round, and reactive to light.   Neck:      Thyroid: No thyromegaly.      Vascular: No JVD.      Trachea: No tracheal deviation.   Cardiovascular:      Rate and Rhythm: Normal rate and regular rhythm.      Heart sounds: No murmur heard.    No friction rub. No gallop.   Pulmonary:      Effort: Pulmonary effort is normal.      Breath sounds: Normal breath sounds.   Abdominal:      General: There is no distension.      Palpations: Abdomen is soft. There is no hepatomegaly or splenomegaly.      Tenderness: There is no abdominal tenderness.      Hernia: No hernia is present.   Musculoskeletal:         General: No deformity. Normal range of motion.      Cervical back: Neck supple.   Skin:     General: Skin is warm and dry.   Neurological:       Mental Status: He is alert and oriented to person, place, and time.         Result Review    Result Review:  I have personally reviewed the results from the time of this admission to 5/21/2023 13:20 EDT and agree with these findings:  [x]  Laboratory list / accordion  [x]  Microbiology  [x]  Radiology  []  EKG/Telemetry   [x]  Cardiology/Vascular   []  Pathology  []  Old records  []  Other:        Assessment & Plan   Assessment / Plan     Brief Patient Summary:  Sudarshan Keene is a 48 y.o. male who presented with bacteremia secondary to infected tunneled hemodialysis catheter.  This catheter was removed and he is now ready for replacement catheter for long-term hemodialysis access.    Active Hospital Problems:  Active Hospital Problems    Diagnosis    • **Sepsis, due to unspecified organism, unspecified whether acute organ dysfunction present      Plan:   OR tomorrow for tunneled hemodialysis catheter placement  N.p.o. after midnight  Hold Sadi Garcia MD

## 2023-05-21 NOTE — PROGRESS NOTES
PROGRESS NOTE         Patient Identification:  Name:  Sudarshan Keene  Age:  48 y.o.  Sex:  male  :  1975  MRN:  [id number]  Visit Number:  [id number]  Primary Care Provider:  Marah Bain APRN         LOS: 4 days       ----------------------------------------------------------------------------------------------------------------------  Subjective       Chief Complaints:    Vascular Access Problem        Interval History:      Patient is resting comfortably in bed on room air no apparent distress.  Tearful this morning due to situation at home, but otherwise has no new issues or complaints.  Reports that he takes regular anxiety and depression medications while hospitalized.  Lungs are clear to auscultation bilaterally.  Abdomen is soft and nontender.  Afebrile, no diarrhea.  No new labs today.  Blood cultures on 2023 are so far showing no growth.    Review of Systems:    Constitutional: no fever, chills and night sweats.  Generalized fatigue.  Eyes: no eye drainage, itching or redness.  HEENT: no mouth sores, dysphagia or nose bleed.  Respiratory: no for shortness of breath, cough or production of sputum.  Cardiovascular: no chest pain, no palpitations, no orthopnea.  Gastrointestinal: no nausea, vomiting, or diarrhea. No abdominal pain, hematemesis or rectal bleeding.   Genitourinary: no dysuria or polyuria.  Hematologic/lymphatic: no lymph node abnormalities, no easy bruising or easy bleeding.  Musculoskeletal: no muscle or joint pain.  Skin: No rash and no itching.  Neurological: no loss of consciousness, no seizure, no headache.  Behavioral/Psych: no suicidal ideation.  Depression.  Endocrine: no hot flashes.  Immunologic: negative.     ----------------------------------------------------------------------------------------------------------------------      Objective       Current [facility] Meds:  ALPRAZolam, 2 mg, Oral, Nightly  amLODIPine, 10 mg, Oral, Daily  apixaban, 5  mg, Oral, BID  atorvastatin, 40 mg, Oral, Nightly  budesonide-formoterol, 2 puff, Inhalation, BID - RT  bumetanide, 2 mg, Oral, Daily  buPROPion XL, 300 mg, Oral, QAM  carvedilol, 25 mg, Oral, BID With Meals  cloNIDine, 0.3 mg, Oral, TID  isosorbide mononitrate, 120 mg, Oral, Daily  linaclotide, 145 mcg, Oral, QAM AC  metOLazone, 10 mg, Oral, Daily  montelukast, 10 mg, Oral, Nightly  pantoprazole, 40 mg, Oral, Daily  rOPINIRole, 0.25 mg, Oral, TID  sodium chloride, 10 mL, Intravenous, Q12H  tamsulosin, 0.4 mg, Oral, Daily  Vancomycin Pharmacy Intermittent/Pulse Dosing, , Does not apply, Daily         ----------------------------------------------------------------------------------------------------------------------    Vital Signs:  Temp:  [97.2 °F (36.2 °C)-98.6 °F (37 °C)] 97.8 °F (36.6 °C)  Heart Rate:  [79-94] 79  Resp:  [16-18] 18  BP: (101-154)/(60-93) 150/85  Mean Arterial Pressure (Non-Invasive) for the past 24 hrs (Last 3 readings):   Noninvasive MAP (mmHg)   05/21/23 0721 106   05/21/23 0239 103   05/21/23 0019 113     SpO2 Percentage    05/21/23 0239 05/21/23 0655 05/21/23 0721   SpO2: 94% 99% 93%     SpO2:  [91 %-99 %] 93 %  on   ;   Device (Oxygen Therapy): room air    Body mass index is 37.7 kg/m².  Wt Readings from Last 3 Encounters:   05/20/23 116 kg (255 lb 6.4 oz)   12/21/21 (!) 148 kg (326 lb)   08/04/20 (!) 166 kg (366 lb 11.2 oz)        Intake/Output Summary (Last 24 hours) at 5/21/2023 1015  Last data filed at 5/20/2023 6587  Gross per 24 hour   Intake 1080 ml   Output --   Net 1080 ml     Diet: Regular/House Diet, Renal Diets; Low Sodium (2-3g), Low Potassium, Low Phosphorus; Texture: Regular Texture (IDDSI 7); Fluid Consistency: Thin (IDDSI 0)  ----------------------------------------------------------------------------------------------------------------------      Physical Exam:    Constitutional: Well-developed and well-nourished black male is resting comfortably in bed on room air in no  apparent distress.   HENT:  Head: Normocephalic and atraumatic.  Mouth:  Moist mucous membranes.    Eyes:  Conjunctivae and EOM are normal.  No scleral icterus.  Neck:  Neck supple.  No JVD present.    Cardiovascular:  Normal rate, regular rhythm and normal heart sounds with no murmur. No edema.  Pulmonary/Chest:  No respiratory distress, no wheezes, no crackles, with normal breath sounds and good air movement.   Abdominal:  Soft.  Bowel sounds are normal.  No distension and no tenderness.   Musculoskeletal:  No edema, no tenderness, and no deformity.  No swelling or redness of joints.  Neurological:  Alert and oriented to person, place, and time.  No facial droop.  No slurred speech.   Skin:  Skin is warm and dry.  No rash noted.  No pallor.  Left AV fistula, not mature at this time.  Psychiatric:   Depressed mood and affect.  Behavior is normal.    ----------------------------------------------------------------------------------------------------------------------            Results from last 7 days   Lab Units 05/20/23  0247 05/19/23  0126 05/18/23  0112 05/17/23  0316 05/17/23  0053   CRP mg/dL  --   --   --   --  11.81*   LACTATE mmol/L  --   --   --   --  0.7   WBC 10*3/mm3 7.52 8.86 10.16   < > 14.09*   HEMOGLOBIN g/dL 11.1* 11.9* 11.8*   < > 12.2*   HEMATOCRIT % 34.4* 36.3* 35.9*   < > 38.6   MCV fL 96.9 98.4* 96.0   < > 99.7*   MCHC g/dL 32.3 32.8 32.9   < > 31.6   PLATELETS 10*3/mm3 120* 105* 128*   < > 150   INR   --   --   --   --  1.17*    < > = values in this interval not displayed.     Results from last 7 days   Lab Units 05/21/23  0540 05/20/23  0247 05/19/23  0126 05/18/23  1721 05/18/23  0112 05/17/23  0316 05/17/23  0053   SODIUM mmol/L 134* 135* 133* 128*   < > 133* 135*   POTASSIUM mmol/L 5.2 4.7 4.7 4.1   < > 5.2 5.1   MAGNESIUM mg/dL  --   --   --  2.0  --   --   --    CHLORIDE mmol/L 90* 90* 91* 85*   < > 98 99   CO2 mmol/L 23.7 25.6 29.1* 29.3*   < > 19.5* 21.5*   BUN mg/dL 86* 65* 43* 32*    < > 59* 58*   CREATININE mg/dL 13.56* 11.44* 8.19* 7.30*   < > 10.19* 10.65*   CALCIUM mg/dL 9.1 8.8 9.2 9.2   < > 9.2 9.1   GLUCOSE mg/dL 109* 102* 101* 116*   < > 70 83   ALBUMIN g/dL 3.4*  --   --   --   --  3.7 3.6   BILIRUBIN mg/dL 0.3  --   --   --   --  0.4 0.4   ALK PHOS U/L 51  --   --   --   --  55 52   AST (SGOT) U/L 10  --   --   --   --  13 14   ALT (SGPT) U/L 8  --   --   --   --  7 6    < > = values in this interval not displayed.   Estimated Creatinine Clearance: 8.4 mL/min (A) (by C-G formula based on SCr of 13.56 mg/dL (H)).  No results found for: AMMONIA    Glucose   Date/Time Value Ref Range Status   05/18/2023 1547 116 70 - 130 mg/dL Final     Comment:     Meter: [id number] : [id number] ARNOLDO TRAVIS     Lab Results   Component Value Date    HGBA1C 5.60 08/01/2020     Lab Results   Component Value Date    TSH 1.670 08/01/2020    FREET4 1.05 12/21/2015       Blood Culture   Date Value Ref Range Status   05/17/2023 Abnormal Stain (C)  Preliminary   05/17/2023 Abnormal Stain (C)  Preliminary   05/17/2023 Abnormal Stain (C)  Preliminary     No results found for: URINECX  No results found for: WOUNDCX  No results found for: STOOLCX  No results found for: RESPCX  Pain Management Panel           Latest Ref Rng & Units 12/21/2021 12/19/2021   Pain Management Panel   Creatinine, Urine mg/dL 48.3   64.1                    ----------------------------------------------------------------------------------------------------------------------  Imaging Results (Last 24 Hours)       ** No results found for the last 24 hours. **            ----------------------------------------------------------------------------------------------------------------------    Pertinent Infectious Disease Results    COVID-19 and influenza PCR negative.  Chest x-ray from 5/17/2023 reports mildly enlarged heart size, no lobar consolidation or edema, no pleural effusion or pneumothorax, left neck central vascular catheter  with tip in the SVC/RA junction, left anterior chest port with catheter tip into the SVC. 3 out of 3 blood cultures on 5/17/2023 finalized as MRSA.   NELIDA on 5/19/2023 made no mention of vegetations or abscesses.     Assessment/Plan       Assessment     MRSA bacteremia  Tunneled dialysis catheter infection status post removal on 5/18/2023    Plan      Examined the patient this morning and he is resting comfortably in bed on room air no apparent distress.  Tearful this morning due to situation at home, but otherwise has no new issues or complaints.  Reports that he takes regular anxiety and depression medications while hospitalized.  Lungs are clear to auscultation bilaterally.  Abdomen is soft and nontender.  Afebrile, no diarrhea.  No new labs today.  Blood cultures on 5/19/2023 are so far showing no growth.    Status post dialysis catheter removal on 5/18/2023 with Dr. Luz.     As tunneled dialysis catheter has now been removed, recommend a 72-hour.  Frequent dialysis before inserting another catheter.  This break will allow for additional maturation of AV fistula.  Recommend to continue on vancomycin per pharmacy dosing through 6/1/2023 for 2-week course of IV antibiotic therapy from clearing date. IV antibiotic therapy administered with dialysis would be an option. Repeat blood cultures are so far showing no growth.    ANTIMICROBIAL THERAPY    Vancomycin Pharmacy Intermittent/Pulse Dosing     Code Status:   Code Status and Medical Interventions:   Ordered at: 05/17/23 0152     Code Status (Patient has no pulse and is not breathing):    CPR (Attempt to Resuscitate)     Medical Interventions (Patient has pulse or is breathing):    Full Support       Shayna Brand PA-C  05/21/23  10:15 EDT

## 2023-05-21 NOTE — PROGRESS NOTES
"    [facility]    S: Patient seen and examined.  No CP or SOB.  No acute changes    O: /85 (BP Location: Right arm, Patient Position: Lying)   Pulse 79   Temp 97.8 °F (36.6 °C) (Oral)   Resp 18   Ht 175.3 cm (69.02\")   Wt 116 kg (255 lb 6.4 oz)   SpO2 93%   BMI 37.70 kg/m²     GENERAL:  age appropriate, NAD  EARS,NOSE, MOUTH, THROAT:  MMM, hearing intact  EYES: PERRL, EOMI  CV:  NL S1/S2  RESPIRATORY:  CTAB no w/c/r  GI:  S/NT/ND +BS  SKIN: No rash or lesions. No nodules.  INT: No edema. No joint deformity.  PSYCH:  Normal affect, A+O x 3  NEURO: Alert and oriented, grossly nonfocal.      Scheduled Meds:ALPRAZolam, 2 mg, Oral, Nightly  amLODIPine, 10 mg, Oral, Daily  apixaban, 5 mg, Oral, BID  atorvastatin, 40 mg, Oral, Nightly  budesonide-formoterol, 2 puff, Inhalation, BID - RT  bumetanide, 2 mg, Oral, Daily  buPROPion XL, 300 mg, Oral, QAM  carvedilol, 25 mg, Oral, BID With Meals  cloNIDine, 0.3 mg, Oral, TID  isosorbide mononitrate, 120 mg, Oral, Daily  linaclotide, 145 mcg, Oral, QAM AC  metOLazone, 10 mg, Oral, Daily  montelukast, 10 mg, Oral, Nightly  pantoprazole, 40 mg, Oral, Daily  rOPINIRole, 0.25 mg, Oral, TID  sodium chloride, 10 mL, Intravenous, Q12H  tamsulosin, 0.4 mg, Oral, Daily  Vancomycin Pharmacy Intermittent/Pulse Dosing, , Does not apply, Daily      Continuous Infusions:   PRN Meds:.•  acetaminophen  •  nitroglycerin  •  ondansetron  •  sodium chloride  •  sodium chloride  •  traMADol    Labs: Laboratory information reviewed and reconciled.  Results from last 7 days   Lab Units 05/20/23  0247   WBC 10*3/mm3 7.52       Results from last 7 days   Lab Units 05/21/23  0540   CO2 mmol/L 23.7   BUN mg/dL 86*   CREATININE mg/dL 13.56*   GLUCOSE mg/dL 109*       Results from last 7 days   Lab Units 05/17/23  0053   INR  1.17*         Imaging (last 24 hours):    No radiology results for the last day   Assessment/Plan:  # Sepsis 2/2 cellulitis/MRSA " bacteremia  - ID following, appreciate recs  - continue to follow blood cultures  - supportive care  - serial labs  - port removed 05/18  - NELIDA done 05/19, no sign of infection  - continue vanc  - repeat cultures 05/19 NGTD    # ESRD  - nephro following, appreciate recs  - place temp dialysis cath after 72 hour vacation with abx     # HTN  - continue home regimen  - monitor and titrate as needed     # COPD without exacerbation  - continue home regimen     # Chronic CHFpEf  - monitor volume status  - continue home regimen     # CAD  - conitnue home regimen     # PAF  - resume eliquis         DVT Prophylaxis:eliquis  Disposition: continue to monitor  Discharge disposition: TBD  Prognosis: guarded    Geovanny Mosquera Jr, MD  [facility]  05/21/23  09:40 EDT

## 2023-05-21 NOTE — NURSING NOTE
Pt received at end of shift. Pt resting in chair at bedside with no s/s of distress noted. VSS. Port maintained, dressing is C/D/I. Pt will be NPO after midnight for procedure in AM. Pt ambulated on unit throughout shift, tolerated well. Will continue with plan of care.

## 2023-05-21 NOTE — PLAN OF CARE
Goal Outcome Evaluation:     Patient resting in bed. Patient ambulate from room 304A to 325 to transfer rooms. No complaints; no indicators of acute distress noted. Will continue to follow plan of care this shift.

## 2023-05-21 NOTE — PLAN OF CARE
Goal Outcome Evaluation:  Patient is resting in bed watching television. Patient is A&Ox4. Patient is currently on room air. Patient will be strict NPO at midnight for hemodialysis catheter placement tomorrow. Patient has tolerated all interventions. No complaints or concerns at this time. No signs of acute distress noted. Will continue to follow plan of care.

## 2023-05-22 ENCOUNTER — ANESTHESIA (OUTPATIENT)
Dept: PERIOP | Facility: HOSPITAL | Age: 48
End: 2023-05-22
Payer: MEDICARE

## 2023-05-22 ENCOUNTER — ANESTHESIA EVENT (OUTPATIENT)
Dept: PERIOP | Facility: HOSPITAL | Age: 48
End: 2023-05-22
Payer: MEDICARE

## 2023-05-22 ENCOUNTER — APPOINTMENT (OUTPATIENT)
Dept: GENERAL RADIOLOGY | Facility: HOSPITAL | Age: 48
End: 2023-05-22
Payer: MEDICARE

## 2023-05-22 VITALS
WEIGHT: 254 LBS | TEMPERATURE: 98 F | HEIGHT: 69 IN | RESPIRATION RATE: 18 BRPM | BODY MASS INDEX: 37.62 KG/M2 | OXYGEN SATURATION: 96 % | SYSTOLIC BLOOD PRESSURE: 125 MMHG | DIASTOLIC BLOOD PRESSURE: 85 MMHG | HEART RATE: 82 BPM

## 2023-05-22 PROBLEM — T82.7XXA HEMODIALYSIS CATHETER INFECTION: Status: ACTIVE | Noted: 2023-05-16

## 2023-05-22 LAB
ALBUMIN SERPL-MCNC: 3.4 G/DL (ref 3.5–5.2)
ALBUMIN/GLOB SERPL: 1.1 G/DL
ALP SERPL-CCNC: 53 U/L (ref 39–117)
ALT SERPL W P-5'-P-CCNC: 11 U/L (ref 1–41)
ANION GAP SERPL CALCULATED.3IONS-SCNC: 22.5 MMOL/L (ref 5–15)
AST SERPL-CCNC: 12 U/L (ref 1–40)
BASOPHILS # BLD AUTO: 0.04 10*3/MM3 (ref 0–0.2)
BASOPHILS NFR BLD AUTO: 0.6 % (ref 0–1.5)
BILIRUB SERPL-MCNC: 0.3 MG/DL (ref 0–1.2)
BUN SERPL-MCNC: 96 MG/DL (ref 6–20)
BUN/CREAT SERPL: 6.3 (ref 7–25)
CALCIUM SPEC-SCNC: 9.2 MG/DL (ref 8.6–10.5)
CHLORIDE SERPL-SCNC: 91 MMOL/L (ref 98–107)
CO2 SERPL-SCNC: 20.5 MMOL/L (ref 22–29)
CREAT SERPL-MCNC: 15.14 MG/DL (ref 0.76–1.27)
DEPRECATED RDW RBC AUTO: 54.1 FL (ref 37–54)
EGFRCR SERPLBLD CKD-EPI 2021: 3.6 ML/MIN/1.73
EOSINOPHIL # BLD AUTO: 0.3 10*3/MM3 (ref 0–0.4)
EOSINOPHIL NFR BLD AUTO: 4.4 % (ref 0.3–6.2)
ERYTHROCYTE [DISTWIDTH] IN BLOOD BY AUTOMATED COUNT: 15.2 % (ref 12.3–15.4)
GLOBULIN UR ELPH-MCNC: 3.1 GM/DL
GLUCOSE SERPL-MCNC: 82 MG/DL (ref 65–99)
HCT VFR BLD AUTO: 35 % (ref 37.5–51)
HGB BLD-MCNC: 11.5 G/DL (ref 13–17.7)
IMM GRANULOCYTES # BLD AUTO: 0.04 10*3/MM3 (ref 0–0.05)
IMM GRANULOCYTES NFR BLD AUTO: 0.6 % (ref 0–0.5)
LYMPHOCYTES # BLD AUTO: 2.02 10*3/MM3 (ref 0.7–3.1)
LYMPHOCYTES NFR BLD AUTO: 29.4 % (ref 19.6–45.3)
MCH RBC QN AUTO: 31.8 PG (ref 26.6–33)
MCHC RBC AUTO-ENTMCNC: 32.9 G/DL (ref 31.5–35.7)
MCV RBC AUTO: 96.7 FL (ref 79–97)
MONOCYTES # BLD AUTO: 0.71 10*3/MM3 (ref 0.1–0.9)
MONOCYTES NFR BLD AUTO: 10.3 % (ref 5–12)
NEUTROPHILS NFR BLD AUTO: 3.75 10*3/MM3 (ref 1.7–7)
NEUTROPHILS NFR BLD AUTO: 54.7 % (ref 42.7–76)
NRBC BLD AUTO-RTO: 0 /100 WBC (ref 0–0.2)
PLATELET # BLD AUTO: 139 10*3/MM3 (ref 140–450)
PMV BLD AUTO: 10.1 FL (ref 6–12)
POTASSIUM SERPL-SCNC: 5.5 MMOL/L (ref 3.5–5.2)
PROT SERPL-MCNC: 6.5 G/DL (ref 6–8.5)
RBC # BLD AUTO: 3.62 10*6/MM3 (ref 4.14–5.8)
SODIUM SERPL-SCNC: 134 MMOL/L (ref 136–145)
WBC NRBC COR # BLD: 6.86 10*3/MM3 (ref 3.4–10.8)

## 2023-05-22 PROCEDURE — 94799 UNLISTED PULMONARY SVC/PX: CPT

## 2023-05-22 PROCEDURE — 76937 US GUIDE VASCULAR ACCESS: CPT | Performed by: SURGERY

## 2023-05-22 PROCEDURE — 80053 COMPREHEN METABOLIC PANEL: CPT | Performed by: INTERNAL MEDICINE

## 2023-05-22 PROCEDURE — 76000 FLUOROSCOPY <1 HR PHYS/QHP: CPT | Performed by: RADIOLOGY

## 2023-05-22 PROCEDURE — 25010000002 HEPARIN LOCK FLUSH PER 10 UNITS: Performed by: SURGERY

## 2023-05-22 PROCEDURE — 77001 FLUOROGUIDE FOR VEIN DEVICE: CPT | Performed by: SURGERY

## 2023-05-22 PROCEDURE — 71045 X-RAY EXAM CHEST 1 VIEW: CPT

## 2023-05-22 PROCEDURE — 71045 X-RAY EXAM CHEST 1 VIEW: CPT | Performed by: RADIOLOGY

## 2023-05-22 PROCEDURE — C1750 CATH, HEMODIALYSIS,LONG-TERM: HCPCS | Performed by: SURGERY

## 2023-05-22 PROCEDURE — 36558 INSERT TUNNELED CV CATH: CPT | Performed by: SURGERY

## 2023-05-22 PROCEDURE — 99239 HOSP IP/OBS DSCHRG MGMT >30: CPT | Performed by: INTERNAL MEDICINE

## 2023-05-22 PROCEDURE — 99024 POSTOP FOLLOW-UP VISIT: CPT | Performed by: SURGERY

## 2023-05-22 PROCEDURE — 99232 SBSQ HOSP IP/OBS MODERATE 35: CPT | Performed by: INTERNAL MEDICINE

## 2023-05-22 PROCEDURE — 97161 PT EVAL LOW COMPLEX 20 MIN: CPT

## 2023-05-22 PROCEDURE — 76000 FLUOROSCOPY <1 HR PHYS/QHP: CPT

## 2023-05-22 PROCEDURE — 85025 COMPLETE CBC W/AUTO DIFF WBC: CPT | Performed by: INTERNAL MEDICINE

## 2023-05-22 PROCEDURE — 25010000002 FENTANYL CITRATE (PF) 50 MCG/ML SOLUTION: Performed by: NURSE ANESTHETIST, CERTIFIED REGISTERED

## 2023-05-22 PROCEDURE — 25010000002 CEFAZOLIN PER 500 MG: Performed by: NURSE ANESTHETIST, CERTIFIED REGISTERED

## 2023-05-22 PROCEDURE — 0JPT3XZ REMOVAL OF TUNNELED VASCULAR ACCESS DEVICE FROM TRUNK SUBCUTANEOUS TISSUE AND FASCIA, PERCUTANEOUS APPROACH: ICD-10-PCS | Performed by: SURGERY

## 2023-05-22 PROCEDURE — 02PYX3Z REMOVAL OF INFUSION DEVICE FROM GREAT VESSEL, EXTERNAL APPROACH: ICD-10-PCS | Performed by: SURGERY

## 2023-05-22 PROCEDURE — C1894 INTRO/SHEATH, NON-LASER: HCPCS | Performed by: SURGERY

## 2023-05-22 PROCEDURE — 25010000002 PROPOFOL 200 MG/20ML EMULSION: Performed by: NURSE ANESTHETIST, CERTIFIED REGISTERED

## 2023-05-22 RX ORDER — ONDANSETRON 2 MG/ML
4 INJECTION INTRAMUSCULAR; INTRAVENOUS AS NEEDED
Status: DISCONTINUED | OUTPATIENT
Start: 2023-05-22 | End: 2023-05-22 | Stop reason: HOSPADM

## 2023-05-22 RX ORDER — TRAMADOL HYDROCHLORIDE 50 MG/1
50 TABLET ORAL EVERY 12 HOURS PRN
Start: 2023-05-22 | End: 2023-05-24

## 2023-05-22 RX ORDER — SODIUM CHLORIDE 9 MG/ML
INJECTION, SOLUTION INTRAVENOUS CONTINUOUS PRN
Status: DISCONTINUED | OUTPATIENT
Start: 2023-05-22 | End: 2023-05-22 | Stop reason: SURG

## 2023-05-22 RX ORDER — PROPOFOL 10 MG/ML
INJECTION, EMULSION INTRAVENOUS AS NEEDED
Status: DISCONTINUED | OUTPATIENT
Start: 2023-05-22 | End: 2023-05-22 | Stop reason: SURG

## 2023-05-22 RX ORDER — CEFAZOLIN SODIUM 1 G/3ML
INJECTION, POWDER, FOR SOLUTION INTRAMUSCULAR; INTRAVENOUS AS NEEDED
Status: DISCONTINUED | OUTPATIENT
Start: 2023-05-22 | End: 2023-05-22 | Stop reason: SURG

## 2023-05-22 RX ORDER — OXYCODONE HYDROCHLORIDE AND ACETAMINOPHEN 5; 325 MG/1; MG/1
1 TABLET ORAL ONCE AS NEEDED
Status: DISCONTINUED | OUTPATIENT
Start: 2023-05-22 | End: 2023-05-22 | Stop reason: HOSPADM

## 2023-05-22 RX ORDER — BUPIVACAINE HYDROCHLORIDE 2.5 MG/ML
INJECTION, SOLUTION EPIDURAL; INFILTRATION; INTRACAUDAL AS NEEDED
Status: DISCONTINUED | OUTPATIENT
Start: 2023-05-22 | End: 2023-05-22 | Stop reason: HOSPADM

## 2023-05-22 RX ORDER — IPRATROPIUM BROMIDE AND ALBUTEROL SULFATE 2.5; .5 MG/3ML; MG/3ML
3 SOLUTION RESPIRATORY (INHALATION) ONCE AS NEEDED
Status: DISCONTINUED | OUTPATIENT
Start: 2023-05-22 | End: 2023-05-22 | Stop reason: HOSPADM

## 2023-05-22 RX ORDER — FENTANYL CITRATE 50 UG/ML
INJECTION, SOLUTION INTRAMUSCULAR; INTRAVENOUS AS NEEDED
Status: DISCONTINUED | OUTPATIENT
Start: 2023-05-22 | End: 2023-05-22 | Stop reason: SURG

## 2023-05-22 RX ORDER — SODIUM CHLORIDE 9 MG/ML
40 INJECTION, SOLUTION INTRAVENOUS AS NEEDED
Status: DISCONTINUED | OUTPATIENT
Start: 2023-05-22 | End: 2023-05-22 | Stop reason: HOSPADM

## 2023-05-22 RX ORDER — SODIUM CHLORIDE, SODIUM LACTATE, POTASSIUM CHLORIDE, CALCIUM CHLORIDE 600; 310; 30; 20 MG/100ML; MG/100ML; MG/100ML; MG/100ML
100 INJECTION, SOLUTION INTRAVENOUS ONCE AS NEEDED
Status: DISCONTINUED | OUTPATIENT
Start: 2023-05-22 | End: 2023-05-22 | Stop reason: HOSPADM

## 2023-05-22 RX ORDER — ACETAMINOPHEN 325 MG/1
650 TABLET ORAL EVERY 6 HOURS PRN
Start: 2023-05-22

## 2023-05-22 RX ORDER — MEPERIDINE HYDROCHLORIDE 25 MG/ML
12.5 INJECTION INTRAMUSCULAR; INTRAVENOUS; SUBCUTANEOUS
Status: DISCONTINUED | OUTPATIENT
Start: 2023-05-22 | End: 2023-05-22 | Stop reason: HOSPADM

## 2023-05-22 RX ORDER — HEPARIN SODIUM (PORCINE) LOCK FLUSH IV SOLN 100 UNIT/ML 100 UNIT/ML
SOLUTION INTRAVENOUS AS NEEDED
Status: DISCONTINUED | OUTPATIENT
Start: 2023-05-22 | End: 2023-05-22 | Stop reason: HOSPADM

## 2023-05-22 RX ORDER — SODIUM CHLORIDE 0.9 % (FLUSH) 0.9 %
10 SYRINGE (ML) INJECTION AS NEEDED
Status: DISCONTINUED | OUTPATIENT
Start: 2023-05-22 | End: 2023-05-22 | Stop reason: HOSPADM

## 2023-05-22 RX ORDER — FENTANYL CITRATE 50 UG/ML
50 INJECTION, SOLUTION INTRAMUSCULAR; INTRAVENOUS
Status: DISCONTINUED | OUTPATIENT
Start: 2023-05-22 | End: 2023-05-22 | Stop reason: HOSPADM

## 2023-05-22 RX ORDER — SODIUM CHLORIDE 0.9 % (FLUSH) 0.9 %
10 SYRINGE (ML) INJECTION EVERY 12 HOURS SCHEDULED
Status: DISCONTINUED | OUTPATIENT
Start: 2023-05-22 | End: 2023-05-22 | Stop reason: HOSPADM

## 2023-05-22 RX ADMIN — CEFAZOLIN 2 G: 1 INJECTION, POWDER, FOR SOLUTION INTRAMUSCULAR; INTRAVENOUS; PARENTERAL at 11:51

## 2023-05-22 RX ADMIN — TAMSULOSIN HYDROCHLORIDE 0.4 MG: 0.4 CAPSULE ORAL at 08:54

## 2023-05-22 RX ADMIN — PROPOFOL 150 MG: 10 INJECTION, EMULSION INTRAVENOUS at 11:48

## 2023-05-22 RX ADMIN — BUMETANIDE 2 MG: 1 TABLET ORAL at 08:53

## 2023-05-22 RX ADMIN — AMLODIPINE BESYLATE 10 MG: 10 TABLET ORAL at 08:54

## 2023-05-22 RX ADMIN — FENTANYL CITRATE 100 MCG: 50 INJECTION INTRAMUSCULAR; INTRAVENOUS at 11:48

## 2023-05-22 RX ADMIN — Medication 10 ML: at 08:54

## 2023-05-22 RX ADMIN — CLONIDINE HYDROCHLORIDE 0.3 MG: 0.3 TABLET ORAL at 17:22

## 2023-05-22 RX ADMIN — CARVEDILOL 25 MG: 25 TABLET, FILM COATED ORAL at 08:53

## 2023-05-22 RX ADMIN — ROPINIROLE HYDROCHLORIDE 0.25 MG: 0.25 TABLET, FILM COATED ORAL at 17:22

## 2023-05-22 RX ADMIN — BUDESONIDE AND FORMOTEROL FUMARATE DIHYDRATE 2 PUFF: 160; 4.5 AEROSOL RESPIRATORY (INHALATION) at 06:31

## 2023-05-22 RX ADMIN — ISOSORBIDE MONONITRATE 120 MG: 30 TABLET, EXTENDED RELEASE ORAL at 08:53

## 2023-05-22 RX ADMIN — CARVEDILOL 25 MG: 25 TABLET, FILM COATED ORAL at 17:21

## 2023-05-22 RX ADMIN — CLONIDINE HYDROCHLORIDE 0.3 MG: 0.3 TABLET ORAL at 08:53

## 2023-05-22 RX ADMIN — METOLAZONE 10 MG: 2.5 TABLET ORAL at 08:54

## 2023-05-22 RX ADMIN — PANTOPRAZOLE SODIUM 40 MG: 40 TABLET, DELAYED RELEASE ORAL at 08:54

## 2023-05-22 RX ADMIN — ROPINIROLE HYDROCHLORIDE 0.25 MG: 0.25 TABLET, FILM COATED ORAL at 08:54

## 2023-05-22 RX ADMIN — SODIUM CHLORIDE: 9 INJECTION, SOLUTION INTRAVENOUS at 11:41

## 2023-05-22 NOTE — PROGRESS NOTES
Nephrology Progress Note      Subjective   Denied any chest pain or shortness of breath  Objective       Vital signs :     Temp:  [97.7 °F (36.5 °C)-98 °F (36.7 °C)] 97.8 °F (36.6 °C)  Heart Rate:  [80-96] 91  Resp:  [16-18] 16  BP: (104-190)/() 180/104    Intake/Output                       05/20/23 0701 - 05/21/23 0700 05/21/23 0701 - 05/22/23 0700     [id number] 3244-6554 Total 0940-8638 8841-3198 Total                 Intake    P.O.  1080  -- 1080  720  -- 720    Total Intake 1080 -- 1080 720 -- 720       Output    Total Output -- -- -- -- -- --           Physical Exam:    General Appearance : not in acute distress  Lungs : clear to auscultation, respirations regular  Heart :  regular rhythm & normal rate, normal S1, S2 and no murmur, no rub  Abdomen : soft, non distended  Extremities : no edema  Neurologic :   orientated to person, place, time and situation, Grossly no focal deficits    Laboratory Data :     Albumin Albumin   Date Value Ref Range Status   05/22/2023 3.4 (L) 3.5 - 5.2 g/dL Final   05/21/2023 3.4 (L) 3.5 - 5.2 g/dL Final      Magnesium No results found for: MG       PTH               No results found for: PTH    CBC and coagulation:  Results from last 7 days   Lab Units 05/22/23  0443 05/20/23  0247 05/19/23  0126 05/17/23  0316 05/17/23  0053   LACTATE mmol/L  --   --   --   --  0.7   SED RATE mm/hr  --   --   --   --  26*   CRP mg/dL  --   --   --   --  11.81*   WBC 10*3/mm3 6.86 7.52 8.86   < > 14.09*   HEMOGLOBIN g/dL 11.5* 11.1* 11.9*   < > 12.2*   HEMATOCRIT % 35.0* 34.4* 36.3*   < > 38.6   MCV fL 96.7 96.9 98.4*   < > 99.7*   MCHC g/dL 32.9 32.3 32.8   < > 31.6   PLATELETS 10*3/mm3 139* 120* 105*   < > 150   INR   --   --   --   --  1.17*    < > = values in this interval not displayed.     Acid/base balance:      Renal and electrolytes:    Results from last 7 days   Lab Units 05/22/23  0443 05/21/23  0540 05/20/23  0247 05/19/23  0126 05/18/23  1721   SODIUM mmol/L 134* 134* 135* 133*  128*   POTASSIUM mmol/L 5.5* 5.2 4.7 4.7 4.1   MAGNESIUM mg/dL  --   --   --   --  2.0   CHLORIDE mmol/L 91* 90* 90* 91* 85*   CO2 mmol/L 20.5* 23.7 25.6 29.1* 29.3*   BUN mg/dL 96* 86* 65* 43* 32*   CREATININE mg/dL 15.14* 13.56* 11.44* 8.19* 7.30*   CALCIUM mg/dL 9.2 9.1 8.8 9.2 9.2     Estimated Creatinine Clearance: 7.5 mL/min (A) (by C-G formula based on SCr of 15.14 mg/dL (H)).  @GFRCG:3@   Liver and pancreatic function:  Results from last 7 days   Lab Units 05/22/23  0443 05/21/23  0540 05/17/23  0316   ALBUMIN g/dL 3.4* 3.4* 3.7   BILIRUBIN mg/dL 0.3 0.3 0.4   ALK PHOS U/L 53 51 55   AST (SGOT) U/L 12 10 13   ALT (SGPT) U/L 11 8 7         Cardiac:      Liver and pancreatic function:  Results from last 7 days   Lab Units 05/22/23  0443 05/21/23  0540 05/17/23  0316   ALBUMIN g/dL 3.4* 3.4* 3.7   BILIRUBIN mg/dL 0.3 0.3 0.4   ALK PHOS U/L 53 51 55   AST (SGOT) U/L 12 10 13   ALT (SGPT) U/L 11 8 7       Medications :     ALPRAZolam, 2 mg, Oral, Nightly  amLODIPine, 10 mg, Oral, Daily  apixaban, 5 mg, Oral, BID  atorvastatin, 40 mg, Oral, Nightly  budesonide-formoterol, 2 puff, Inhalation, BID - RT  bumetanide, 2 mg, Oral, Daily  buPROPion XL, 300 mg, Oral, QAM  carvedilol, 25 mg, Oral, BID With Meals  cloNIDine, 0.3 mg, Oral, TID  isosorbide mononitrate, 120 mg, Oral, Daily  linaclotide, 145 mcg, Oral, QAM AC  metOLazone, 10 mg, Oral, Daily  montelukast, 10 mg, Oral, Nightly  pantoprazole, 40 mg, Oral, Daily  rOPINIRole, 0.25 mg, Oral, TID  sodium chloride, 10 mL, Intravenous, Q12H  sodium zirconium cyclosilicate, 10 g, Oral, TID  tamsulosin, 0.4 mg, Oral, Daily  Vancomycin Pharmacy Intermittent/Pulse Dosing, , Does not apply, Daily      hold, 1 each          Assessment & Plan     1. ESKD on IHDx  2. Sever sepsis likely due to tunneled dialysis cath infection  3. Anemia  4. SHPT  5.  Hyperkalemia    Cultures drawn on Friday are no growth to date.  Patient is scheduled to get tunneled dialysis catheter this  afternoon and we have planned to do dialysis today.  Mild hyperkalemia expected to improve with dialysis  Patient can be discharged home after dialysis from nephrology standpoint and he can continue his antibiotics and dialysis clinic the length of the antibiotics per ID team    Plan of care was discussed with the patient he understood verbalized agreed    Jake Lala MD  05/22/23  08:22 EDT

## 2023-05-22 NOTE — ANESTHESIA PREPROCEDURE EVALUATION
Anesthesia Evaluation     Patient summary reviewed and Nursing notes reviewed   no history of anesthetic complications:  NPO Solid Status: > 8 hours  NPO Liquid Status: > 8 hours           Airway   Mallampati: II  TM distance: >3 FB  Neck ROM: full  No difficulty expected and Large neck circumference  Dental    (+) poor dentition    Pulmonary    (+) COPD, asthma,sleep apnea, decreased breath sounds,   Cardiovascular   Exercise tolerance: poor (<4 METS)    ECG reviewed  PT is on anticoagulation therapy  Patient on routine beta blocker and Beta blocker given within 24 hours of surgery  Rhythm: regular  Rate: normal    (+) hypertension, CAD, CHF , murmur,       Neuro/Psych- negative ROS  GI/Hepatic/Renal/Endo    (+) obesity, morbid obesity,  renal disease ARF,     Musculoskeletal     Abdominal   (+) obese,    Substance History   (+) drug use (+ THC)     OB/GYN negative ob/gyn ROS         Other   arthritis, blood dyscrasia anemia,                       Anesthesia Plan    ASA 4     general and MAC     intravenous induction     Anesthetic plan, risks, benefits, and alternatives have been provided, discussed and informed consent has been obtained with: patient.  Pre-procedure education provided  Use of blood products discussed with consented to blood products.   Plan discussed with CRNA.        CODE STATUS:

## 2023-05-22 NOTE — THERAPY EVALUATION
Acute Care - Physical Therapy Initial Evaluation   [place]     Patient Name: Sudarshan Keene  : 1975  MRN: [id number]  Today's Date: 2023      Visit Dx:     ICD-10-CM ICD-9-CM   1. Sepsis, due to unspecified organism, unspecified whether acute organ dysfunction present  A41.9 038.9     995.91   2. Infection of hemodialysis catheter, initial encounter  T82.7XXA 996.62     Patient Active Problem List   Diagnosis   • ANSELMO (acute kidney injury)   • COVID-19 virus detected   • Sepsis, due to unspecified organism, unspecified whether acute organ dysfunction present   • Hemodialysis catheter infection     Past Medical History:   Diagnosis Date   • Arthritis    • Asthma    • CHF (congestive heart failure)    • COPD (chronic obstructive pulmonary disease)    • Coronary artery disease    • Hypertension    • Sleep apnea      Past Surgical History:   Procedure Laterality Date   • CARDIAC CATHETERIZATION  2008   • HERNIA REPAIR  1982     PT Assessment (last 12 hours)     PT Evaluation and Treatment     Row Name 23 1303          Physical Therapy Time and Intention    Subjective Information no complaints  -KM     Document Type evaluation  -KM     Mode of Treatment individual therapy;physical therapy  -KM     Patient Effort good  -KM     Symptoms Noted During/After Treatment none  -KM     Row Name 23 1308          General Information    Patient Profile Reviewed yes  -KM     Patient Observations alert;cooperative;agree to therapy  -KM     Prior Level of Function independent:;all household mobility;ADL's  -KM     Risks Reviewed patient:;LOB;nausea/vomiting;increased discomfort;dizziness  -KM     Benefits Reviewed patient:;improve function;increase independence;increase strength;increase balance  -KM     Row Name 23 1309          Living Environment    Current Living Arrangements home  -KM     People in Home significant other  -KM     Primary Care Provided by self  -KM     Row Name 23 130           Home Use of Assistive/Adaptive Equipment    Equipment Currently Used at Home oxygen;cpap;nebulizer  -KM     Row Name 05/22/23 1308          Cognition    Affect/Mental Status (Cognition) WFL  -KM     Orientation Status (Cognition) oriented x 4  -KM     Follows Commands (Cognition) WFL  -KM     Row Name 05/22/23 1308          Range of Motion (ROM)    Range of Motion bilateral lower extremities;ROM is L  -KM     Row Name 05/22/23 1308          Strength (Manual Muscle Testing)    Strength (Manual Muscle Testing) bilateral lower extremities;strength is L  -KM     Row Name 05/22/23 1308          Bed Mobility    Bed Mobility bed mobility (all) activities  -KM     All Activities, [place] (Bed Mobility) independent  -KM     Row Name 05/22/23 1308          Transfers    Transfers sit-stand transfer;stand-sit transfer  -KM     Row Name 05/22/23 1308          Sit-Stand Transfer    Sit-Stand [place] (Transfers) independent  -KM     Row Name 05/22/23 1308          Stand-Sit Transfer    Stand-Sit [place] (Transfers) independent  -KM     Row Name 05/22/23 1308          Gait/Stairs (Locomotion)    Gait/Stairs Locomotion gait/ambulation independence;distance ambulated  -KM     [place]ple Level (Gait) supervision  -KM     Distance in Feet (Gait) 300'  -KM     Pattern (Gait) swing-through  -KM     Row Name 05/22/23 1308          Balance    Balance Assessment sitting static balance;standing dynamic balance  -KM     Static Sitting Balance independent  -KM     Position, Sitting Balance sitting edge of bed  -KM     Dynamic Standing Balance independent  -KM     Row Name             Wound 05/22/23 1159 Right throat Incision    Wound - Properties Group Placement Date: 05/22/23  - Placement Time: 1159 -MC Side: Right  -MC Location: throat  -MC Primary Wound Type: Incision  -MC    Retired Wound - Properties Group Placement Date: 05/22/23  - Placement Time: 1159  - Side: Right  -MC Location: throat  -MC Primary  Wound Type: Incision  -MC    Retired Wound - Properties Group Date first assessed: 05/22/23  - Time first assessed: 1159  -MC Side: Right  -MC Location: throat  -MC Primary Wound Type: Incision  -MC    Row Name             Wound 05/22/23 1210 Left throat Incision    Wound - Properties Group Placement Date: 05/22/23  - Placement Time: 1210  -MC Side: Left  -MC Location: throat  -MC Primary Wound Type: Incision  -MC    Retired Wound - Properties Group Placement Date: 05/22/23  - Placement Time: 1210  -MC Side: Left  -MC Location: throat  -MC Primary Wound Type: Incision  -MC    Retired Wound - Properties Group Date first assessed: 05/22/23  - Time first assessed: 1210  -MC Side: Left  -MC Location: throat  -MC Primary Wound Type: Incision  -MC    Row Name 05/22/23 1352          Plan of Care Review    Plan of Care Reviewed With patient  -KM     Outcome Evaluation Pt. evaluation completed during PT session. He was able to perform all functional mobility skills independently. He ambulated prolonged distance w/ no AD. He tolerated session well w/ no complaints. Pt. does not qualify for skilled PT services.  -     Row Name 05/22/23 1352          Therapy Assessment/Plan (PT)    Functional Level at Time of Evaluation (PT) independent  -     Criteria for Skilled Interventions Met (PT) no;no problems identified which require skilled intervention;does not meet criteria for skilled intervention  -     Therapy Frequency (PT) evaluation only  -     Row Name 05/22/23 1352          Therapy Plan Review/Discharge Plan (PT)    Therapy Plan Review (PT) evaluation/treatment results reviewed;patient  -KM           User Key  (r) = Recorded By, (t) = Taken By, (c) = Cosigned By    Initials Name Provider Type     Althea Brown, RN Registered Nurse    Michael Parsons, PT Physical Therapist                Physical Therapy Education     Title: PT OT SLP Therapies (Done)     Topic: Physical Therapy (Done)     Point:  Mobility training (Done)     Learning Progress Summary           Patient Acceptance, E,TB, VU by  at 5/22/2023 1353                   Point: Home exercise program (Done)     Learning Progress Summary           Patient Acceptance, E,TB, VU by  at 5/22/2023 1353                   Point: Body mechanics (Done)     Learning Progress Summary           Patient Acceptance, E,TB, VU by  at 5/22/2023 1353                   Point: Precautions (Done)     Learning Progress Summary           Patient Acceptance, E,TB, VU by  at 5/22/2023 1353                               User Key     Initials Effective Dates Name Provider Type Discipline     05/24/22 -  Michael Pruitt, PT Physical Therapist PT              PT Recommendation and Plan  Anticipated Discharge Disposition (PT): home  Therapy Frequency (PT): evaluation only  Plan of Care Reviewed With: patient  Outcome Evaluation: Pt. evaluation completed during PT session. He was able to perform all functional mobility skills independently. He ambulated prolonged distance w/ no AD. He tolerated session well w/ no complaints. Pt. does not qualify for skilled PT services.       Time Calculation:    PT Charges     Row Name 05/22/23 1349             Time Calculation    PT Received On 05/22/23  -            User Key  (r) = Recorded By, (t) = Taken By, (c) = Cosigned By    Initials Name Provider Type     Michael Pruitt, PT Physical Therapist              Therapy Charges for Today     Code Description Service Date Service Provider Modifiers Qty    [id number] HC PT EVAL LOW COMPLEXITY 4 5/22/2023 Michael Pruitt, PT GP 1          PT G-Codes  AM-PAC 6 Clicks Score (PT): 24    Michael Pruitt PT  5/22/2023

## 2023-05-22 NOTE — PLAN OF CARE
Goal Outcome Evaluation:  Plan of Care Reviewed With: patient      Pt being transferred to [facility] for HD cath placement. Stable condition, VSS, no c/o CP or acute distress ntd. Will continue to follow plan of care.  Progress: improving

## 2023-05-22 NOTE — NURSING NOTE
Attempted to call report to [facility], nurse was busy said they would call back,

## 2023-05-22 NOTE — PLAN OF CARE
Goal Outcome Evaluation:     Problem: Adult Inpatient Plan of Care  Goal: Absence of Hospital-Acquired Illness or Injury  Intervention: Identify and Manage Fall Risk  Recent Flowsheet Documentation  Taken 5/21/2023 2100 by Shira Figueroa RN  Safety Promotion/Fall Prevention:   activity supervised   assistive device/personal items within reach   clutter free environment maintained   fall prevention program maintained   nonskid shoes/slippers when out of bed   room organization consistent   safety round/check completed

## 2023-05-22 NOTE — PROGRESS NOTES
Chief complaint: Renal failure    No acute events overnight    No acute distress, alert and oriented x3  Clear to auscultation bilaterally  Abdomen soft, nontender, nondistended    48-year-old male with end-stage renal failure on hemodialysis with recent infected tunneled catheter that was removed.  Patient requires establishment of long-term hemodialysis access.  -OR today for tunneled hemodialysis catheter placement, last anticoagulation dose was yesterday morning.

## 2023-05-22 NOTE — ANESTHESIA PROCEDURE NOTES
Airway  Urgency: elective    Date/Time: 5/22/2023 11:49 AM  Airway not difficult    General Information and Staff    Patient location during procedure: OR  CRNA/CAA: Sandy Bañuelos CRNA    Indications and Patient Condition  Indications for airway management: airway protection    Preoxygenated: yes      Final Airway Details  Final airway type: supraglottic airway      Successful airway: unique  Size 4     Number of attempts at approach: 1  Assessment: lips, teeth, and gum same as pre-op and atraumatic intubation

## 2023-05-22 NOTE — ANESTHESIA POSTPROCEDURE EVALUATION
Patient: Sudarshan Keene    Procedure Summary     Date: 05/22/23 Room / Location: [facility] OR  /  [facility] OR    Anesthesia Start: 1141 Anesthesia Stop: 1227    Procedure: HEMODIALYSIS CATHETER INSERTION aborted Diagnosis:       Infection of hemodialysis catheter, initial encounter      (Infection of hemodialysis catheter, initial encounter [T82.7XXA])    Surgeons: Mario Garcia MD Provider: Gelacio Azar MD    Anesthesia Type: general, MAC ASA Status: 4          Anesthesia Type: general, MAC    Vitals  Vitals Value Taken Time   /82 05/22/23 1305   Temp 97.2 °F (36.2 °C) 05/22/23 1305   Pulse 83 05/22/23 1305   Resp 16 05/22/23 1305   SpO2 99 % 05/22/23 1305           Post Anesthesia Care and Evaluation    Patient location during evaluation: PACU  Patient participation: complete - patient participated  Level of consciousness: awake  Pain score: 1  Pain management: adequate    Airway patency: patent  Anesthetic complications: No anesthetic complications  PONV Status: none  Cardiovascular status: acceptable  Respiratory status: acceptable  Hydration status: acceptable

## 2023-05-22 NOTE — OP NOTE
HEMODIALYSIS CATHETER INSERTION  Procedure Note    Sudarshan Keene  5/22/2023    Pre-op Diagnosis:   Infection of hemodialysis catheter, initial encounter [T82.7XXA]    Post-op Diagnosis:     Post-Op Diagnosis Codes:     * Infection of hemodialysis catheter, initial encounter [T82.7XXA]    Procedure(s):  HEMODIALYSIS CATHETER INSERTION aborted    Surgeon(s):  Mario Garcia MD    Anesthesia: Choice    Staff:   Circulator: Althea Brown RN  Scrub Person: Roland Vivienne Ann    Findings:   Attempt to place tunneled hemodialysis catheter aborted as the right internal jugular vein could be accessed but would not thread a guidewire in a similar fashion on the left though guidewire could be threaded into the left internal jugular vein the dilator met resistance in the superior neck and further passage against that resistance was felt to be unsafe and the procedure was aborted.  Patient has a vascular surgeon and an outside facility that we will discuss the case with for possible transfer.    Operative Procedure: Patient taken operating suite placed upon operating table.  Bilateral sequential compression devices were in place and LMA anesthesia was administered.  The bilateral neck and chest were prepped and draped in usual sterile fashion.  Timeout procedure was performed preoperative antibiotics were confirmed.  Under ultrasound visualization an 18-gauge access needle inserted to the right internal jugular vein and venous blood was aspirated.  Unfortunately guidewire met resistance very proximally in the jugular vein and was unable to be passed into the vena cava.  Attempt at this site was aborted and attention was turned to the left internal jugular vein which was accessed but a guidewire was able to be threaded through with some resistance but under fluoroscopic visualization passed into appropriate position and resistance was felt to be due to the indwelling subclavian port.  Unfortunately after stab  incision was made a guidewire entry site via cylinder technique a dilator was placed and passed over the guidewire under fluoroscopic visualization but met resistance that felt too great to proceed with further passage under fluoroscopic visualization.  The dilator was removed as was the guidewire direct pressure was held until hemostatic.  Procedure was terminated and the patient was awakened from anesthesia and taken to recovery where arrangements will be made for transfer to the outside facility which the initial tunneled catheter was placed by vascular surgery.    Estimated Blood Loss: 5 mL    Specimens:   None           Drains: None    Grafts/Implants: None    Complications: None      Mario Garcia MD     Date: 5/22/2023  Time: 12:22 EDT

## 2023-05-22 NOTE — PROGRESS NOTES
PROGRESS NOTE         Patient Identification:  Name:  Sudarshan Keene  Age:  48 y.o.  Sex:  male  :  1975  MRN:  [id number]  Visit Number:  [id number]  Primary Care Provider:  Marah Bain APRN         LOS: 5 days       ----------------------------------------------------------------------------------------------------------------------  Subjective       Chief Complaints:    Vascular Access Problem        Interval History:      Patient is sitting up in bed on room air no apparent distress.  Denies any new issues or complaints. Goes for new hemodialysis catheter insertion today. Lungs are clear to auscultation bilaterally.  Abdomen is soft and nontender.  Afebrile, no diarrhea.  WBC remains normal at 6.86.  Blood cultures on 2023 are so far showing no growth.    Review of Systems:    Constitutional: no fever, chills and night sweats.  Generalized fatigue.  Eyes: no eye drainage, itching or redness.  HEENT: no mouth sores, dysphagia or nose bleed.  Respiratory: no for shortness of breath, cough or production of sputum.  Cardiovascular: no chest pain, no palpitations, no orthopnea.  Gastrointestinal: no nausea, vomiting, or diarrhea. No abdominal pain, hematemesis or rectal bleeding.   Genitourinary: no dysuria or polyuria.  Hematologic/lymphatic: no lymph node abnormalities, no easy bruising or easy bleeding.  Musculoskeletal: no muscle or joint pain.  Skin: No rash and no itching.  Neurological: no loss of consciousness, no seizure, no headache.  Behavioral/Psych: no depression or suicidal ideation.   Endocrine: no hot flashes.  Immunologic: negative.     ----------------------------------------------------------------------------------------------------------------------      Objective       [facility] Meds:  [MAR Hold] ALPRAZolam, 2 mg, Oral, Nightly  amLODIPine, 10 mg, Oral, Daily  [MAR Hold] apixaban, 5 mg, Oral, BID  [MAR Hold] atorvastatin, 40 mg, Oral, Nightly  [MAR  Hold] budesonide-formoterol, 2 puff, Inhalation, BID - RT  [MAR Hold] bumetanide, 2 mg, Oral, Daily  [MAR Hold] buPROPion XL, 300 mg, Oral, QAM  carvedilol, 25 mg, Oral, BID With Meals  cloNIDine, 0.3 mg, Oral, TID  [MAR Hold] isosorbide mononitrate, 120 mg, Oral, Daily  [MAR Hold] linaclotide, 145 mcg, Oral, QAM AC  [MAR Hold] metOLazone, 10 mg, Oral, Daily  [MAR Hold] montelukast, 10 mg, Oral, Nightly  [MAR Hold] pantoprazole, 40 mg, Oral, Daily  [MAR Hold] rOPINIRole, 0.25 mg, Oral, TID  [MAR Hold] sodium chloride, 10 mL, Intravenous, Q12H  sodium chloride, 10 mL, Intravenous, Q12H  [MAR Hold] sodium zirconium cyclosilicate, 10 g, Oral, TID  [MAR Hold] tamsulosin, 0.4 mg, Oral, Daily  Vancomycin Pharmacy Intermittent/Pulse Dosing, , Does not apply, Daily      hold, 1 each      ----------------------------------------------------------------------------------------------------------------------    Vital Signs:  Temp:  [97.8 °F (36.6 °C)-98.5 °F (36.9 °C)] 98.5 °F (36.9 °C)  Heart Rate:  [80-96] 88  Resp:  [16-18] 18  BP: (104-190)/() 128/95  Mean Arterial Pressure (Non-Invasive) for the past 24 hrs (Last 3 readings):   Noninvasive MAP (mmHg)   05/22/23 0951 115   05/22/23 0620 127   05/21/23 2322 90     SpO2 Percentage    05/22/23 0631 05/22/23 0951 05/22/23 1121   SpO2: 97% 94% 95%     SpO2:  [94 %-97 %] 95 %  on   ;   Device (Oxygen Therapy): room air    Body mass index is 37.51 kg/m².  Wt Readings from Last 3 Encounters:   05/22/23 115 kg (254 lb)   12/21/21 (!) 148 kg (326 lb)   08/04/20 (!) 166 kg (366 lb 11.2 oz)        Intake/Output Summary (Last 24 hours) at 5/22/2023 1232  Last data filed at 5/22/2023 1214  Gross per 24 hour   Intake 460 ml   Output --   Net 460 ml     NPO Diet NPO Type: Strict NPO  ----------------------------------------------------------------------------------------------------------------------      Physical Exam:    Constitutional: Well-developed and well-nourished black male  is sitting up in bed on room air in no apparent distress.    HENT:  Head: Normocephalic and atraumatic.  Mouth:  Moist mucous membranes.    Eyes:  Conjunctivae and EOM are normal.  No scleral icterus.  Neck:  Neck supple.  No JVD present.    Cardiovascular:  Normal rate, regular rhythm and normal heart sounds with no murmur. No edema.  Pulmonary/Chest:  No respiratory distress, no wheezes, no crackles, with normal breath sounds and good air movement.   Abdominal:  Soft.  Bowel sounds are normal.  No distension and no tenderness.   Musculoskeletal:  No edema, no tenderness, and no deformity.  No swelling or redness of joints.  Neurological:  Alert and oriented to person, place, and time.  No facial droop.  No slurred speech.   Skin:  Skin is warm and dry.  No rash noted.  No pallor.  Left AV fistula, not mature at this time.  Psychiatric:   Normal mood and affect.  Behavior is normal.    ----------------------------------------------------------------------------------------------------------------------            Results from last 7 days   Lab Units 05/22/23  0443 05/20/23  0247 05/19/23  0126 05/17/23  0316 05/17/23  0053   CRP mg/dL  --   --   --   --  11.81*   LACTATE mmol/L  --   --   --   --  0.7   WBC 10*3/mm3 6.86 7.52 8.86   < > 14.09*   HEMOGLOBIN g/dL 11.5* 11.1* 11.9*   < > 12.2*   HEMATOCRIT % 35.0* 34.4* 36.3*   < > 38.6   MCV fL 96.7 96.9 98.4*   < > 99.7*   MCHC g/dL 32.9 32.3 32.8   < > 31.6   PLATELETS 10*3/mm3 139* 120* 105*   < > 150   INR   --   --   --   --  1.17*    < > = values in this interval not displayed.     Results from last 7 days   Lab Units 05/22/23  0443 05/21/23  0540 05/20/23  0247 05/19/23  0126 05/18/23  1721 05/18/23  0112 05/17/23  0316   SODIUM mmol/L 134* 134* 135*   < > 128*   < > 133*   POTASSIUM mmol/L 5.5* 5.2 4.7   < > 4.1   < > 5.2   MAGNESIUM mg/dL  --   --   --   --  2.0  --   --    CHLORIDE mmol/L 91* 90* 90*   < > 85*   < > 98   CO2 mmol/L 20.5* 23.7 25.6   < >  29.3*   < > 19.5*   BUN mg/dL 96* 86* 65*   < > 32*   < > 59*   CREATININE mg/dL 15.14* 13.56* 11.44*   < > 7.30*   < > 10.19*   CALCIUM mg/dL 9.2 9.1 8.8   < > 9.2   < > 9.2   GLUCOSE mg/dL 82 109* 102*   < > 116*   < > 70   ALBUMIN g/dL 3.4* 3.4*  --   --   --   --  3.7   BILIRUBIN mg/dL 0.3 0.3  --   --   --   --  0.4   ALK PHOS U/L 53 51  --   --   --   --  55   AST (SGOT) U/L 12 10  --   --   --   --  13   ALT (SGPT) U/L 11 8  --   --   --   --  7    < > = values in this interval not displayed.   Estimated Creatinine Clearance: 7.5 mL/min (A) (by C-G formula based on SCr of 15.14 mg/dL (H)).  No results found for: AMMONIA    No results found for: HGBA1C, POCGLU  Lab Results   Component Value Date    HGBA1C 5.60 08/01/2020     Lab Results   Component Value Date    TSH 1.670 08/01/2020    FREET4 1.05 12/21/2015       Blood Culture   Date Value Ref Range Status   05/17/2023 Abnormal Stain (C)  Preliminary   05/17/2023 Abnormal Stain (C)  Preliminary   05/17/2023 Abnormal Stain (C)  Preliminary     No results found for: URINECX  No results found for: WOUNDCX  No results found for: STOOLCX  No results found for: RESPCX  Pain Management Panel           Latest Ref Rng & Units 12/21/2021 12/19/2021   Pain Management Panel   Creatinine, Urine mg/dL 48.3   64.1                    ----------------------------------------------------------------------------------------------------------------------  Imaging Results (Last 24 Hours)       Procedure Component Value Units Date/Time    FL Surgery Fluoro [[id number]] Resulted: 05/22/23 1046     Updated: 05/22/23 1228            ----------------------------------------------------------------------------------------------------------------------    Pertinent Infectious Disease Results    COVID-19 and influenza PCR negative.  Chest x-ray from 5/17/2023 reports mildly enlarged heart size, no lobar consolidation or edema, no pleural effusion or pneumothorax, left neck central  vascular catheter with tip in the SVC/RA junction, left anterior chest port with catheter tip into the SVC. 3 out of 3 blood cultures on 5/17/2023 finalized as MRSA.   NELIDA on 5/19/2023 made no mention of vegetations or abscesses.    Assessment/Plan       Assessment     MRSA bacteremia  Tunneled dialysis catheter infection status post removal on 5/18/2023    Plan      I have seen and examined the patient myself this morning and discussed the plan of care with Shayna Brand PA-C and discussed overnight changes with primary RN here are my findings:    The patient is currently sitting up in bed without any signs of distress while breathing room air. They deny experiencing any new issues or complaints. Today, the patient is scheduled for a new hemodialysis catheter insertion.    Upon auscultation, the lungs are found to be clear bilaterally. The abdomen is soft and non-tender. The patient is afebrile and does not report any episodes of diarrhea. The white blood cell (WBC) count remains within the normal range, measuring at 6.86.    The blood cultures conducted on May 19, 2023, have shown no growth thus far. Considering the relatively quick clearance of the blood cultures, it is recommended to continue the administration of vancomycin, as prescribed by the pharmacy, until June 1, 2023, completing a two-week course of intravenous antibiotic therapy from the date of clearance. It should be noted that administering IV antibiotic therapy along with dialysis is a viable option. Repeat blood cultures conducted thus far are also showing no growth.    ANTIMICROBIAL THERAPY    Vancomycin Pharmacy Intermittent/Pulse Dosing     Code Status:   Code Status and Medical Interventions:   Ordered at: 05/17/23 0152     Code Status (Patient has no pulse and is not breathing):    CPR (Attempt to Resuscitate)     Medical Interventions (Patient has pulse or is breathing):    Full Support       Shayna Brand PA-C  05/22/23  12:32 EDT      Electronically signed by Shayna Brand PA-C, 05/22/23, 12:35 PM EDT.    Electronically signed by Thania Hernández MD, 05/22/23, 12:47 PM EDT.

## 2023-05-22 NOTE — PROGRESS NOTES
"    Psychiatric HOSPITALIST PROGRESS NOTE    S: Patient seen and examined.  Still feels well.  No acute events overnight.  No acute complaints this AM    O: BP (!) 180/104 (BP Location: Right arm, Patient Position: Lying) Comment: advised nurse Mehnaz  Pulse 91   Temp 97.8 °F (36.6 °C) (Oral)   Resp 16   Ht 175.3 cm (69.02\")   Wt 115 kg (254 lb 9.6 oz)   SpO2 97%   BMI 37.58 kg/m²     GENERAL:  age appropriate, NAD  EARS,NOSE, MOUTH, THROAT:  MMM, hearing intact  EYES: PERRL, EOMI  CV:  NL S1/S2  RESPIRATORY:  CTAB no w/c/r  GI:  S/NT/ND +BS  SKIN: No rash or lesions. No nodules. Warm abd dry  INT: No edema. No joint deformity.  PSYCH:  Normal affect, A+O x 3  NEURO: Alert and oriented, grossly nonfocal.      Scheduled Meds:ALPRAZolam, 2 mg, Oral, Nightly  amLODIPine, 10 mg, Oral, Daily  apixaban, 5 mg, Oral, BID  atorvastatin, 40 mg, Oral, Nightly  budesonide-formoterol, 2 puff, Inhalation, BID - RT  bumetanide, 2 mg, Oral, Daily  buPROPion XL, 300 mg, Oral, QAM  carvedilol, 25 mg, Oral, BID With Meals  cloNIDine, 0.3 mg, Oral, TID  isosorbide mononitrate, 120 mg, Oral, Daily  linaclotide, 145 mcg, Oral, QAM AC  metOLazone, 10 mg, Oral, Daily  montelukast, 10 mg, Oral, Nightly  pantoprazole, 40 mg, Oral, Daily  rOPINIRole, 0.25 mg, Oral, TID  sodium chloride, 10 mL, Intravenous, Q12H  sodium zirconium cyclosilicate, 10 g, Oral, TID  tamsulosin, 0.4 mg, Oral, Daily  Vancomycin Pharmacy Intermittent/Pulse Dosing, , Does not apply, Daily      Continuous Infusions:hold, 1 each      PRN Meds:.•  acetaminophen  •  hold  •  nitroglycerin  •  ondansetron  •  sodium chloride  •  sodium chloride  •  traMADol    Labs: Laboratory information reviewed and reconciled.    Results from last 7 days   Lab Units 05/22/23  0443   WBC 10*3/mm3 6.86         Results from last 7 days   Lab Units 05/22/23  0443   CO2 mmol/L 20.5*   BUN mg/dL 96*   CREATININE mg/dL 15.14*   GLUCOSE mg/dL 82         Results from last 7 days "   Lab Units 05/17/23  0053   INR  1.17*         Imaging (last 24 hours):    No radiology results for the last day   Assessment/Plan:  # Sepsis 2/2 cellulitis/MRSA bacteremia  - ID following, appreciate recs  - continue to follow blood cultures  - supportive care  - serial labs  - port removed 05/18, plan to have new line placed 05/22  - NELIDA done 05/19, no sign of infection  - continue vanc  - repeat cultures 05/19 NGTD    # ESRD  - nephro following, appreciate recs  - place temp dialysis cath after 72 hour vacation with abx  - planned for today     # HTN  - continue home regimen  - monitor and titrate as needed     # COPD without exacerbation  - continue home regimen     # Chronic CHFpEf  - monitor volume status  - continue home regimen     # CAD  - conitnue home regimen     # PAF  - resume eliquis post procedure        DVT Prophylaxis:eliquis  Disposition: continue to monitor  Discharge disposition: TBD  Prognosis: guarded    Geovanny Mosquera Jr, MD  Rounding Hospitalist  05/22/23  08:20 EDT

## 2023-05-22 NOTE — CASE MANAGEMENT/SOCIAL WORK
Continued Stay Note   Marquise     Patient Name: Sudarshan Keene  MRN: [id number]  Today's Date: 5/22/2023    Admit Date: 5/16/2023    Plan: Plans to return home when med stable.  TTS @ [facility]. Family will provide transport home at [place].   Discharge Plan     Row Name 05/22/23 0917       Plan    Plan Plans to return home when med stable.  TTS @ [facility]. Family will provide transport home at [place].    Row Name 05/22/23 0911       Plan    Plan Comments                Discharge Codes    No documentation.               Expected Discharge Date and Time     Expected Discharge Date Expected Discharge Time    May 23, 2023             Elzbieta Rooney RN

## 2023-05-22 NOTE — ANESTHESIA POSTPROCEDURE EVALUATION
Patient: Sudarshan Keene    Procedure Summary     Date: 05/22/23 Room / Location: [facility] OR  /  [facility] OR    Anesthesia Start: 1141 Anesthesia Stop: 1227    Procedure: HEMODIALYSIS CATHETER INSERTION aborted Diagnosis:       Infection of hemodialysis catheter, initial encounter      (Infection of hemodialysis catheter, initial encounter [T82.7XXA])    Surgeons: Mario Garcia MD Provider: Gelacio Azar MD    Anesthesia Type: general, MAC ASA Status: 4          Anesthesia Type: general, MAC    Vitals  Vitals Value Taken Time   /82 05/22/23 1305   Temp 97.2 °F (36.2 °C) 05/22/23 1305   Pulse 83 05/22/23 1305   Resp 16 05/22/23 1305   SpO2 99 % 05/22/23 1305           Post Anesthesia Care and Evaluation    Patient location during evaluation: PACU  Patient participation: complete - patient participated  Level of consciousness: awake  Pain score: 1  Pain management: adequate    Airway patency: patent  Anesthetic complications: No anesthetic complications  PONV Status: none  Cardiovascular status: acceptable  Respiratory status: acceptable  Hydration status: acceptable

## 2023-05-22 NOTE — DISCHARGE SUMMARY
[facility] DISCHARGE SUMMARY      Date of Admission: 5/16/2023    Date of Discharge: 5/22/2023     PCP: Marah Bain APRN    Admission Diagnosis:   Please see admission H&P    Discharge Diagnosis:   Sepsis  MRSA bacteremia  Tunneled HD catheter infection  Mild hyperkalemia   ESRD on HD  Chronic diastolic CHF  Essential HTN  PAF  CAD  COPD, stable without an acute exacerbation     Procedures Performed:  5/18/23: Removal of left tunneled HD catheter   5/19/23: NELIDA with no evidence of vegetations   5/22/23: Attempted/aborted tunneled HD catheter placement      Consults:   Consults     Date and Time Order Name Status Description    5/21/2023 11:51 AM Inpatient General Surgery Consult      5/18/2023  2:07 PM Inpatient Cardiology Consult      5/17/2023  8:50 AM Inpatient Infectious Diseases Consult Completed     5/17/2023  3:00 AM Inpatient Nephrology Consult              History of Present Illness:  Sudarshan Keene is a 48 y.o. male who presented to [facility] ED with concern for an infected dialysis catheter. Please see admission H&P for complete details.     In the ED, workup revealed sepsis with concern for cellulitis around HD catheter. Cultures were obtained and IV antibiotics initiated.      Hospital Course  Sudarshan Keene was admitted to the telemetry floor for further evaluation and treatment. He was continued on IV antibiotics including Vanc and ceftazidime. Nephrology was consulted for management of his ESRD on HD.     Initial blood cultures revealed growth of MRSA. ID was consulted with recs to continue outlined antibiotics, remove HD catheter and give patient 72-hour holiday prior to placing another HD catheter. Nephrology recommended to proceed with HD on 7/17 and 7/18 with removal of the tunneled HD catheter on 5/18. His home Eliquis was held preoperatively. Follow up labs revealed mild hyperkalemia and he was placed on lokelma. Repeat blood cultures were obtained on 5/19 with NGTD.  Cardiology was consulted and a NELIDA was performed on 5/19 with no evidence of vegetations. In the setting of negative NELIDA and quick clearing of the bacteremia, ID recommended a course of Vanc through 6/1/23.     General surgery was consulted and took patient to the OR on 5/22 for placement of another tunneled HD catheter. However, this was aborted as surgery was able to access both the right and left IJ's but could not thread a guidewire with concern for resistance. Surgery recommended transfer for vascular surgery eval. Dr. Bennett at [facility] had previously seen the patient and he was contacted for further input. He agreed to see the patient in consultation with admission to the hospitalist service. Dr. Wasserman with [facility] medicine graciously agreed to accept the patient in transfer and arrangements were made for discharge for further evaluation and treatment. Treatment plan was discussed with Mr. Donaldsonr who was in agreement with transfer.     Condition on Discharge:  Stable    Vital Signs  Vitals:    05/22/23 1332   BP:    Pulse:    Resp:    Temp: 97.6 °F (36.4 °C)   SpO2:        Physical Exam:  General:    Awake, alert, in no acute distress   Heart:      Normal S1 and S2. Regular rate and rhythm. No significant murmur, rubs or gallops appreciated. (+) port left chest    Lungs:     Respirations regular, even and unlabored. Lungs clear to auscultation B/L. No wheezes, rales or rhonchi.   Abdomen:   Soft and nontender. No guarding, rebound tenderness or  organomegaly noted. Bowel sounds present x 4.   Extremities:  No clubbing, cyanosis or edema noted. Moves UE and LE equally B/L.     Discharge Disposition:   [facility]       Discharge Medications:     Discharge Medications      New Medications      Instructions Start Date   acetaminophen 325 MG tablet  Commonly known as: TYLENOL   650 mg, Oral, Every 6 Hours PRN      sodium zirconium cyclosilicate 10 g pack  Commonly known as:  LOKELMA   10 g, Oral, 3 Times Daily      traMADol 50 MG tablet  Commonly known as: ULTRAM   50 mg, Oral, Every 12 Hours PRN      VANCOMYCIN PHARMACY INTERMITTENT/PULSE DOSING   Does not apply, Daily         Continue These Medications      Instructions Start Date   albuterol sulfate  (90 Base) MCG/ACT inhaler  Commonly known as: PROVENTIL HFA;VENTOLIN HFA;PROAIR HFA   2 puffs, Inhalation, Every 4 Hours PRN      ALPRAZolam 1 MG tablet  Commonly known as: XANAX   2 mg, Oral, Nightly      amLODIPine 10 MG tablet  Commonly known as: NORVASC   10 mg, Oral, Daily      apixaban 5 MG tablet tablet  Commonly known as: ELIQUIS   5 mg, Oral, 2 Times Daily      atorvastatin 40 MG tablet  Commonly known as: LIPITOR   40 mg, Oral, Nightly      budesonide-formoterol 160-4.5 MCG/ACT inhaler  Commonly known as: SYMBICORT   2 puffs, Inhalation, 2 Times Daily - RT      bumetanide 2 MG tablet  Commonly known as: BUMEX   2 mg, Oral, Daily      buPROPion  MG 24 hr tablet  Commonly known as: WELLBUTRIN XL   300 mg, Oral, Every Morning      carvedilol 25 MG tablet  Commonly known as: COREG   25 mg, Oral, 2 Times Daily With Meals      cloNIDine 0.3 MG tablet  Commonly known as: CATAPRES   0.3 mg, Oral, 3 Times Daily      ezetimibe 10 MG tablet  Commonly known as: ZETIA   10 mg, Oral, Daily      isosorbide mononitrate 120 MG 24 hr tablet  Commonly known as: IMDUR   120 mg, Oral, Daily      linaclotide 145 MCG capsule capsule  Commonly known as: LINZESS   145 mcg, Oral, Every Morning Before Breakfast      metOLazone 10 MG tablet  Commonly known as: ZAROXOLYN   10 mg, Oral, Daily      montelukast 10 MG tablet  Commonly known as: SINGULAIR   10 mg, Oral, Nightly      ondansetron 8 MG tablet  Commonly known as: ZOFRAN   8 mg, Oral, Every 8 Hours PRN      pantoprazole 40 MG EC tablet  Commonly known as: PROTONIX   40 mg, Oral, Daily      rOPINIRole 0.25 MG tablet  Commonly known as: REQUIP   0.25 mg, Oral, 3 Times Daily       tamsulosin 0.4 MG capsule 24 hr capsule  Commonly known as: FLOMAX   1 capsule, Oral, Daily               Discharge Diet:   Dietary Orders (From admission, onward)     Start     Ordered    05/22/23 0001  NPO Diet NPO Type: Strict NPO  Diet Effective Midnight        Question:  NPO Type  Answer:  Strict NPO    05/21/23 1320    05/20/23 1200  DIET MESSAGE Double Portions with every meal  Daily With Breakfast, Lunch & Dinner      Comments: Double Portions with every meal    05/20/23 0859                Activity at Discharge:  activity as tolerated    Follow-up Appointments:   Follow-up Information     Marah Bain APRN .    Specialty: Family Medicine  Contact information:  [address] [place] [address]  [address]  [facility] [address] [phone]             Provider, No Known .    Contact information:  [facility] [address] [phone]                       Your Scheduled Appointments    Anselmo 15, 2023  3:00 PM  New Patient with Damon Suazo MD  [facility] ([place]) [address] [place] [id number] [phone]   -Bring photo ID, insurance card, and list of medications to appointment  -If testing was completed outside of [facility] then patient must bring images on a disc  -Copay will be collected at time of appointment  -New patients should arrive 15 minutes prior to appointment              Test Results Pending at Discharge:  Pending Labs     Order Current Status    Blood Culture - Blood, Arm, Right Preliminary result    Blood Culture - Blood, Hand, Right Preliminary result           The ASCVD Risk score (Akosua GARVIN, et al., 2019) failed to calculate for the following reasons:    The patient has a prior MI or stroke diagnosis      Hardeep Tracy DO  05/22/23  13:42 EDT      Time: Greater than 30 minutes spent on this discharge.

## 2023-05-23 NOTE — PAYOR COMM NOTE
"CONTACT:   Nicole Jules RN  Phone: [phone]  Fax: [phone]    DISCHARGE NOTIFICATION- STILL PENDING APPROVAL    DISCHARGE TO: Another facility  REF # [id number]    [place] DATE: 5/22/23    IF YOU NEED ANYTHING FURTHER PLEASE LET ME KNOW.   THANKS     Sudarshan Keene \"[place]\" (48 y.o. Male)     Date of Birth   1975    Social Security Number       Address   [address] [place] [address]    Home Phone   [phone]    MRN   [id number]       [employer]   [facility]    Marital Status   Single                            Admission Date   5/16/23    Admission Type   Emergency    Admitting Provider   Natali Auguste DO    Attending Provider       Department, Room/Bed   [address], [facility]/       Discharge Date   5/22/2023    Discharge Disposition   Transfer to Another Facility    Discharge Destination                               Attending Provider: (none)   Allergies: No Known Allergies    Isolation: None   Infection: MRSA (05/17/23)   Code Status: Prior    Ht: 175.3 cm (69\")   Wt: 115 kg (254 lb)    Admission Cmt: None   Principal Problem: Sepsis, due to unspecified organism, unspecified whether acute organ dysfunction present [A41.9]                 Active Insurance as of 5/16/2023     Primary Coverage     Payor Plan Insurance Group Employer/Plan Group    ANTHEM MEDICARE REPLACEMENT ANTHEM MEDICARE ADVANTAGE [id number]     Payor Plan Address Payor Plan Phone Number Payor Plan Fax Number Effective Dates    [address] [phone]  1/1/2020 - None Entered    [facility] [id number]       Subscriber Name Subscriber Birth Date Member ID       SUDARSHAN KEENE 1975 [id number]           Secondary Coverage     Payor Plan Insurance Group Employer/Plan Group    [facility] MEDICAID      Payor Plan Address Payor Plan Phone Number Payor Plan Fax Number Effective Dates    [id number] [phone]  8/1/2020 - None Entered    [facility] [id number]       Subscriber " Name Subscriber Birth Date Member ID       ELISHA KEENE 1975 [id number]                 Emergency Contacts      (Rel.) Home Phone Work Phone Mobile Phone    HUAN LUI (Other) [phone] -- --    zulyholland segundo (Mother) -- -- [phone]    Kit Keene (Father) [phone] -- --               Discharge Summary      Hardeep Tracy DO at 05/22/23 [facility]              [facility] DISCHARGE SUMMARY      Date of Admission: 5/16/2023    Date of Discharge: 5/22/2023     PCP: Marah Bain APRN    Admission Diagnosis:   Please see admission H&P    Discharge Diagnosis:   Sepsis  MRSA bacteremia  Tunneled HD catheter infection  Mild hyperkalemia   ESRD on HD  Chronic diastolic CHF  Essential HTN  PAF  CAD  COPD, stable without an acute exacerbation     Procedures Performed:  5/18/23: Removal of left tunneled HD catheter   5/19/23: NELIDA with no evidence of vegetations   5/22/23: Attempted/aborted tunneled HD catheter placement      Consults:   Consults     Date and Time Order Name Status Description    5/21/2023 11:51 AM Inpatient General Surgery Consult      5/18/2023  2:07 PM Inpatient Cardiology Consult      5/17/2023  8:50 AM Inpatient Infectious Diseases Consult Completed     5/17/2023  3:00 AM Inpatient Nephrology Consult              History of Present Illness:  Elisha Keene is a 48 y.o. male who presented to [facility] ED with concern for an infected dialysis catheter. Please see admission H&P for complete details.     In the ED, workup revealed sepsis with concern for cellulitis around HD catheter. Cultures were obtained and IV antibiotics initiated.      Hospital Course  Elisha Keene was admitted to the telemetry floor for further evaluation and treatment. He was continued on IV antibiotics including Vanc and ceftazidime. Nephrology was consulted for management of his ESRD on HD.     Initial blood cultures revealed growth of MRSA. ID was consulted with recs to continue  outlined antibiotics, remove HD catheter and give patient 72-hour holiday prior to placing another HD catheter. Nephrology recommended to proceed with HD on 7/17 and 7/18 with removal of the tunneled HD catheter on 5/18. His home Eliquis was held preoperatively. Follow up labs revealed mild hyperkalemia and he was placed on lokelma. Repeat blood cultures were obtained on 5/19 with NGTD. Cardiology was consulted and a NELIDA was performed on 5/19 with no evidence of vegetations. In the setting of negative NELIDA and quick clearing of the bacteremia, ID recommended a course of Vanc through 6/1/23.     General surgery was consulted and took patient to the OR on 5/22 for placement of another tunneled HD catheter. However, this was aborted as surgery was able to access both the right and left IJ's but could not thread a guidewire with concern for resistance. Surgery recommended transfer for vascular surgery eval. Dr. Bennett at [facility] had previously seen the patient and he was contacted for further input. He agreed to see the patient in consultation with admission to the hospitalist service. Dr. Wasserman with hospital medicine graciously agreed to accept the patient in transfer and arrangements were made for discharge for further evaluation and treatment. Treatment plan was discussed with Mr. Keene who was in agreement with transfer.     Condition on Discharge:  Stable    Vital Signs  Vitals:    05/22/23 1332   BP:    Pulse:    Resp:    Temp: 97.6 °F (36.4 °C)   SpO2:        Physical Exam:  General:    Awake, alert, in no acute distress   Heart:      Normal S1 and S2. Regular rate and rhythm. No significant murmur, rubs or gallops appreciated. (+) port left chest    Lungs:     Respirations regular, even and unlabored. Lungs clear to auscultation B/L. No wheezes, rales or rhonchi.   Abdomen:   Soft and nontender. No guarding, rebound tenderness or  organomegaly noted. Bowel sounds present x 4.   Extremities:  No  clubbing, cyanosis or edema noted. Moves UE and LE equally B/L.     Discharge Disposition:   [facility]       Discharge Medications:     Discharge Medications      New Medications      Instructions Start Date   acetaminophen 325 MG tablet  Commonly known as: TYLENOL   650 mg, Oral, Every 6 Hours PRN      sodium zirconium cyclosilicate 10 g pack  Commonly known as: LOKELMA   10 g, Oral, 3 Times Daily      traMADol 50 MG tablet  Commonly known as: ULTRAM   50 mg, Oral, Every 12 Hours PRN      VANCOMYCIN PHARMACY INTERMITTENT/PULSE DOSING   Does not apply, Daily         Continue These Medications      Instructions Start Date   albuterol sulfate  (90 Base) MCG/ACT inhaler  Commonly known as: PROVENTIL HFA;VENTOLIN HFA;PROAIR HFA   2 puffs, Inhalation, Every 4 Hours PRN      ALPRAZolam 1 MG tablet  Commonly known as: XANAX   2 mg, Oral, Nightly      amLODIPine 10 MG tablet  Commonly known as: NORVASC   10 mg, Oral, Daily      apixaban 5 MG tablet tablet  Commonly known as: ELIQUIS   5 mg, Oral, 2 Times Daily      atorvastatin 40 MG tablet  Commonly known as: LIPITOR   40 mg, Oral, Nightly      budesonide-formoterol 160-4.5 MCG/ACT inhaler  Commonly known as: SYMBICORT   2 puffs, Inhalation, 2 Times Daily - RT      bumetanide 2 MG tablet  Commonly known as: BUMEX   2 mg, Oral, Daily      buPROPion  MG 24 hr tablet  Commonly known as: WELLBUTRIN XL   300 mg, Oral, Every Morning      carvedilol 25 MG tablet  Commonly known as: COREG   25 mg, Oral, 2 Times Daily With Meals      cloNIDine 0.3 MG tablet  Commonly known as: CATAPRES   0.3 mg, Oral, 3 Times Daily      ezetimibe 10 MG tablet  Commonly known as: ZETIA   10 mg, Oral, Daily      isosorbide mononitrate 120 MG 24 hr tablet  Commonly known as: IMDUR   120 mg, Oral, Daily      linaclotide 145 MCG capsule capsule  Commonly known as: LINZESS   145 mcg, Oral, Every Morning Before Breakfast      metOLazone 10 MG tablet  Commonly known  as: ZAROXOLYN   10 mg, Oral, Daily      montelukast 10 MG tablet  Commonly known as: SINGULAIR   10 mg, Oral, Nightly      ondansetron 8 MG tablet  Commonly known as: ZOFRAN   8 mg, Oral, Every 8 Hours PRN      pantoprazole 40 MG EC tablet  Commonly known as: PROTONIX   40 mg, Oral, Daily      rOPINIRole 0.25 MG tablet  Commonly known as: REQUIP   0.25 mg, Oral, 3 Times Daily      tamsulosin 0.4 MG capsule 24 hr capsule  Commonly known as: FLOMAX   1 capsule, Oral, Daily               Discharge Diet:   Dietary Orders (From admission, onward)     Start     Ordered    05/22/23 0001  NPO Diet NPO Type: Strict NPO  Diet Effective Midnight        Question:  NPO Type  Answer:  Strict NPO    05/21/23 1320    05/20/23 1200  DIET MESSAGE Double Portions with every meal  Daily With Breakfast, Lunch & Dinner      Comments: Double Portions with every meal    05/20/23 0859                Activity at Discharge:  activity as tolerated    Follow-up Appointments:   Follow-up Information     Marah Bain APRN .    Specialty: Family Medicine  Contact information:  [address] [place] [address]  [address]  [facility] [address] [phone]             Provider, No Known .    Contact information:  [facility] [address] [phone]                       Your Scheduled Appointments    Anselmo 15, 2023  3:00 PM  New Patient with Damon Suazo MD  [facility] ([place]) [facility] [address] [phone]   -Bring photo ID, insurance card, and list of medications to appointment  -If testing was completed outside of [facility] then patient must bring images on a disc  -Copay will be collected at time of appointment  -New patients should arrive 15 minutes prior to appointment              Test Results Pending at Discharge:  Pending Labs     Order Current Status    Blood Culture - Blood, Arm, Right Preliminary result    Blood Culture - Blood, Hand, Right Preliminary result            The ASCVD Risk score (Akosua GARVIN, et al., 2019) failed to calculate for the following reasons:    The patient has a prior MI or stroke diagnosis      Hardepe Tracy DO  05/22/23  13:42 EDT      Time: Greater than 30 minutes spent on this discharge.          Electronically signed by Hardeep Tracy DO at 05/22/23 1883

## 2023-05-23 NOTE — PAYOR COMM NOTE
"CONTACT:   Nicole Jules RN  Phone: [phone]  Fax: [phone]    DISCHARGE NOTIFICATION- STILL PENDING APPROVAL    DISCHARGE TO: Another facility  REF # [id number]    DC DATE: 5/22/23    IF YOU NEED ANYTHING FURTHER PLEASE LET ME KNOW.   THANKS     Sudarshan Keene \"[place]\" (48 y.o. Male)     Date of Birth   1975    Social Security Number       Address   [address] [place], [place] [address]    Home Phone   [phone]    MRN   [id number]       [facility]aptism   [facility]    Marital Status   Single                            Admission Date   5/16/23    Admission Type   Emergency    Admitting Provider   Natali Auguste DO    Attending Provider       Department, Room/Bed   [address], [address]/       Discharge Date   5/22/2023    Discharge Disposition   Transfer to Another Facility    Discharge Destination                               Attending Provider: (none)   Allergies: No Known Allergies    Isolation: None   Infection: MRSA (05/17/23)   Code Status: Prior    Ht: 175.3 cm (69\")   Wt: 115 kg (254 lb)    Admission Cmt: None   Principal Problem: Sepsis, due to unspecified organism, unspecified whether acute organ dysfunction present [A41.9]                 Active Insurance as of 5/16/2023     Primary Coverage     Payor Plan Insurance Group Employer/Plan Group    ANTHEM MEDICARE REPLACEMENT ANTHEM MEDICARE ADVANTAGE [id number]     Payor Plan Address Payor Plan Phone Number Payor Plan Fax Number Effective Dates    [address] [phone]  1/1/2020 - None Entered    [facility] [address]       Subscriber Name Subscriber Birth Date Member ID       SUDARSHAN KEENE 1975 [id number]           Secondary Coverage     Payor Plan Insurance Group Employer/Plan Group    [facility] MEDICAID      Payor Plan Address Payor Plan Phone Number Payor Plan Fax Number Effective Dates    [address] [phone]  8/1/2020 - None Entered    [facility] [id number]       " Subscriber Name Subscriber Birth Date Member ID       ELISHA KEENE 1975 [id number]                 Emergency Contacts      (Rel.) Home Phone Work Phone Mobile Phone    HUAN LUI (Other) [phone] -- --    [place] caruso (Mother) -- -- [phone]    Kit Keene (Father) [phone] -- --               Discharge Summary      Hardeep Tracy DO at 05/22/23 1342              [facility] DISCHARGE SUMMARY      Date of Admission: 5/16/2023    Date of Discharge: 5/22/2023     PCP: Marah Bain APRN    Admission Diagnosis:   Please see admission H&P    Discharge Diagnosis:   Sepsis  MRSA bacteremia  Tunneled HD catheter infection  Mild hyperkalemia   ESRD on HD  Chronic diastolic CHF  Essential HTN  PAF  CAD  COPD, stable without an acute exacerbation     Procedures Performed:  5/18/23: Removal of left tunneled HD catheter   5/19/23: NELIDA with no evidence of vegetations   5/22/23: Attempted/aborted tunneled HD catheter placement      Consults:   Consults     Date and Time Order Name Status Description    5/21/2023 11:51 AM Inpatient General Surgery Consult      5/18/2023  2:07 PM Inpatient Cardiology Consult      5/17/2023  8:50 AM Inpatient Infectious Diseases Consult Completed     5/17/2023  3:00 AM Inpatient Nephrology Consult              History of Present Illness:  Elisha Keene is a 48 y.o. male who presented to [facility] ED with concern for an infected dialysis catheter. Please see admission H&P for complete details.     In the ED, workup revealed sepsis with concern for cellulitis around HD catheter. Cultures were obtained and IV antibiotics initiated.      Hospital Course  Elisha Keene was admitted to the telemetry floor for further evaluation and treatment. He was continued on IV antibiotics including Vanc and ceftazidime. Nephrology was consulted for management of his ESRD on HD.     Initial blood cultures revealed growth of MRSA. ID was consulted with callie  to continue outlined antibiotics, remove HD catheter and give patient 72-hour holiday prior to placing another HD catheter. Nephrology recommended to proceed with HD on 7/17 and 7/18 with removal of the tunneled HD catheter on 5/18. His home Eliquis was held preoperatively. Follow up labs revealed mild hyperkalemia and he was placed on lokelma. Repeat blood cultures were obtained on 5/19 with NGTD. Cardiology was consulted and a NELIDA was performed on 5/19 with no evidence of vegetations. In the setting of negative NELIDA and quick clearing of the bacteremia, ID recommended a course of Vanc through 6/1/23.     General surgery was consulted and took patient to the OR on 5/22 for placement of another tunneled HD catheter. However, this was aborted as surgery was able to access both the right and left IJ's but could not thread a guidewire with concern for resistance. Surgery recommended transfer for vascular surgery eval. Dr. Bennett at [facility] had previously seen the patient and he was contacted for further input. He agreed to see the patient in consultation with admission to the hospitalist service. Dr. Wasserman with hospital medicine graciously agreed to accept the patient in transfer and arrangements were made for discharge for further evaluation and treatment. Treatment plan was discussed with Mr. Keene who was in agreement with transfer.     Condition on Discharge:  Stable    Vital Signs  Vitals:    05/22/23 1332   BP:    Pulse:    Resp:    Temp: 97.6 °F (36.4 °C)   SpO2:        Physical Exam:  General:    Awake, alert, in no acute distress   Heart:      Normal S1 and S2. Regular rate and rhythm. No significant murmur, rubs or gallops appreciated. (+) port left chest    Lungs:     Respirations regular, even and unlabored. Lungs clear to auscultation B/L. No wheezes, rales or rhonchi.   Abdomen:   Soft and nontender. No guarding, rebound tenderness or  organomegaly noted. Bowel sounds present x 4.    Extremities:  No clubbing, cyanosis or edema noted. Moves UE and LE equally B/L.     Discharge Disposition:   [facility]       Discharge Medications:     Discharge Medications      New Medications      Instructions Start Date   acetaminophen 325 MG tablet  Commonly known as: TYLENOL   650 mg, Oral, Every 6 Hours PRN      sodium zirconium cyclosilicate 10 g pack  Commonly known as: LOKELMA   10 g, Oral, 3 Times Daily      traMADol 50 MG tablet  Commonly known as: ULTRAM   50 mg, Oral, Every 12 Hours PRN      VANCOMYCIN PHARMACY INTERMITTENT/PULSE DOSING   Does not apply, Daily         Continue These Medications      Instructions Start Date   albuterol sulfate  (90 Base) MCG/ACT inhaler  Commonly known as: PROVENTIL HFA;VENTOLIN HFA;PROAIR HFA   2 puffs, Inhalation, Every 4 Hours PRN      ALPRAZolam 1 MG tablet  Commonly known as: XANAX   2 mg, Oral, Nightly      amLODIPine 10 MG tablet  Commonly known as: NORVASC   10 mg, Oral, Daily      apixaban 5 MG tablet tablet  Commonly known as: ELIQUIS   5 mg, Oral, 2 Times Daily      atorvastatin 40 MG tablet  Commonly known as: LIPITOR   40 mg, Oral, Nightly      budesonide-formoterol 160-4.5 MCG/ACT inhaler  Commonly known as: SYMBICORT   2 puffs, Inhalation, 2 Times Daily - RT      bumetanide 2 MG tablet  Commonly known as: BUMEX   2 mg, Oral, Daily      buPROPion  MG 24 hr tablet  Commonly known as: WELLBUTRIN XL   300 mg, Oral, Every Morning      carvedilol 25 MG tablet  Commonly known as: COREG   25 mg, Oral, 2 Times Daily With Meals      cloNIDine 0.3 MG tablet  Commonly known as: CATAPRES   0.3 mg, Oral, 3 Times Daily      ezetimibe 10 MG tablet  Commonly known as: ZETIA   10 mg, Oral, Daily      isosorbide mononitrate 120 MG 24 hr tablet  Commonly known as: IMDUR   120 mg, Oral, Daily      linaclotide 145 MCG capsule capsule  Commonly known as: LINZESS   145 mcg, Oral, Every Morning Before Breakfast      metOLazone 10 MG  tablet  Commonly known as: ZAROXOLYN   10 mg, Oral, Daily      montelukast 10 MG tablet  Commonly known as: SINGULAIR   10 mg, Oral, Nightly      ondansetron 8 MG tablet  Commonly known as: ZOFRAN   8 mg, Oral, Every 8 Hours PRN      pantoprazole 40 MG EC tablet  Commonly known as: PROTONIX   40 mg, Oral, Daily      rOPINIRole 0.25 MG tablet  Commonly known as: REQUIP   0.25 mg, Oral, 3 Times Daily      tamsulosin 0.4 MG capsule 24 hr capsule  Commonly known as: FLOMAX   1 capsule, Oral, Daily               Discharge Diet:   Dietary Orders (From admission, onward)     Start     Ordered    05/22/23 0001  NPO Diet NPO Type: Strict NPO  Diet Effective Midnight        Question:  NPO Type  Answer:  Strict NPO    05/21/23 1320    05/20/23 1200  DIET MESSAGE Double Portions with every meal  Daily With Breakfast, Lunch & Dinner      Comments: Double Portions with every meal    05/20/23 0859                Activity at Discharge:  activity as tolerated    Follow-up Appointments:   Follow-up Information     Marah Bain APRN .    Specialty: Family Medicine  Contact information:  [address] [place] [address]  [address]  [facility] [address] [phone]             Provider, No Known .    Contact information:  [facility] [address] [phone]                       Your Scheduled Appointments    Anselmo 15, 2023  3:00 PM  New Patient with Damon Suazo MD  [facility] ([place])  [address]  [facility] [address] [phone]   -Bring photo ID, insurance card, and list of medications to appointment  -If testing was completed outside of [facility] then patient must bring images on a disc  -Copay will be collected at time of appointment  -New patients should arrive 15 minutes prior to appointment              Test Results Pending at Discharge:  Pending Labs     Order Current Status    Blood Culture - Blood, Arm, Right Preliminary result    Blood Culture - Blood, Hand,  Right Preliminary result           The ASCVD Risk score ([place] GARVIN, et al., 2019) failed to calculate for the following reasons:    The patient has a prior MI or stroke diagnosis      Hardeep Tracy DO  05/22/23  13:42 EDT      Time: Greater than 30 minutes spent on this discharge.          Electronically signed by Hardeep Tracy DO at 05/22/23 0106

## 2023-05-24 LAB
BACTERIA SPEC AEROBE CULT: NORMAL
BACTERIA SPEC AEROBE CULT: NORMAL

## 2023-09-16 ENCOUNTER — APPOINTMENT (OUTPATIENT)
Dept: ULTRASOUND IMAGING | Facility: HOSPITAL | Age: 48
End: 2023-09-16
Payer: MEDICARE

## 2023-09-16 ENCOUNTER — HOSPITAL ENCOUNTER (EMERGENCY)
Facility: HOSPITAL | Age: 48
Discharge: HOME | End: 2023-09-16
Attending: EMERGENCY MEDICINE
Payer: MEDICARE

## 2023-09-16 VITALS
RESPIRATION RATE: 16 BRPM | BODY MASS INDEX: 37.33 KG/M2 | SYSTOLIC BLOOD PRESSURE: 114 MMHG | TEMPERATURE: 98 F | HEIGHT: 69 IN | OXYGEN SATURATION: 98 % | WEIGHT: 252 LBS | DIASTOLIC BLOOD PRESSURE: 68 MMHG | HEART RATE: 89 BPM

## 2023-09-16 DIAGNOSIS — L03.116 CELLULITIS OF LEFT LOWER EXTREMITY: ICD-10-CM

## 2023-09-16 DIAGNOSIS — T14.8XXA HEMATOMA: Primary | ICD-10-CM

## 2023-09-16 LAB
BASOPHILS # BLD AUTO: 0.03 10*3/MM3 (ref 0–0.2)
BASOPHILS NFR BLD AUTO: 0.8 % (ref 0–1.5)
DEPRECATED RDW RBC AUTO: 53.6 FL (ref 37–54)
EOSINOPHIL # BLD AUTO: 0.13 10*3/MM3 (ref 0–0.4)
EOSINOPHIL NFR BLD AUTO: 3.4 % (ref 0.3–6.2)
ERYTHROCYTE [DISTWIDTH] IN BLOOD BY AUTOMATED COUNT: 14.8 % (ref 12.3–15.4)
HCT VFR BLD AUTO: 39.1 % (ref 37.5–51)
HGB BLD-MCNC: 12.3 G/DL (ref 13–17.7)
HOLD SPECIMEN: NORMAL
HOLD SPECIMEN: NORMAL
IMM GRANULOCYTES # BLD AUTO: 0 10*3/MM3 (ref 0–0.05)
IMM GRANULOCYTES NFR BLD AUTO: 0 % (ref 0–0.5)
LYMPHOCYTES # BLD AUTO: 0.99 10*3/MM3 (ref 0.7–3.1)
LYMPHOCYTES NFR BLD AUTO: 25.8 % (ref 19.6–45.3)
MCH RBC QN AUTO: 31 PG (ref 26.6–33)
MCHC RBC AUTO-ENTMCNC: 31.5 G/DL (ref 31.5–35.7)
MCV RBC AUTO: 98.5 FL (ref 79–97)
MONOCYTES # BLD AUTO: 0.6 10*3/MM3 (ref 0.1–0.9)
MONOCYTES NFR BLD AUTO: 15.6 % (ref 5–12)
NEUTROPHILS NFR BLD AUTO: 2.09 10*3/MM3 (ref 1.7–7)
NEUTROPHILS NFR BLD AUTO: 54.4 % (ref 42.7–76)
NRBC BLD AUTO-RTO: 0 /100 WBC (ref 0–0.2)
PLATELET # BLD AUTO: 137 10*3/MM3 (ref 140–450)
PMV BLD AUTO: 10.6 FL (ref 6–12)
RBC # BLD AUTO: 3.97 10*6/MM3 (ref 4.14–5.8)
WBC NRBC COR # BLD: 3.84 10*3/MM3 (ref 3.4–10.8)
WHOLE BLOOD HOLD COAG: NORMAL
WHOLE BLOOD HOLD SPECIMEN: NORMAL

## 2023-09-16 PROCEDURE — 93926 LOWER EXTREMITY STUDY: CPT

## 2023-09-16 PROCEDURE — 85025 COMPLETE CBC W/AUTO DIFF WBC: CPT | Performed by: PHYSICIAN ASSISTANT

## 2023-09-16 PROCEDURE — 99284 EMERGENCY DEPT VISIT MOD MDM: CPT

## 2023-09-16 PROCEDURE — 36415 COLL VENOUS BLD VENIPUNCTURE: CPT

## 2023-09-16 PROCEDURE — 93971 EXTREMITY STUDY: CPT

## 2023-09-16 RX ORDER — CEPHALEXIN 500 MG/1
500 CAPSULE ORAL 4 TIMES DAILY
Qty: 28 CAPSULE | Refills: 0 | Status: SHIPPED | OUTPATIENT
Start: 2023-09-16

## 2023-09-16 NOTE — Clinical Note
[facility] EMERGENCY DEPARTMENT  [address] [facility] [address]  Phone: [phone]    Sudarshan Keene was seen and treated in our emergency department on 9/16/2023.  He may return to work on 09/17/2023.         Thank you for choosing [facility].    Kenneth Schroeder PA-C

## 2023-09-16 NOTE — ED PROVIDER NOTES
Subjective   History of Present Illness  48-year-old male with past medical history of arthritis, asthma, CHF, COPD, CAD, hypertension, sleep apnea, and end-stage renal disease on dialysis presents to the emergency room with left lower extremity injury.  Patient states he has a known hematoma which he sustained from a cart hitting his leg 1 week ago.  Patient states he has had an ultrasound and a CT scan both of which just showed hematoma formation however states he is noticing distal discoloration around his ankle.  Patient states the longer he stands on his feet the worst his leg hurts.  He does state that he takes a blood thinner but has not taken it in the past 2 or 3 days.  He denies any other injuries, shortness of breath, or chest pain.  Aggravating factors include bearing weight and movement.  Denies any alleviating factors.  Denies any other complaints or concerns at this time.    History provided by:  Patient   used: No      Review of Systems   Constitutional: Negative.  Negative for fever.   HENT: Negative.     Respiratory: Negative.     Cardiovascular: Negative.  Negative for chest pain.   Gastrointestinal: Negative.  Negative for abdominal pain.   Endocrine: Negative.    Genitourinary: Negative.  Negative for dysuria.   Musculoskeletal:         (+) Left leg pain   Skin: Negative.    Neurological: Negative.    Psychiatric/Behavioral: Negative.     All other systems reviewed and are negative.    Past Medical History:   Diagnosis Date    Arthritis     Asthma     CHF (congestive heart failure)     COPD (chronic obstructive pulmonary disease)     Coronary artery disease     Hypertension     Sleep apnea        No Known Allergies    Past Surgical History:   Procedure Laterality Date    CARDIAC CATHETERIZATION  2008 2006    HERNIA REPAIR  1982    INSERTION HEMODIALYSIS CATHETER N/A 5/22/2023    Procedure: HEMODIALYSIS CATHETER INSERTION aborted;  Surgeon: Mario Garcia MD;  Location:   [facility] OR;  Service: General;  Laterality: N/A;       Family History   Problem Relation Age of Onset    Hypertension Mother     Hypertension Father     Hypertension Sister     Heart disease Sister        Social History     Socioeconomic History    Marital status: Single   Tobacco Use    Smoking status: Former     Packs/day: 0.25     Types: Cigarettes     Quit date: 2014     Years since quittin.1     Passive exposure: Never    Smokeless tobacco: Never   Vaping Use    Vaping Use: Never used   Substance and Sexual Activity    Alcohol use: Never    Drug use: Yes     Frequency: 7.0 times per week     Types: Marijuana     Comment: tobacco free    Sexual activity: Yes     Partners: Female           Objective   Physical Exam  Vitals and nursing note reviewed.   Constitutional:       General: He is not in acute distress.     Appearance: He is well-developed. He is not diaphoretic.   HENT:      Head: Normocephalic and atraumatic.      Right Ear: External ear normal.      Left Ear: External ear normal.      Nose: Nose normal.   Eyes:      Conjunctiva/sclera: Conjunctivae normal.      Pupils: Pupils are equal, round, and reactive to light.   Neck:      Vascular: No JVD.      Trachea: No tracheal deviation.   Cardiovascular:      Rate and Rhythm: Normal rate and regular rhythm.      Heart sounds: Normal heart sounds. No murmur heard.  Pulmonary:      Effort: Pulmonary effort is normal. No respiratory distress.      Breath sounds: Normal breath sounds. No wheezing.   Abdominal:      General: Bowel sounds are normal.      Palpations: Abdomen is soft.      Tenderness: There is no abdominal tenderness.   Musculoskeletal:         General: No deformity. Normal range of motion.      Cervical back: Normal range of motion and neck supple.        Legs:    Skin:     General: Skin is warm and dry.      Coloration: Skin is not pale.      Findings: No erythema or rash.   Neurological:      Mental Status: He is alert and oriented to  person, place, and time.      Cranial Nerves: No cranial nerve deficit.   Psychiatric:         Behavior: Behavior normal.         Thought Content: Thought content normal.       Incision & Drainage    Date/Time: 9/16/2023 4:04 PM  Performed by: Kenneth Schroeder PA-C  Authorized by: Demetrio Beach MD     Consent:     Consent obtained:  Verbal    Consent given by:  Patient    Risks, benefits, and alternatives were discussed: yes      Risks discussed:  Bleeding, damage to other organs, incomplete drainage, infection and pain    Alternatives discussed:  Delayed treatment, no treatment, alternative treatment, observation and referral  Universal protocol:     Procedure explained and questions answered to patient or proxy's satisfaction: yes      Relevant documents present and verified: yes      Test results available : yes      Imaging studies available: yes      Required blood products, implants, devices, and special equipment available: yes      Site/side marked: yes      Immediately prior to procedure, a time out was called: yes      Patient identity confirmed:  Verbally with patient, arm band, provided demographic data and hospital-assigned identification number  Location:     Indications for incision and drainage: hematoma.    Location:  Lower extremity    Lower extremity location:  Leg    Leg location:  L lower leg  Pre-procedure details:     Skin preparation:  Chlorhexidine  Sedation:     Sedation type:  None  Anesthesia:     Anesthesia method:  None  Procedure type:     Complexity:  Simple  Procedure details:     Incision types:  Stab incision    Incision depth:  Dermal    Drainage:  Bloody    Drainage amount:  Scant    Wound treatment:  Wound left open    Packing materials:  None  Post-procedure details:     Procedure completion:  Tolerated well, no immediate complications  Comments:      Pt tolerated procedure well. No acute complications.           ED Course  ED Course as of 09/16/23 2206   Sat Sep 16,  2023   1524 Platelets(!): 137 [TK]   1524 US Arterial Doppler Lower Extremity Left [TK]   1524 US Venous Doppler Lower Extremity Left (duplex) [TK]      ED Course User Index  [TK] Kenneth Schroeder PA-C                                           Medical Decision Making  Problems Addressed:  Cellulitis of left lower extremity: complicated acute illness or injury  Hematoma: complicated acute illness or injury    Amount and/or Complexity of Data Reviewed  Labs: ordered. Decision-making details documented in ED Course.  Radiology:  Decision-making details documented in ED Course.    Risk  Prescription drug management.        Final diagnoses:   Hematoma   Cellulitis of left lower extremity       ED Disposition  ED Disposition       ED Disposition   Discharge    Condition   Stable    Comment   --               Marah Bain, APRN  [address] [facility]  [address]  [facility] [id number] [phone]    In 2 days      Brandie Joel MD  [address] [id number] [phone]    In 2 days           Medication List        New Prescriptions      cephalexin 500 MG capsule  Commonly known as: KEFLEX  Take 1 capsule by mouth 4 (Four) Times a Day.               Where to Get Your Medications        These medications were sent to [facility] - [place], [place] - [address] [facility] - [phone]  - [phone] FX  [address] [facility] [place] [address]      Phone: [phone]   cephalexin 500 MG capsule            Kenneth Schroeder PA-C  09/16/23 1806

## 2024-06-24 NOTE — PLAN OF CARE
"Pt frustrated and agitated, stating he's \"going home tomorrow if he has to walk there\". Frustrated r/t not being given xanax that pt states he takes at home. PRN atarax administered to pt along w/ tylenol. Pt on Bipap and resting comfortably.   " Universal Safety Interventions

## (undated) DEVICE — KT CATH HEMO CANNON2 PLS LNG TRM 15F 11IN

## (undated) DEVICE — PK BASIC 70

## (undated) DEVICE — CVR PROB ULTRASND CIVFLEX GEN/PURP TELESCP/FOLD 5.5X58IN LF

## (undated) DEVICE — SUT NLY 2/0 664G

## (undated) DEVICE — PICC LINE DRESSING CHANGE TRAY: Brand: MEDLINE

## (undated) DEVICE — PATIENT RETURN ELECTRODE, SINGLE-USE, CONTACT QUALITY MONITORING, ADULT, WITH 9FT CORD, FOR PATIENTS WEIGING OVER 33LBS. (15KG): Brand: MEGADYNE

## (undated) DEVICE — ST ACC MICROPUNCTURE .018 ECHO/TRANSLSS/PLDM/TP 4F/10CM 21G

## (undated) DEVICE — UNDERGLV SURG BIOGEL INDICAT PF 8 GRN

## (undated) DEVICE — DRP C/ARM W/BAND W/CLIPS 41X74IN

## (undated) DEVICE — TBG PENCL TELESCP MEGADYNE SMOKE EVAC 10FT

## (undated) DEVICE — GLV SURG SENSICARE W/ALOE PF LF 7.5 STRL

## (undated) DEVICE — ADHS SKIN PREMIERPRO EXOFIN TOPICAL HI/VISC .5ML

## (undated) DEVICE — SUT MNCRYL 4/0 PS2 18 IN

## (undated) DEVICE — SYR LUERLOK 30CC

## (undated) DEVICE — DRAPE,UTILTY,TAPE,15X26, 4EA/PK: Brand: MEDLINE

## (undated) DEVICE — DRAPE,T,LAPARO,TRANS,STERILE: Brand: MEDLINE

## (undated) DEVICE — HOLDER: Brand: DEROYAL